# Patient Record
Sex: FEMALE | Race: WHITE | NOT HISPANIC OR LATINO | Employment: OTHER | URBAN - METROPOLITAN AREA
[De-identification: names, ages, dates, MRNs, and addresses within clinical notes are randomized per-mention and may not be internally consistent; named-entity substitution may affect disease eponyms.]

---

## 2017-01-12 ENCOUNTER — PATIENT MESSAGE (OUTPATIENT)
Dept: ADMINISTRATIVE | Facility: OTHER | Age: 62
End: 2017-01-12

## 2017-12-23 DIAGNOSIS — I10 ESSENTIAL HYPERTENSION: ICD-10-CM

## 2017-12-23 DIAGNOSIS — E03.9 ACQUIRED HYPOTHYROIDISM: ICD-10-CM

## 2017-12-27 RX ORDER — METOPROLOL SUCCINATE 25 MG/1
TABLET, EXTENDED RELEASE ORAL
Qty: 90 TABLET | Refills: 0 | Status: SHIPPED | OUTPATIENT
Start: 2017-12-27 | End: 2018-03-17 | Stop reason: SDUPTHER

## 2017-12-27 RX ORDER — LISINOPRIL AND HYDROCHLOROTHIAZIDE 12.5; 2 MG/1; MG/1
TABLET ORAL
Qty: 180 TABLET | Refills: 0 | Status: SHIPPED | OUTPATIENT
Start: 2017-12-27 | End: 2018-03-17 | Stop reason: SDUPTHER

## 2017-12-27 RX ORDER — LEVOTHYROXINE SODIUM 50 UG/1
TABLET ORAL
Qty: 90 TABLET | Refills: 0 | Status: SHIPPED | OUTPATIENT
Start: 2017-12-27 | End: 2018-03-17 | Stop reason: SDUPTHER

## 2017-12-27 NOTE — PROGRESS NOTES
Refill Authorization Note     is requesting a refill authorization.    Brief assessment and rationale for refill: APPROVE: pt needs labs and f/u  Amount/Quantity of medication ordered: 90d         Refills Authorized: Yes  If authorized number of refills: 0        Medication-related problems identified: Requires labs  Medication Therapy Plan: Will order CMP and TSH; NTBS and also f/u.  3 more mo  Name and strength of medication: levothyroxine / lisinopril-hctz / metoprolol succinate  How patient will take medication: t1t po daily / t2t po daily (lisino)  Medication reconciliation completed: No  Comments:   Lab Results   Component Value Date    CREATININE 0.8 11/22/2016    BUN 16 11/22/2016     11/22/2016    K 4.5 11/22/2016     11/22/2016    CO2 30 (H) 11/22/2016      BP Readings from Last 3 Encounters:   11/21/16 126/86   10/02/15 110/80   07/09/14 130/84

## 2017-12-30 ENCOUNTER — HOSPITAL ENCOUNTER (OUTPATIENT)
Dept: RADIOLOGY | Facility: HOSPITAL | Age: 62
Discharge: HOME | End: 2017-12-30
Attending: NURSE PRACTITIONER
Payer: COMMERCIAL

## 2017-12-30 VITALS — BODY MASS INDEX: 28.49 KG/M2 | WEIGHT: 166 LBS

## 2017-12-30 DIAGNOSIS — Z12.39 ENCOUNTER FOR SPECIAL SCREENING EXAMINATION FOR NEOPLASM OF BREAST: ICD-10-CM

## 2017-12-30 PROCEDURE — 77063 BREAST TOMOSYNTHESIS BI: CPT | Mod: 26,,, | Performed by: RADIOLOGY

## 2017-12-30 PROCEDURE — 77067 SCR MAMMO BI INCL CAD: CPT | Mod: 26,,, | Performed by: RADIOLOGY

## 2017-12-30 PROCEDURE — 77067 SCR MAMMO BI INCL CAD: CPT | Mod: TC,PO

## 2018-03-17 DIAGNOSIS — E03.9 ACQUIRED HYPOTHYROIDISM: ICD-10-CM

## 2018-03-17 DIAGNOSIS — I10 ESSENTIAL HYPERTENSION: ICD-10-CM

## 2018-03-20 RX ORDER — LEVOTHYROXINE SODIUM 50 UG/1
TABLET ORAL
Qty: 90 TABLET | Refills: 0 | Status: SHIPPED | OUTPATIENT
Start: 2018-03-20 | End: 2018-06-22 | Stop reason: SDUPTHER

## 2018-03-20 RX ORDER — METOPROLOL SUCCINATE 25 MG/1
TABLET, EXTENDED RELEASE ORAL
Qty: 90 TABLET | Refills: 0 | Status: SHIPPED | OUTPATIENT
Start: 2018-03-20 | End: 2018-06-22 | Stop reason: SDUPTHER

## 2018-03-20 RX ORDER — LISINOPRIL AND HYDROCHLOROTHIAZIDE 12.5; 2 MG/1; MG/1
TABLET ORAL
Qty: 180 TABLET | Refills: 0 | Status: SHIPPED | OUTPATIENT
Start: 2018-03-20 | End: 2018-06-22 | Stop reason: SDUPTHER

## 2018-03-20 NOTE — PROGRESS NOTES
Refill Authorization Note     is requesting a refill authorization.    Brief assessment and rationale for refill: APPROVE: needs annual  Amount/Quantity of medication ordered: 90d        Refills Authorized: Yes  If authorized number of refills: 0        Medication-related problems identified: Requires appointment  Medication Therapy Plan: Lab work WNL: BP controlled; pooja 3more toget pt to return for f/u  Name and strength of medication: LEVOTHYROXIN 50MCG TAB / metoprolol succ / lisinopril  How patient will take medication: UTD  Medication reconciliation completed: No  Comments:   BP Readings from Last 3 Encounters:   11/21/16 126/86   10/02/15 110/80   07/09/14 130/84      Lab Results   Component Value Date    TSH 3.006 12/30/2017      Lab Results   Component Value Date    CREATININE 0.8 12/30/2017    BUN 12 12/30/2017     12/30/2017    K 4.5 12/30/2017     12/30/2017    CO2 31 (H) 12/30/2017    .

## 2018-06-22 DIAGNOSIS — I10 ESSENTIAL HYPERTENSION: ICD-10-CM

## 2018-06-22 DIAGNOSIS — E03.9 ACQUIRED HYPOTHYROIDISM: ICD-10-CM

## 2018-06-22 RX ORDER — LEVOTHYROXINE SODIUM 50 UG/1
TABLET ORAL
Qty: 90 TABLET | Refills: 0 | Status: SHIPPED | OUTPATIENT
Start: 2018-06-22 | End: 2018-09-22 | Stop reason: SDUPTHER

## 2018-06-22 RX ORDER — LISINOPRIL AND HYDROCHLOROTHIAZIDE 12.5; 2 MG/1; MG/1
TABLET ORAL
Qty: 180 TABLET | Refills: 0 | Status: SHIPPED | OUTPATIENT
Start: 2018-06-22 | End: 2018-09-22 | Stop reason: SDUPTHER

## 2018-06-22 RX ORDER — METOPROLOL SUCCINATE 25 MG/1
TABLET, EXTENDED RELEASE ORAL
Qty: 90 TABLET | Refills: 0 | Status: SHIPPED | OUTPATIENT
Start: 2018-06-22 | End: 2018-09-22 | Stop reason: SDUPTHER

## 2018-09-22 DIAGNOSIS — I10 ESSENTIAL HYPERTENSION: ICD-10-CM

## 2018-09-22 DIAGNOSIS — E03.9 ACQUIRED HYPOTHYROIDISM: ICD-10-CM

## 2018-09-23 RX ORDER — LEVOTHYROXINE SODIUM 50 UG/1
TABLET ORAL
Qty: 90 TABLET | Refills: 0 | Status: SHIPPED | OUTPATIENT
Start: 2018-09-23 | End: 2018-12-27 | Stop reason: SDUPTHER

## 2018-09-23 RX ORDER — LISINOPRIL AND HYDROCHLOROTHIAZIDE 12.5; 2 MG/1; MG/1
TABLET ORAL
Qty: 180 TABLET | Refills: 0 | Status: SHIPPED | OUTPATIENT
Start: 2018-09-23 | End: 2018-12-27 | Stop reason: SDUPTHER

## 2018-09-23 RX ORDER — METOPROLOL SUCCINATE 25 MG/1
TABLET, EXTENDED RELEASE ORAL
Qty: 90 TABLET | Refills: 0 | Status: SHIPPED | OUTPATIENT
Start: 2018-09-23 | End: 2018-12-27 | Stop reason: SDUPTHER

## 2018-12-27 DIAGNOSIS — E03.9 ACQUIRED HYPOTHYROIDISM: ICD-10-CM

## 2018-12-27 DIAGNOSIS — I10 ESSENTIAL HYPERTENSION: ICD-10-CM

## 2018-12-28 RX ORDER — LISINOPRIL AND HYDROCHLOROTHIAZIDE 12.5; 2 MG/1; MG/1
TABLET ORAL
Qty: 180 TABLET | Refills: 0 | Status: SHIPPED | OUTPATIENT
Start: 2018-12-28 | End: 2019-01-03 | Stop reason: SDUPTHER

## 2018-12-28 RX ORDER — METOPROLOL SUCCINATE 25 MG/1
TABLET, EXTENDED RELEASE ORAL
Qty: 90 TABLET | Refills: 0 | Status: SHIPPED | OUTPATIENT
Start: 2018-12-28 | End: 2019-01-03 | Stop reason: SDUPTHER

## 2018-12-28 RX ORDER — LEVOTHYROXINE SODIUM 50 UG/1
TABLET ORAL
Qty: 90 TABLET | Refills: 0 | Status: SHIPPED | OUTPATIENT
Start: 2018-12-28 | End: 2019-01-03 | Stop reason: SDUPTHER

## 2018-12-28 NOTE — TELEPHONE ENCOUNTER
I have not seen this patient in 2 years. She needs to scheudle an appointment, then send me a refill request with enough medications to last until the appointment only. Schedule an Am appointment with me, extended, and ask her to come in fasting.

## 2018-12-28 NOTE — TELEPHONE ENCOUNTER
I spoke with pt she has appointment scheduled for 1:40pm to see you jan 3rd she is a  she will come in fasting or she will get labs scheduled for another time after your visit.

## 2018-12-31 ENCOUNTER — HOSPITAL ENCOUNTER (OUTPATIENT)
Dept: RADIOLOGY | Facility: HOSPITAL | Age: 63
Discharge: HOME | End: 2018-12-31
Attending: FAMILY MEDICINE
Payer: COMMERCIAL

## 2018-12-31 VITALS — WEIGHT: 166 LBS | BODY MASS INDEX: 28.34 KG/M2 | HEIGHT: 64 IN

## 2018-12-31 DIAGNOSIS — Z12.39 ENCOUNTER FOR SPECIAL SCREENING EXAMINATION FOR NEOPLASM OF BREAST: ICD-10-CM

## 2018-12-31 PROCEDURE — 77067 MAMMO DIGITAL SCREENING BILAT WITH TOMOSYNTHESIS_CAD: ICD-10-PCS | Mod: 26,,, | Performed by: RADIOLOGY

## 2018-12-31 PROCEDURE — 77067 SCR MAMMO BI INCL CAD: CPT | Mod: 26,,, | Performed by: RADIOLOGY

## 2018-12-31 PROCEDURE — 77063 MAMMO DIGITAL SCREENING BILAT WITH TOMOSYNTHESIS_CAD: ICD-10-PCS | Mod: 26,,, | Performed by: RADIOLOGY

## 2018-12-31 PROCEDURE — 77067 SCR MAMMO BI INCL CAD: CPT | Mod: TC,PO

## 2018-12-31 PROCEDURE — 77063 BREAST TOMOSYNTHESIS BI: CPT | Mod: 26,,, | Performed by: RADIOLOGY

## 2019-01-03 ENCOUNTER — OFFICE VISIT (OUTPATIENT)
Dept: FAMILY MEDICINE | Facility: CLINIC | Age: 64
End: 2019-01-03
Payer: COMMERCIAL

## 2019-01-03 ENCOUNTER — LAB VISIT (OUTPATIENT)
Dept: LAB | Facility: HOSPITAL | Age: 64
End: 2019-01-03
Attending: NURSE PRACTITIONER
Payer: COMMERCIAL

## 2019-01-03 VITALS
WEIGHT: 162.5 LBS | HEIGHT: 64 IN | DIASTOLIC BLOOD PRESSURE: 86 MMHG | OXYGEN SATURATION: 99 % | BODY MASS INDEX: 27.74 KG/M2 | SYSTOLIC BLOOD PRESSURE: 122 MMHG | HEART RATE: 60 BPM

## 2019-01-03 DIAGNOSIS — E03.9 ACQUIRED HYPOTHYROIDISM: ICD-10-CM

## 2019-01-03 DIAGNOSIS — Z12.11 COLON CANCER SCREENING: Primary | ICD-10-CM

## 2019-01-03 DIAGNOSIS — I10 ESSENTIAL HYPERTENSION: ICD-10-CM

## 2019-01-03 LAB
ALBUMIN SERPL BCP-MCNC: 3.6 G/DL
ALP SERPL-CCNC: 73 U/L
ALT SERPL W/O P-5'-P-CCNC: 16 U/L
ANION GAP SERPL CALC-SCNC: 9 MMOL/L
AST SERPL-CCNC: 25 U/L
BILIRUB SERPL-MCNC: 1.4 MG/DL
BUN SERPL-MCNC: 13 MG/DL
CALCIUM SERPL-MCNC: 9.9 MG/DL
CHLORIDE SERPL-SCNC: 102 MMOL/L
CHOLEST SERPL-MCNC: 252 MG/DL
CHOLEST/HDLC SERPL: 2.6 {RATIO}
CO2 SERPL-SCNC: 29 MMOL/L
CREAT SERPL-MCNC: 0.8 MG/DL
EST. GFR  (AFRICAN AMERICAN): >60 ML/MIN/1.73 M^2
EST. GFR  (NON AFRICAN AMERICAN): >60 ML/MIN/1.73 M^2
GLUCOSE SERPL-MCNC: 98 MG/DL
HDLC SERPL-MCNC: 96 MG/DL
HDLC SERPL: 38.1 %
LDLC SERPL CALC-MCNC: 137.6 MG/DL
NONHDLC SERPL-MCNC: 156 MG/DL
POTASSIUM SERPL-SCNC: 4.1 MMOL/L
PROT SERPL-MCNC: 6.7 G/DL
SODIUM SERPL-SCNC: 140 MMOL/L
TRIGL SERPL-MCNC: 92 MG/DL
TSH SERPL DL<=0.005 MIU/L-ACNC: 2.11 UIU/ML

## 2019-01-03 PROCEDURE — 3074F PR MOST RECENT SYSTOLIC BLOOD PRESSURE < 130 MM HG: ICD-10-PCS | Mod: CPTII,S$GLB,, | Performed by: NURSE PRACTITIONER

## 2019-01-03 PROCEDURE — 99999 PR PBB SHADOW E&M-EST. PATIENT-LVL III: ICD-10-PCS | Mod: PBBFAC,,, | Performed by: NURSE PRACTITIONER

## 2019-01-03 PROCEDURE — 3008F BODY MASS INDEX DOCD: CPT | Mod: CPTII,S$GLB,, | Performed by: NURSE PRACTITIONER

## 2019-01-03 PROCEDURE — 3008F PR BODY MASS INDEX (BMI) DOCUMENTED: ICD-10-PCS | Mod: CPTII,S$GLB,, | Performed by: NURSE PRACTITIONER

## 2019-01-03 PROCEDURE — 3079F DIAST BP 80-89 MM HG: CPT | Mod: CPTII,S$GLB,, | Performed by: NURSE PRACTITIONER

## 2019-01-03 PROCEDURE — 84443 ASSAY THYROID STIM HORMONE: CPT

## 2019-01-03 PROCEDURE — 80053 COMPREHEN METABOLIC PANEL: CPT

## 2019-01-03 PROCEDURE — 99214 PR OFFICE/OUTPT VISIT, EST, LEVL IV, 30-39 MIN: ICD-10-PCS | Mod: S$GLB,,, | Performed by: NURSE PRACTITIONER

## 2019-01-03 PROCEDURE — 36415 COLL VENOUS BLD VENIPUNCTURE: CPT | Mod: PO

## 2019-01-03 PROCEDURE — 99999 PR PBB SHADOW E&M-EST. PATIENT-LVL III: CPT | Mod: PBBFAC,,, | Performed by: NURSE PRACTITIONER

## 2019-01-03 PROCEDURE — 3074F SYST BP LT 130 MM HG: CPT | Mod: CPTII,S$GLB,, | Performed by: NURSE PRACTITIONER

## 2019-01-03 PROCEDURE — 3079F PR MOST RECENT DIASTOLIC BLOOD PRESSURE 80-89 MM HG: ICD-10-PCS | Mod: CPTII,S$GLB,, | Performed by: NURSE PRACTITIONER

## 2019-01-03 PROCEDURE — 80061 LIPID PANEL: CPT

## 2019-01-03 PROCEDURE — 99214 OFFICE O/P EST MOD 30 MIN: CPT | Mod: S$GLB,,, | Performed by: NURSE PRACTITIONER

## 2019-01-03 RX ORDER — LISINOPRIL AND HYDROCHLOROTHIAZIDE 12.5; 2 MG/1; MG/1
2 TABLET ORAL DAILY
Qty: 180 TABLET | Refills: 3 | Status: SHIPPED | OUTPATIENT
Start: 2019-01-03 | End: 2019-06-18 | Stop reason: SDUPTHER

## 2019-01-03 RX ORDER — LEVOTHYROXINE SODIUM 50 UG/1
50 TABLET ORAL DAILY
Qty: 90 TABLET | Refills: 3 | Status: SHIPPED | OUTPATIENT
Start: 2019-01-03 | End: 2019-06-18 | Stop reason: SDUPTHER

## 2019-01-03 RX ORDER — METOPROLOL SUCCINATE 25 MG/1
25 TABLET, EXTENDED RELEASE ORAL DAILY
Qty: 90 TABLET | Refills: 3 | Status: SHIPPED | OUTPATIENT
Start: 2019-01-03 | End: 2019-06-18 | Stop reason: SDUPTHER

## 2019-01-03 NOTE — PROGRESS NOTES
Subjective:       Patient ID: Bhavani Hill is a 63 y.o. female.    Chief Complaint: Annual Exam and Shoulder Pain    Patient is here for a check up. Compliant with medications for htn and hypothyroidism. Due for fasting labs. Due for colonoscopy and pap smear.   She does complain of left upper back/neck pain, constant pain, burning pain, worse in some positions. Worse when under stress/tension.     Colonoscopy due on 2004  Pap Smear with HPV Cotest due on 2018    Past Medical History:  Past Medical History:  No date: Essential (primary) hypertension      Comment:  since ~   No date: Hypothyroidism      Comment:  since   Past Surgical History:  No date:  SECTION      Comment:  x 2  No date: CHOLECYSTECTOMY      Comment:    Review of patient's allergies indicates:  No Known Allergies  Current Outpatient Medications on File Prior to Visit:  (DISCONTINUED) levothyroxine (SYNTHROID) 50 MCG tablet, TAKE 1 TABLET BY MOUTH ONCE DAILY, Disp: 90 tablet, Rfl: 0  (DISCONTINUED) lisinopril-hydrochlorothiazide (PRINZIDE,ZESTORETIC) 20-12.5 mg per tablet, TAKE 2 TABLETS BY MOUTH ONCE DAILY, Disp: 180 tablet, Rfl: 0  (DISCONTINUED) metoprolol succinate (TOPROL-XL) 25 MG 24 hr tablet, TAKE 1 TABLET BY MOUTH ONCE DAILY, Disp: 90 tablet, Rfl: 0  (DISCONTINUED) guaifenesin-codeine 100-10 mg/5 ml (TUSSI-ORGANIDIN NR)  mg/5 mL syrup, Take 5 mLs by mouth 3 (three) times daily as needed., Disp: 200 mL, Rfl: 0    No current facility-administered medications on file prior to visit.     Social History    Socioeconomic History      Marital status:       Spouse name: Not on file      Number of children: Not on file      Years of education: Not on file      Highest education level: Not on file    Social Needs      Financial resource strain: Not on file      Food insecurity - worry: Not on file      Food insecurity - inability: Not on file      Transportation needs - medical: Not on file       Transportation needs - non-medical: Not on file    Occupational History      Occupation: teacher    Tobacco Use      Smoking status: Never Smoker    Substance and Sexual Activity      Alcohol use: Yes        Comment: 1 martini daily      Drug use: Not on file      Sexual activity: Not on file    Other Topics      Concerns:        Not on file    Social History Narrative      Not on file    Review of patient's family history indicates:  Problem: Breast cancer      Relation: Mother          Age of Onset: 58  Problem: Breast cancer      Relation: Sister          Age of Onset: 35            Review of Systems   Constitutional: Negative for activity change and unexpected weight change.   HENT: Negative for hearing loss, rhinorrhea and trouble swallowing.    Eyes: Negative for discharge and visual disturbance.   Respiratory: Negative for chest tightness and wheezing.    Cardiovascular: Negative for chest pain and palpitations.   Gastrointestinal: Negative for blood in stool, constipation, diarrhea and vomiting.   Endocrine: Negative for polydipsia and polyuria.   Genitourinary: Negative for difficulty urinating, dysuria, hematuria and menstrual problem.   Musculoskeletal: Positive for back pain and neck pain. Negative for arthralgias and joint swelling.   Skin: Negative for rash.   Neurological: Negative for weakness and headaches.   Psychiatric/Behavioral: Negative for confusion and dysphoric mood. The patient is nervous/anxious.        Objective:      Physical Exam   Constitutional: She is oriented to person, place, and time. No distress.   HENT:   Head: Normocephalic and atraumatic.   Eyes: Pupils are equal, round, and reactive to light.   Neck: Normal range of motion.   Cardiovascular: Normal rate and regular rhythm. Exam reveals no friction rub.   No murmur heard.  Pulmonary/Chest: Effort normal and breath sounds normal. No stridor. No respiratory distress.   Abdominal: Soft. Bowel sounds are normal.   Musculoskeletal:  She exhibits no edema.        Arms:  Neurological: She is alert and oriented to person, place, and time.   Skin: She is not diaphoretic.   Psychiatric: She has a normal mood and affect. Her behavior is normal.   Vitals reviewed.      Assessment:       1. Colon cancer screening    2. Essential hypertension    3. Acquired hypothyroidism        Plan:     Scheduling Pap smear with Gyn, Dr Sanches this month.   She does not want to do PT or further evaluation of her back at this time do to school schedule. Will contact us if it gets worse.   1. Essential hypertension  Stable, refill current medications.   - Lipid panel; Future  - Comprehensive metabolic panel; Future  - metoprolol succinate (TOPROL-XL) 25 MG 24 hr tablet; Take 1 tablet (25 mg total) by mouth once daily.  Dispense: 90 tablet; Refill: 3  - lisinopril-hydrochlorothiazide (PRINZIDE,ZESTORETIC) 20-12.5 mg per tablet; Take 2 tablets by mouth once daily.  Dispense: 180 tablet; Refill: 3    2. Acquired hypothyroidism  TSH scheduled today, refill synthroid.  - TSH; Future  - Lipid panel; Future  - Comprehensive metabolic panel; Future  - levothyroxine (SYNTHROID) 50 MCG tablet; Take 1 tablet (50 mcg total) by mouth once daily.  Dispense: 90 tablet; Refill: 3    3. Colon cancer screening  Refuses colonoscopy currently, will do Fit test.  - Fecal Immunochemical Test (iFOBT); Future

## 2019-01-07 DIAGNOSIS — R17 SERUM TOTAL BILIRUBIN ELEVATED: Primary | ICD-10-CM

## 2019-01-10 ENCOUNTER — TELEPHONE (OUTPATIENT)
Dept: FAMILY MEDICINE | Facility: CLINIC | Age: 64
End: 2019-01-10

## 2019-01-10 NOTE — TELEPHONE ENCOUNTER
----- Message from Caridad Marie sent at 1/10/2019 10:31 AM CST -----  Type:  Patient Returning Call    Who Called: Patient  Who Left Message for Patient:  Natividad  Does the patient know what this is regarding?:  yes  Best Call Back Number:  [phone] (home)     Additional Information:  Na

## 2019-04-01 ENCOUNTER — LAB VISIT (OUTPATIENT)
Dept: LAB | Facility: HOSPITAL | Age: 64
End: 2019-04-01
Attending: NURSE PRACTITIONER
Payer: COMMERCIAL

## 2019-04-01 DIAGNOSIS — R17 SERUM TOTAL BILIRUBIN ELEVATED: ICD-10-CM

## 2019-04-01 LAB
ALBUMIN SERPL BCP-MCNC: 3.4 G/DL (ref 3.5–5.2)
ALP SERPL-CCNC: 69 U/L (ref 55–135)
ALT SERPL W/O P-5'-P-CCNC: 16 U/L (ref 10–44)
AST SERPL-CCNC: 25 U/L (ref 10–40)
BILIRUB DIRECT SERPL-MCNC: 0.4 MG/DL (ref 0.1–0.3)
BILIRUB SERPL-MCNC: 1.1 MG/DL (ref 0.1–1)
PROT SERPL-MCNC: 6.2 G/DL (ref 6–8.4)

## 2019-04-01 PROCEDURE — 36415 COLL VENOUS BLD VENIPUNCTURE: CPT | Mod: PO

## 2019-04-01 PROCEDURE — 80076 HEPATIC FUNCTION PANEL: CPT

## 2019-04-02 ENCOUNTER — TELEPHONE (OUTPATIENT)
Dept: FAMILY MEDICINE | Facility: CLINIC | Age: 64
End: 2019-04-02

## 2019-04-02 NOTE — TELEPHONE ENCOUNTER
----- Message from Dm Guzman sent at 4/2/2019  2:22 PM CDT -----  Contact: same  Type:  Test Results    Who Called:  patient  Name of Test (Lab/Mammo/Etc):  labs  Date of Test:  4/1/19  Ordering Provider:  Renu  Where the test was performed:  [address]  Best Call Back Number:  [phone]  Additional Information:  n/a

## 2019-05-18 ENCOUNTER — OFFICE VISIT (OUTPATIENT)
Dept: URGENT CARE | Facility: CLINIC | Age: 64
End: 2019-05-18
Payer: COMMERCIAL

## 2019-05-18 VITALS
DIASTOLIC BLOOD PRESSURE: 78 MMHG | HEART RATE: 68 BPM | OXYGEN SATURATION: 97 % | TEMPERATURE: 98 F | SYSTOLIC BLOOD PRESSURE: 126 MMHG | RESPIRATION RATE: 14 BRPM

## 2019-05-18 DIAGNOSIS — R09.82 PND (POST-NASAL DRIP): ICD-10-CM

## 2019-05-18 DIAGNOSIS — R09.81 SINUS CONGESTION: Primary | ICD-10-CM

## 2019-05-18 PROCEDURE — 3078F DIAST BP <80 MM HG: CPT | Mod: CPTII,S$GLB,, | Performed by: PHYSICIAN ASSISTANT

## 2019-05-18 PROCEDURE — 3074F SYST BP LT 130 MM HG: CPT | Mod: CPTII,S$GLB,, | Performed by: PHYSICIAN ASSISTANT

## 2019-05-18 PROCEDURE — 99214 PR OFFICE/OUTPT VISIT, EST, LEVL IV, 30-39 MIN: ICD-10-PCS | Mod: S$GLB,,, | Performed by: PHYSICIAN ASSISTANT

## 2019-05-18 PROCEDURE — 3078F PR MOST RECENT DIASTOLIC BLOOD PRESSURE < 80 MM HG: ICD-10-PCS | Mod: CPTII,S$GLB,, | Performed by: PHYSICIAN ASSISTANT

## 2019-05-18 PROCEDURE — 3074F PR MOST RECENT SYSTOLIC BLOOD PRESSURE < 130 MM HG: ICD-10-PCS | Mod: CPTII,S$GLB,, | Performed by: PHYSICIAN ASSISTANT

## 2019-05-18 PROCEDURE — 99214 OFFICE O/P EST MOD 30 MIN: CPT | Mod: S$GLB,,, | Performed by: PHYSICIAN ASSISTANT

## 2019-05-18 RX ORDER — BENZONATATE 200 MG/1
200 CAPSULE ORAL 3 TIMES DAILY PRN
Qty: 60 CAPSULE | Refills: 1 | Status: SHIPPED | OUTPATIENT
Start: 2019-05-18 | End: 2019-05-28

## 2019-05-18 RX ORDER — PREDNISONE 10 MG/1
TABLET ORAL
Qty: 18 TABLET | Refills: 0 | Status: SHIPPED | OUTPATIENT
Start: 2019-05-18 | End: 2019-07-05

## 2019-05-18 RX ORDER — FLUTICASONE PROPIONATE 50 MCG
1 SPRAY, SUSPENSION (ML) NASAL 2 TIMES DAILY PRN
Qty: 1 BOTTLE | Refills: 1 | Status: SHIPPED | OUTPATIENT
Start: 2019-05-18 | End: 2021-03-19

## 2019-05-18 NOTE — PROGRESS NOTES
Subjective:       Patient ID: Bhavani Hill is a 63 y.o. female.    Vitals:  oral temperature is 97.8 °F (36.6 °C). Her blood pressure is 126/78 and her pulse is 68. Her respiration is 14 and oxygen saturation is 97%.     Chief Complaint: Nasal Congestion    Pt c/o nasal congestion, 6 days, post nasal drip, sinus pressure, productive cough with yellow tinged sputum, sore throat, taking xyzal with no relief     URI    This is a new problem. The current episode started in the past 7 days. The problem has been unchanged. Associated symptoms include congestion, coughing, sinus pain and a sore throat. Pertinent negatives include no ear pain, nausea, rash, vomiting or wheezing. Treatments tried: xyzal  The treatment provided no relief.       Constitution: Negative for chills, sweating, fatigue and fever.   HENT: Positive for congestion, sinus pain, sinus pressure and sore throat. Negative for ear pain and voice change.    Neck: Negative for painful lymph nodes.   Eyes: Negative for eye redness.   Respiratory: Positive for cough and sputum production. Negative for chest tightness, bloody sputum, COPD, shortness of breath, stridor, wheezing and asthma.    Gastrointestinal: Negative for nausea and vomiting.   Musculoskeletal: Negative for muscle ache.   Skin: Negative for rash.   Allergic/Immunologic: Negative for seasonal allergies and asthma.   Hematologic/Lymphatic: Negative for swollen lymph nodes.       Objective:      Physical Exam   Constitutional: She is oriented to person, place, and time. She appears well-developed and well-nourished. She is cooperative.  Non-toxic appearance. She does not appear ill. No distress.   HENT:   Head: Normocephalic and atraumatic.   Right Ear: Hearing, external ear and ear canal normal. Tympanic membrane is bulging. Tympanic membrane is not injected and not erythematous.   Left Ear: Hearing, external ear and ear canal normal. Tympanic membrane is bulging. Tympanic membrane is not injected  and not erythematous.   Nose: Nose normal. No mucosal edema, rhinorrhea or nasal deformity. No epistaxis. Right sinus exhibits no maxillary sinus tenderness and no frontal sinus tenderness. Left sinus exhibits no maxillary sinus tenderness and no frontal sinus tenderness.   Mouth/Throat: Uvula is midline, oropharynx is clear and moist and mucous membranes are normal. No trismus in the jaw. Normal dentition. No uvula swelling. No oropharyngeal exudate, posterior oropharyngeal edema or posterior oropharyngeal erythema. No tonsillar exudate.   Eyes: Conjunctivae and lids are normal. No scleral icterus.   Sclera clear bilat   Neck: Trachea normal, full passive range of motion without pain and phonation normal. Neck supple.   Cardiovascular: Normal rate, regular rhythm, normal heart sounds, intact distal pulses and normal pulses.   Pulmonary/Chest: Effort normal and breath sounds normal. No respiratory distress. She has no decreased breath sounds. She has no wheezes.   Abdominal: Soft. Normal appearance and bowel sounds are normal. She exhibits no distension. There is no tenderness.   Musculoskeletal: Normal range of motion. She exhibits no edema or deformity.   Neurological: She is alert and oriented to person, place, and time. She exhibits normal muscle tone. Coordination normal.   Skin: Skin is warm, dry and intact. She is not diaphoretic. No pallor.   Psychiatric: She has a normal mood and affect. Her speech is normal and behavior is normal. Judgment and thought content normal. Cognition and memory are normal.   Nursing note and vitals reviewed.      Assessment:       1. Sinus congestion    2. PND (post-nasal drip)        Plan:         Sinus congestion  -     predniSONE (DELTASONE) 10 MG tablet; Take two pills po x 5 days, 1 pill po x 5 days, 1/2 pill po until empty. Start 24 hours after injection.  Dispense: 18 tablet; Refill: 0  -     benzonatate (TESSALON) 200 MG capsule; Take 1 capsule (200 mg total) by mouth 3  "(three) times daily as needed for Cough.  Dispense: 60 capsule; Refill: 1  -     fluticasone propionate (FLONASE) 50 mcg/actuation nasal spray; 1 spray (50 mcg total) by Each Nare route 2 (two) times daily as needed for Rhinitis or Allergies.  Dispense: 1 Bottle; Refill: 1  -     sodium chloride (OCEAN NASAL) 0.65 % nasal spray; 1 spray by Nasal route as needed for Congestion.  Dispense: 45 mL; Refill: 3    PND (post-nasal drip)  -     predniSONE (DELTASONE) 10 MG tablet; Take two pills po x 5 days, 1 pill po x 5 days, 1/2 pill po until empty. Start 24 hours after injection.  Dispense: 18 tablet; Refill: 0  -     benzonatate (TESSALON) 200 MG capsule; Take 1 capsule (200 mg total) by mouth 3 (three) times daily as needed for Cough.  Dispense: 60 capsule; Refill: 1  -     fluticasone propionate (FLONASE) 50 mcg/actuation nasal spray; 1 spray (50 mcg total) by Each Nare route 2 (two) times daily as needed for Rhinitis or Allergies.  Dispense: 1 Bottle; Refill: 1  -     sodium chloride (OCEAN NASAL) 0.65 % nasal spray; 1 spray by Nasal route as needed for Congestion.  Dispense: 45 mL; Refill: 3      Patient Instructions   NASAL ALLERGY    Nasal Allergy, also called "Allergic Rhinitis" occurs after exposure to pollen, molds, mildew, animal "dander" (scales from animal skin, hair and feathers), dust, smoke and fumes. (These are called "allergens"). When pollen causes a nasal allergy it is commonly called "Hay Fever".    When these particles contact the lining of the nose, eyes, eyelids, sinuses or throat, they cause the cells to release a chemical called "histamine". Histamine may cause a watery discharge from the eyes or nose. It may also cause violent sneezing, nasal congestion, itching of the eyes, nose, throat and mouth.    PREVENTION:    Nasal allergy cannot be cured but symptoms can be reduced. Avoid or reduce exposure to the allergen when possible.    HOME CARE:    1) DECONGESTANT pills and sprays (Sudafed, " NeoSynephrine), reduce tissue swelling and watery discharge. Overuse of nasal decongestant sprays may make symptoms worse, ESPECIALLY IF YOU HAVE HIGH BLOOD PRESSURE. Do not use these more often than recommended. Get an over the counter Nasal Saline spray to supplement Flonase    2) ANTIHISTAMINES block the release of histamine during the allergic response. Antihistamines are more effective when taken BEFORE symptoms develop. Unless a prescription antihistamine was prescribed, you may take CLARITIN (loratadine). (Claritin is an over-the-counter antihistamine that does not cause drowsiness.)    3) STEROID nasal sprays (Beconase, Vancenase, Nasalide, Nasocort, Flonase) or oral steroids (Prednisone) may also be prescribed for more severe symptoms. These help to reduce the local inflammation which adds to the allergic response.    4) If you have ASTHMA, pollen season may make your asthma symptoms worse. It is important that you use your asthma medicines as directed during this time to prevent or treat attacks. Some persons with asthma have a worsening of their asthma symptoms when taking antihistamines. If you notice this, stop the antihistamines and notify your doctor.    5) Consider a Chitto Rhino Nasal and Sinus Rinse 2-3 times/week if your symptoms are chronic. (https://chitorhino.com)       If not allergic,take tylenol (acetominophen) for fever control, chills, or body aches every 4 hours. Do not exceed 4000 mg/ day.If not allergic, take Motrin (Ibuprofen) every 4 hours for fever, chills, pain or inflammation. Do not exceed 2400 mg/day. You can alternate taking tylenol and motrin.  If you were prescribed a narcotic medication, do not drive or operate heavy equipment or machinery while taking these medications.  You must understand that you've received an Urgent Care treatment only and that you may be released before all your medical problems are known or treated. You, the patient, will arrange for follow up care as  instructed.  Follow up with your PCP or specialty clinic as directed in the next 1-2 weeks if not improved or as needed.  You can call [phone] to schedule an appointment with the appropriate provider.  If your condition worsens we recommend that you receive another evaluation at the emergency room immediately or contact your primary medical clinics after hours call service to discuss your concerns.  Please return here or go to the Emergency Department for any concerns or worsening of condition.

## 2019-05-18 NOTE — PATIENT INSTRUCTIONS
"NASAL ALLERGY    Nasal Allergy, also called "Allergic Rhinitis" occurs after exposure to pollen, molds, mildew, animal "dander" (scales from animal skin, hair and feathers), dust, smoke and fumes. (These are called "allergens"). When pollen causes a nasal allergy it is commonly called "Hay Fever".    When these particles contact the lining of the nose, eyes, eyelids, sinuses or throat, they cause the cells to release a chemical called "histamine". Histamine may cause a watery discharge from the eyes or nose. It may also cause violent sneezing, nasal congestion, itching of the eyes, nose, throat and mouth.    PREVENTION:    Nasal allergy cannot be cured but symptoms can be reduced. Avoid or reduce exposure to the allergen when possible.    HOME CARE:    1) DECONGESTANT pills and sprays (Sudafed, NeoSynephrine), reduce tissue swelling and watery discharge. Overuse of nasal decongestant sprays may make symptoms worse, ESPECIALLY IF YOU HAVE HIGH BLOOD PRESSURE. Do not use these more often than recommended. Get an over the counter Nasal Saline spray to supplement Flonase    2) ANTIHISTAMINES block the release of histamine during the allergic response. Antihistamines are more effective when taken BEFORE symptoms develop. Unless a prescription antihistamine was prescribed, you may take CLARITIN (loratadine). (Claritin is an over-the-counter antihistamine that does not cause drowsiness.)    3) STEROID nasal sprays (Beconase, Vancenase, Nasalide, Nasocort, Flonase) or oral steroids (Prednisone) may also be prescribed for more severe symptoms. These help to reduce the local inflammation which adds to the allergic response.    4) If you have ASTHMA, pollen season may make your asthma symptoms worse. It is important that you use your asthma medicines as directed during this time to prevent or treat attacks. Some persons with asthma have a worsening of their asthma symptoms when taking antihistamines. If you notice this, stop " the antihistamines and notify your doctor.    5) Consider a Ch[place] Rhino Nasal and Sinus Rinse 2-3 times/week if your symptoms are chronic. (https://chitorhino.[email or web address])       If not allergic,take tylenol (acetominophen) for fever control, chills, or body aches every 4 hours. Do not exceed 4000 mg/ day.If not allergic, take Motrin (Ibuprofen) every 4 hours for fever, chills, pain or inflammation. Do not exceed 2400 mg/day. You can alternate taking tylenol and motrin.  If you were prescribed a narcotic medication, do not drive or operate heavy equipment or machinery while taking these medications.  You must understand that you've received an Urgent Care treatment only and that you may be released before all your medical problems are known or treated. You, the patient, will arrange for follow up care as instructed.  Follow up with your PCP or specialty clinic as directed in the next 1-2 weeks if not improved or as needed.  You can call [phone] to schedule an appointment with the appropriate provider.  If your condition worsens we recommend that you receive another evaluation at the emergency room immediately or contact your primary medical clinics after hours call service to discuss your concerns.  Please return here or go to the Emergency Department for any concerns or worsening of condition.

## 2019-06-18 DIAGNOSIS — I10 ESSENTIAL HYPERTENSION: ICD-10-CM

## 2019-06-18 DIAGNOSIS — E03.9 ACQUIRED HYPOTHYROIDISM: ICD-10-CM

## 2019-06-18 RX ORDER — LEVOTHYROXINE SODIUM 50 UG/1
50 TABLET ORAL DAILY
Qty: 90 TABLET | Refills: 3 | Status: SHIPPED | OUTPATIENT
Start: 2019-06-18 | End: 2020-05-21

## 2019-06-18 RX ORDER — LISINOPRIL AND HYDROCHLOROTHIAZIDE 12.5; 2 MG/1; MG/1
2 TABLET ORAL DAILY
Qty: 180 TABLET | Refills: 3 | Status: SHIPPED | OUTPATIENT
Start: 2019-06-18 | End: 2020-05-21

## 2019-06-18 RX ORDER — METOPROLOL SUCCINATE 25 MG/1
25 TABLET, EXTENDED RELEASE ORAL DAILY
Qty: 90 TABLET | Refills: 3 | Status: SHIPPED | OUTPATIENT
Start: 2019-06-18 | End: 2020-05-21

## 2019-06-18 NOTE — TELEPHONE ENCOUNTER
----- Message from Kait Camacho sent at 6/18/2019 11:50 AM CDT -----  Contact: self   Patient need a refill on lisinopril-hydrochlorothiazide, levothyroxine, metoprolol succinate please send to [facility], any questions please call back at [phone] (home)       [facility] inside of [employer]  Phone: [phone] Fax: [phone]

## 2019-07-05 ENCOUNTER — OFFICE VISIT (OUTPATIENT)
Dept: URGENT CARE | Facility: CLINIC | Age: 64
End: 2019-07-05
Payer: COMMERCIAL

## 2019-07-05 VITALS
SYSTOLIC BLOOD PRESSURE: 134 MMHG | DIASTOLIC BLOOD PRESSURE: 84 MMHG | TEMPERATURE: 99 F | HEART RATE: 62 BPM | WEIGHT: 163 LBS | BODY MASS INDEX: 27.83 KG/M2 | HEIGHT: 64 IN | OXYGEN SATURATION: 100 %

## 2019-07-05 DIAGNOSIS — M62.838 CERVICAL PARASPINAL MUSCLE SPASM: ICD-10-CM

## 2019-07-05 DIAGNOSIS — R09.81 SINUS CONGESTION: Primary | ICD-10-CM

## 2019-07-05 PROCEDURE — 3008F PR BODY MASS INDEX (BMI) DOCUMENTED: ICD-10-PCS | Mod: CPTII,S$GLB,, | Performed by: PHYSICIAN ASSISTANT

## 2019-07-05 PROCEDURE — 3075F PR MOST RECENT SYSTOLIC BLOOD PRESS GE 130-139MM HG: ICD-10-PCS | Mod: CPTII,S$GLB,, | Performed by: PHYSICIAN ASSISTANT

## 2019-07-05 PROCEDURE — 1101F PT FALLS ASSESS-DOCD LE1/YR: CPT | Mod: CPTII,S$GLB,, | Performed by: PHYSICIAN ASSISTANT

## 2019-07-05 PROCEDURE — 99214 PR OFFICE/OUTPT VISIT, EST, LEVL IV, 30-39 MIN: ICD-10-PCS | Mod: 25,S$GLB,, | Performed by: PHYSICIAN ASSISTANT

## 2019-07-05 PROCEDURE — 3075F SYST BP GE 130 - 139MM HG: CPT | Mod: CPTII,S$GLB,, | Performed by: PHYSICIAN ASSISTANT

## 2019-07-05 PROCEDURE — 1101F PR PT FALLS ASSESS DOC 0-1 FALLS W/OUT INJ PAST YR: ICD-10-PCS | Mod: CPTII,S$GLB,, | Performed by: PHYSICIAN ASSISTANT

## 2019-07-05 PROCEDURE — 3079F PR MOST RECENT DIASTOLIC BLOOD PRESSURE 80-89 MM HG: ICD-10-PCS | Mod: CPTII,S$GLB,, | Performed by: PHYSICIAN ASSISTANT

## 2019-07-05 PROCEDURE — 3079F DIAST BP 80-89 MM HG: CPT | Mod: CPTII,S$GLB,, | Performed by: PHYSICIAN ASSISTANT

## 2019-07-05 PROCEDURE — 96372 PR INJECTION,THERAP/PROPH/DIAG2ST, IM OR SUBCUT: ICD-10-PCS | Mod: S$GLB,,, | Performed by: FAMILY MEDICINE

## 2019-07-05 PROCEDURE — 96372 THER/PROPH/DIAG INJ SC/IM: CPT | Mod: S$GLB,,, | Performed by: FAMILY MEDICINE

## 2019-07-05 PROCEDURE — 99214 OFFICE O/P EST MOD 30 MIN: CPT | Mod: 25,S$GLB,, | Performed by: PHYSICIAN ASSISTANT

## 2019-07-05 PROCEDURE — 3008F BODY MASS INDEX DOCD: CPT | Mod: CPTII,S$GLB,, | Performed by: PHYSICIAN ASSISTANT

## 2019-07-05 RX ORDER — CYCLOBENZAPRINE HCL 10 MG
10 TABLET ORAL 3 TIMES DAILY PRN
Qty: 30 TABLET | Refills: 0 | Status: SHIPPED | OUTPATIENT
Start: 2019-07-05 | End: 2019-07-15

## 2019-07-05 RX ORDER — BETAMETHASONE SODIUM PHOSPHATE AND BETAMETHASONE ACETATE 3; 3 MG/ML; MG/ML
6 INJECTION, SUSPENSION INTRA-ARTICULAR; INTRALESIONAL; INTRAMUSCULAR; SOFT TISSUE
Status: COMPLETED | OUTPATIENT
Start: 2019-07-05 | End: 2019-07-05

## 2019-07-05 RX ORDER — MELOXICAM 15 MG/1
15 TABLET ORAL DAILY
Qty: 30 TABLET | Refills: 0 | Status: SHIPPED | OUTPATIENT
Start: 2019-07-05 | End: 2021-03-19

## 2019-07-05 RX ADMIN — BETAMETHASONE SODIUM PHOSPHATE AND BETAMETHASONE ACETATE 6 MG: 3; 3 INJECTION, SUSPENSION INTRA-ARTICULAR; INTRALESIONAL; INTRAMUSCULAR; SOFT TISSUE at 03:07

## 2019-07-05 NOTE — PATIENT INSTRUCTIONS
General Neck and Back Pain    Both neck and back pain are usually caused by injury to the muscles or ligaments of the spine. Sometimes the disks that separate each bone of the spine may cause pain by pressing on a nearby nerve. Back and neck pain may appear after a sudden twisting or bending force (such as in a car accident), or sometimes after a simple awkward movement. In either case, muscle spasm is often present and adds to the pain.  Acute neck and back pain usually gets better in 1 to 2 weeks. Pain related to disk disease, arthritis in the spinal joints or spinal stenosis (narrowing of the spinal canal) can become chronic and last for months or years.  Back and neck pain are common problems. Most people feel better in 1 or 2 weeks, and most of the rest in 1 to 2 months. Most people can remain active.  People experience and describe pain differently.  · Pain can be sharp, stabbing, shooting, aching, cramping, or burning  · Movement, standing, bending, lifting, sitting, or walking may worsen the pain  · Pain can be localized to one spot or area, or it can be more generalized  · Pain can spread or radiate upwards, downwards, to the front, or go down your arms  · Muscle spasm may occur.  Most of the time mechanical problems with the muscles or spine cause the pain. it is usually caused by an injury, whether known or not, to the muscles or ligaments. While illnesses can cause back pain, it is usually not caused by a serious illness. Pain is usually related to physical activity, whether sports, exercise, work, or normal activity. Sometimes it can occur without an identifiable cause. This can happen simply by stretching or moving wrong, without noting pain at the time. Other causes include:  · Overexertion, lifting, pushing, pulling incorrectly or too aggressively.  · Sudden twisting, bending or stretching from an accident (car or fall), or accidental movement.  · Poor posture  · Poor conditioning, lack of regular  exercise  · Spinal disc disease or arthritis  · Stress  · Pregnancy, or illness like appendicitis, bladder or kidney infection, pelvic infections   Home care  · For neck pain: Use a comfortable pillow that supports the head and keeps the spine in a neutral position. The position of the head should not be tilted forward or backward.  · When in bed, try to find a position of comfort. A firm mattress is best. Try lying flat on your back with pillows under your knees. You can also try lying on your side with your knees bent up towards your chest and a pillow between your knees.  · At first, do not try to stretch out the sore spots. If there is a strain, it is not like the good soreness you get after exercising without an injury. In this case, stretching may make it worse.  · Avoid prolonged sitting, long car rides or travel. This puts more stress on the lower back than standing or walking.  · During the first 24 to 72 hours after an injury, apply an ice pack to the painful area for 20 minutes and then remove it for 20 minutes over a period of 60 to 90 minutes or several times a day.   · You can alternate ice and heat therapies. Talk with your healthcare provider about the best treatment for your back or neck pain. As a safety precaution, do not use a heating pad at bedtime. Sleeping with a heating pad can lead to skin burns or tissue damage.  · Therapeutic massage can help relax the back and neck muscles without stretching them.  · Be aware of safe lifting methods and do not lift anything over 15 pounds until all the pain is gone.  Medications  Talk to your healthcare provider before using medicine, especially if you have other medical problems or are taking other medicines.  · You may use over-the-counter medicine to control pain, unless another pain medicine was prescribed. If you have chronic conditions like diabetes, liver or kidney disease, stomach ulcers,  gastrointestinal bleeding, or are taking blood thinner  "medicines.  · Be careful if you are given pain medicines, narcotics, or medicine for muscle spasm. They can cause drowsiness, and can affect your coordination, reflexes, and judgment. Do not drive or operate heavy machinery.  Follow-up care  Follow up with your healthcare provider, or as advised. Physical therapy or further tests may be needed.  If X-rays were taken, you will be notified of any new findings that may affect your care.  Call 911  Seek emergency medical care if any of the following occur:  · Trouble breathing  · Confusion  · Very drowsy or trouble awakening  · Fainting or loss of consciousness  · Rapid or very slow heart rate  · Loss of bowel or bladder control  When to seek medical advice  Call your healthcare provider right away if any of these occur:  · Pain becomes worse or spreads into your arms or legs  · Weakness, numbness or pain in one or both arms or legs  · Numbness in the groin area  · Difficulty walking  · Fever of 100.4ºF (38ºC) or higher, or as directed by your healthcare provider  Date Last Reviewed: 7/1/2016 © 2000-2017 [employer]. [address] [place] [address]. All rights reserved. This information is not intended as a substitute for professional medical care. Always follow your healthcare professional's instructions.    NASAL ALLERGY    Nasal Allergy, also called "Allergic Rhinitis" occurs after exposure to pollen, molds, mildew, animal "dander" (scales from animal skin, hair and feathers), dust, smoke and fumes. (These are called "allergens"). When pollen causes a nasal allergy it is commonly called "Hay Fever".    When these particles contact the lining of the nose, eyes, eyelids, sinuses or throat, they cause the cells to release a chemical called "histamine". Histamine may cause a watery discharge from the eyes or nose. It may also cause violent sneezing, nasal congestion, itching of the eyes, nose, throat and mouth.    PREVENTION:    Nasal allergy " cannot be cured but symptoms can be reduced. Avoid or reduce exposure to the allergen when possible.    HOME CARE:    1) DECONGESTANT pills and sprays (Sudafed, NeoSynephrine), reduce tissue swelling and watery discharge. Overuse of nasal decongestant sprays may make symptoms worse, ESPECIALLY IF YOU HAVE HIGH BLOOD PRESSURE. Do not use these more often than recommended. Get an over the counter Nasal Saline spray to supplement Flonase    2) ANTIHISTAMINES block the release of histamine during the allergic response. Antihistamines are more effective when taken BEFORE symptoms develop. Unless a prescription antihistamine was prescribed, you may take CLARITIN (loratadine). (Claritin is an over-the-counter antihistamine that does not cause drowsiness.) You can also consider Xyzal, Allegra, Zyrtec.    3) STEROID nasal sprays (Beconase, Vancenase, Nasalide, Nasocort, Flonase) or oral steroids (Prednisone) may also be prescribed for more severe symptoms. These help to reduce the local inflammation which adds to the allergic response.    4) If you have ASTHMA, pollen season may make your asthma symptoms worse. It is important that you use your asthma medicines as directed during this time to prevent or treat attacks. Some persons with asthma have a worsening of their asthma symptoms when taking antihistamines. If you notice this, stop the antihistamines and notify your doctor.    5) Consider a Chitto Rhino Nasal and Sinus Rinse 2-3 times/week if your symptoms are chronic. (https://[employer])       If not allergic,take tylenol (acetominophen) for fever control, chills, or body aches every 4 hours. Do not exceed 4000 mg/ day.If not allergic, take Motrin (Ibuprofen) every 4 hours for fever, chills, pain or inflammation. Do not exceed 2400 mg/day. You can alternate taking tylenol and motrin.  If you were prescribed a narcotic medication, do not drive or operate heavy equipment or machinery while taking these  medications.  You must understand that you've received an Urgent Care treatment only and that you may be released before all your medical problems are known or treated. You, the patient, will arrange for follow up care as instructed.  Follow up with your PCP or specialty clinic as directed in the next 1-2 weeks if not improved or as needed.  You can call [phone] to schedule an appointment with the appropriate provider.  If your condition worsens we recommend that you receive another evaluation at the emergency room immediately or contact your primary medical clinics after hours call service to discuss your concerns.  Please return here or go to the Emergency Department for any concerns or worsening of condition.

## 2019-07-05 NOTE — PROGRESS NOTES
"Subjective:       Patient ID: Bhavani Hill is a 64 y.o. female.    Vitals:  height is 5' 4" (1.626 m) and weight is 73.9 kg (163 lb). Her oral temperature is 98.7 °F (37.1 °C). Her blood pressure is 134/84 and her pulse is 62. Her oxygen saturation is 100%.     Chief Complaint: Otalgia    Both ears, right is worse, + FB sensation.  More painful when laying down. Does not want prednisone, prefers celestone IM.     Otalgia    There is pain in both ears. This is a new problem. The current episode started in the past 7 days. The problem occurs every few minutes. The problem has been gradually worsening. The pain is moderate. Associated symptoms include hearing loss and a rash. Pertinent negatives include no coughing, ear discharge (feels fluid), sore throat or vomiting. She has tried NSAIDs for the symptoms. The treatment provided no relief.       Constitution: Negative for chills, sweating, fatigue and fever.   HENT: Positive for ear pain and hearing loss. Negative for ear discharge (feels fluid), dental problem (jaw pain), facial swelling, congestion, sinus pain, sinus pressure, sore throat and voice change.    Neck: Positive for painful lymph nodes.   Eyes: Negative for eye itching, eye redness and eyelid swelling.   Respiratory: Negative for chest tightness, cough, sputum production, bloody sputum, COPD, shortness of breath, stridor, wheezing and asthma.    Gastrointestinal: Negative for nausea and vomiting.   Musculoskeletal: Negative for joint pain, joint swelling and muscle ache.   Skin: Positive for rash. Negative for color change, pale, wound, abrasion, laceration, lesion, skin thickening/induration, puncture wound, erythema, bruising, abscess, avulsion and hives.   Allergic/Immunologic: Negative for environmental allergies, seasonal allergies, asthma, immunocompromised state, hives and sneezing.   Hematologic/Lymphatic: Positive for swollen lymph nodes.       Objective:      Physical Exam   Constitutional: She " is oriented to person, place, and time. She appears well-developed and well-nourished. She is cooperative.  Non-toxic appearance. She does not appear ill. No distress.   HENT:   Head: Normocephalic and atraumatic.   Right Ear: Hearing, external ear and ear canal normal. No foreign bodies. Tympanic membrane is bulging. Tympanic membrane is not injected and not erythematous. No middle ear effusion.   Left Ear: Hearing, external ear and ear canal normal. No foreign bodies. Tympanic membrane is bulging (clear congestion bilaterally). Tympanic membrane is not injected and not erythematous.  No middle ear effusion.   Nose: Nose normal. No mucosal edema, rhinorrhea or nasal deformity. No epistaxis. Right sinus exhibits no maxillary sinus tenderness and no frontal sinus tenderness. Left sinus exhibits no maxillary sinus tenderness and no frontal sinus tenderness.   Mouth/Throat: Uvula is midline, oropharynx is clear and moist and mucous membranes are normal. No trismus in the jaw. Normal dentition. No uvula swelling. No posterior oropharyngeal erythema.   Eyes: Conjunctivae and lids are normal. Right eye exhibits no discharge. Left eye exhibits no discharge. No scleral icterus.   Sclera clear bilat   Neck: Trachea normal, normal range of motion, full passive range of motion without pain and phonation normal. Neck supple.   Cardiovascular: Normal rate, regular rhythm, normal heart sounds, intact distal pulses and normal pulses.   Pulmonary/Chest: Effort normal and breath sounds normal. No respiratory distress.   Abdominal: Soft. Normal appearance and bowel sounds are normal. She exhibits no distension, no pulsatile midline mass and no mass. There is no tenderness.   Musculoskeletal: She exhibits no edema or deformity.        Cervical back: She exhibits decreased range of motion, tenderness, pain and spasm. She exhibits no bony tenderness, no swelling, no edema, no deformity and no laceration.        Back:    Neurological:  "She is alert and oriented to person, place, and time. She exhibits normal muscle tone. Coordination normal.   Skin: Skin is warm, dry and intact. She is not diaphoretic. No erythema. No pallor.   Psychiatric: She has a normal mood and affect. Her speech is normal and behavior is normal. Judgment and thought content normal. Cognition and memory are normal.   Nursing note and vitals reviewed.      Assessment:       1. Sinus congestion    2. Cervical paraspinal muscle spasm        Plan:         Sinus congestion  -     betamethasone acetate-betamethasone sodium phosphate injection 6 mg    Cervical paraspinal muscle spasm  -     meloxicam (MOBIC) 15 MG tablet; Take 1 tablet (15 mg total) by mouth once daily. Take with food  Dispense: 30 tablet; Refill: 0  -     cyclobenzaprine (FLEXERIL) 10 MG tablet; Take 1 tablet (10 mg total) by mouth 3 (three) times daily as needed for Muscle spasms.  Dispense: 30 tablet; Refill: 0    we discussed at length her treatment options and plan of care. I suggest she see a massage therapist or PT for dry needling; I consider her muscle aches to be releated to posture, stress and unrelated to the fluid in her ear. Will provide celestone IM today given historic treatment failure with PO steroids "I just feel better with the shot than pills".     Patient Instructions     General Neck and Back Pain    Both neck and back pain are usually caused by injury to the muscles or ligaments of the spine. Sometimes the disks that separate each bone of the spine may cause pain by pressing on a nearby nerve. Back and neck pain may appear after a sudden twisting or bending force (such as in a car accident), or sometimes after a simple awkward movement. In either case, muscle spasm is often present and adds to the pain.  Acute neck and back pain usually gets better in 1 to 2 weeks. Pain related to disk disease, arthritis in the spinal joints or spinal stenosis (narrowing of the spinal canal) can become " chronic and last for months or years.  Back and neck pain are common problems. Most people feel better in 1 or 2 weeks, and most of the rest in 1 to 2 months. Most people can remain active.  People experience and describe pain differently.  · Pain can be sharp, stabbing, shooting, aching, cramping, or burning  · Movement, standing, bending, lifting, sitting, or walking may worsen the pain  · Pain can be localized to one spot or area, or it can be more generalized  · Pain can spread or radiate upwards, downwards, to the front, or go down your arms  · Muscle spasm may occur.  Most of the time mechanical problems with the muscles or spine cause the pain. it is usually caused by an injury, whether known or not, to the muscles or ligaments. While illnesses can cause back pain, it is usually not caused by a serious illness. Pain is usually related to physical activity, whether sports, exercise, work, or normal activity. Sometimes it can occur without an identifiable cause. This can happen simply by stretching or moving wrong, without noting pain at the time. Other causes include:  · Overexertion, lifting, pushing, pulling incorrectly or too aggressively.  · Sudden twisting, bending or stretching from an accident (car or fall), or accidental movement.  · Poor posture  · Poor conditioning, lack of regular exercise  · Spinal disc disease or arthritis  · Stress  · Pregnancy, or illness like appendicitis, bladder or kidney infection, pelvic infections   Home care  · For neck pain: Use a comfortable pillow that supports the head and keeps the spine in a neutral position. The position of the head should not be tilted forward or backward.  · When in bed, try to find a position of comfort. A firm mattress is best. Try lying flat on your back with pillows under your knees. You can also try lying on your side with your knees bent up towards your chest and a pillow between your knees.  · At first, do not try to stretch out the sore  spots. If there is a strain, it is not like the good soreness you get after exercising without an injury. In this case, stretching may make it worse.  · Avoid prolonged sitting, long car rides or travel. This puts more stress on the lower back than standing or walking.  · During the first 24 to 72 hours after an injury, apply an ice pack to the painful area for 20 minutes and then remove it for 20 minutes over a period of 60 to 90 minutes or several times a day.   · You can alternate ice and heat therapies. Talk with your healthcare provider about the best treatment for your back or neck pain. As a safety precaution, do not use a heating pad at bedtime. Sleeping with a heating pad can lead to skin burns or tissue damage.  · Therapeutic massage can help relax the back and neck muscles without stretching them.  · Be aware of safe lifting methods and do not lift anything over 15 pounds until all the pain is gone.  Medications  Talk to your healthcare provider before using medicine, especially if you have other medical problems or are taking other medicines.  · You may use over-the-counter medicine to control pain, unless another pain medicine was prescribed. If you have chronic conditions like diabetes, liver or kidney disease, stomach ulcers,  gastrointestinal bleeding, or are taking blood thinner medicines.  · Be careful if you are given pain medicines, narcotics, or medicine for muscle spasm. They can cause drowsiness, and can affect your coordination, reflexes, and judgment. Do not drive or operate heavy machinery.  Follow-up care  Follow up with your healthcare provider, or as advised. Physical therapy or further tests may be needed.  If X-rays were taken, you will be notified of any new findings that may affect your care.  Call 911  Seek emergency medical care if any of the following occur:  · Trouble breathing  · Confusion  · Very drowsy or trouble awakening  · Fainting or loss of consciousness  · Rapid or very  "slow heart rate  · Loss of bowel or bladder control  When to seek medical advice  Call your healthcare provider right away if any of these occur:  · Pain becomes worse or spreads into your arms or legs  · Weakness, numbness or pain in one or both arms or legs  · Numbness in the groin area  · Difficulty walking  · Fever of 100.4ºF (38ºC) or higher, or as directed by your healthcare provider  Date Last Reviewed: 7/1/2016 © 2000-2017 [employer]. [address] [place] [address]. All rights reserved. This information is not intended as a substitute for professional medical care. Always follow your healthcare professional's instructions.    NASAL ALLERGY    Nasal Allergy, also called "Allergic Rhinitis" occurs after exposure to pollen, molds, mildew, animal "dander" (scales from animal skin, hair and feathers), dust, smoke and fumes. (These are called "allergens"). When pollen causes a nasal allergy it is commonly called "Hay Fever".    When these particles contact the lining of the nose, eyes, eyelids, sinuses or throat, they cause the cells to release a chemical called "histamine". Histamine may cause a watery discharge from the eyes or nose. It may also cause violent sneezing, nasal congestion, itching of the eyes, nose, throat and mouth.    PREVENTION:    Nasal allergy cannot be cured but symptoms can be reduced. Avoid or reduce exposure to the allergen when possible.    HOME CARE:    1) DECONGESTANT pills and sprays (Sudafed, NeoSynephrine), reduce tissue swelling and watery discharge. Overuse of nasal decongestant sprays may make symptoms worse, ESPECIALLY IF YOU HAVE HIGH BLOOD PRESSURE. Do not use these more often than recommended. Get an over the counter Nasal Saline spray to supplement Flonase    2) ANTIHISTAMINES block the release of histamine during the allergic response. Antihistamines are more effective when taken BEFORE symptoms develop. Unless a prescription antihistamine was " prescribed, you may take CLARITIN (loratadine). (Claritin is an over-the-counter antihistamine that does not cause drowsiness.) You can also consider Xyzal, Allegra, Zyrtec.    3) STEROID nasal sprays (Beconase, Vancenase, Nasalide, Nasocort, Flonase) or oral steroids (Prednisone) may also be prescribed for more severe symptoms. These help to reduce the local inflammation which adds to the allergic response.    4) If you have ASTHMA, pollen season may make your asthma symptoms worse. It is important that you use your asthma medicines as directed during this time to prevent or treat attacks. Some persons with asthma have a worsening of their asthma symptoms when taking antihistamines. If you notice this, stop the antihistamines and notify your doctor.    5) Consider a Chitto Rhino Nasal and Sinus Rinse 2-3 times/week if your symptoms are chronic. (https://chitorhino.com)       If not allergic,take tylenol (acetominophen) for fever control, chills, or body aches every 4 hours. Do not exceed 4000 mg/ day.If not allergic, take Motrin (Ibuprofen) every 4 hours for fever, chills, pain or inflammation. Do not exceed 2400 mg/day. You can alternate taking tylenol and motrin.  If you were prescribed a narcotic medication, do not drive or operate heavy equipment or machinery while taking these medications.  You must understand that you've received an Urgent Care treatment only and that you may be released before all your medical problems are known or treated. You, the patient, will arrange for follow up care as instructed.  Follow up with your PCP or specialty clinic as directed in the next 1-2 weeks if not improved or as needed.  You can call [phone] to schedule an appointment with the appropriate provider.  If your condition worsens we recommend that you receive another evaluation at the emergency room immediately or contact your primary medical clinics after hours call service to discuss your concerns.  Please return  here or go to the Emergency Department for any concerns or worsening of condition.

## 2019-07-11 ENCOUNTER — OFFICE VISIT (OUTPATIENT)
Dept: OBSTETRICS AND GYNECOLOGY | Facility: CLINIC | Age: 64
End: 2019-07-11
Payer: COMMERCIAL

## 2019-07-11 VITALS
WEIGHT: 171.06 LBS | DIASTOLIC BLOOD PRESSURE: 82 MMHG | SYSTOLIC BLOOD PRESSURE: 142 MMHG | HEIGHT: 64 IN | BODY MASS INDEX: 29.2 KG/M2

## 2019-07-11 DIAGNOSIS — Z11.51 SCREENING FOR HPV (HUMAN PAPILLOMAVIRUS): ICD-10-CM

## 2019-07-11 DIAGNOSIS — Z01.419 ENCOUNTER FOR GYNECOLOGICAL EXAMINATION: Primary | ICD-10-CM

## 2019-07-11 DIAGNOSIS — Z12.4 ENCOUNTER FOR PAPANICOLAOU SMEAR FOR CERVICAL CANCER SCREENING: ICD-10-CM

## 2019-07-11 DIAGNOSIS — Z91.89 INCREASED RISK OF BREAST CANCER: ICD-10-CM

## 2019-07-11 PROCEDURE — 3079F PR MOST RECENT DIASTOLIC BLOOD PRESSURE 80-89 MM HG: ICD-10-PCS | Mod: CPTII,S$GLB,, | Performed by: OBSTETRICS & GYNECOLOGY

## 2019-07-11 PROCEDURE — 87624 HPV HI-RISK TYP POOLED RSLT: CPT

## 2019-07-11 PROCEDURE — 3077F SYST BP >= 140 MM HG: CPT | Mod: CPTII,S$GLB,, | Performed by: OBSTETRICS & GYNECOLOGY

## 2019-07-11 PROCEDURE — 99999 PR PBB SHADOW E&M-EST. PATIENT-LVL III: CPT | Mod: PBBFAC,,, | Performed by: OBSTETRICS & GYNECOLOGY

## 2019-07-11 PROCEDURE — 99999 PR PBB SHADOW E&M-EST. PATIENT-LVL III: ICD-10-PCS | Mod: PBBFAC,,, | Performed by: OBSTETRICS & GYNECOLOGY

## 2019-07-11 PROCEDURE — 3077F PR MOST RECENT SYSTOLIC BLOOD PRESSURE >= 140 MM HG: ICD-10-PCS | Mod: CPTII,S$GLB,, | Performed by: OBSTETRICS & GYNECOLOGY

## 2019-07-11 PROCEDURE — 88175 CYTOPATH C/V AUTO FLUID REDO: CPT | Performed by: PATHOLOGY

## 2019-07-11 PROCEDURE — 88141 LIQUID-BASED PAP SMEAR, SCREENING: ICD-10-PCS | Mod: ,,, | Performed by: PATHOLOGY

## 2019-07-11 PROCEDURE — 3079F DIAST BP 80-89 MM HG: CPT | Mod: CPTII,S$GLB,, | Performed by: OBSTETRICS & GYNECOLOGY

## 2019-07-11 PROCEDURE — 99387 PR PREVENTIVE VISIT,NEW,65 & OVER: ICD-10-PCS | Mod: S$GLB,,, | Performed by: OBSTETRICS & GYNECOLOGY

## 2019-07-11 PROCEDURE — 99387 INIT PM E/M NEW PAT 65+ YRS: CPT | Mod: S$GLB,,, | Performed by: OBSTETRICS & GYNECOLOGY

## 2019-07-11 PROCEDURE — 88141 CYTOPATH C/V INTERPRET: CPT | Mod: ,,, | Performed by: PATHOLOGY

## 2019-07-11 NOTE — PROGRESS NOTES
Subjective:       Patient ID: Bhavani Hill is a 64 y.o. female.    Chief Complaint:  Well Woman (pap 2014 mammo 2018 )      History of Present Illness  - here for annual. No complaints.    Past Medical History:   Diagnosis Date    Essential (primary) hypertension     since ~ 2000    Family history of breast cancer     Mother at age 58, Sister age 35    H/O bone density study 2015    R hip: 0.20   //   L hip:  0.02   //  [place] 2[place]46    [place] [place]     History of cholelithiasis     Hypothyroidism     since     Menopause     Pap smear for cervical cancer screening 2015    [place] [place]        Past Surgical History:   Procedure Laterality Date     SECTION      x 2    CHOLECYSTECTOMY           Current Outpatient Medications:     levothyroxine (SYNTHROID) 50 MCG tablet, Take 1 tablet (50 mcg total) by mouth once daily., Disp: 90 tablet, Rfl: 3    lisinopril-hydrochlorothiazide (PRINZIDE,ZESTORETIC) 20-12.5 mg per tablet, Take 2 tablets by mouth once daily., Disp: 180 tablet, Rfl: 3    metoprolol succinate (TOPROL-XL) 25 MG 24 hr tablet, Take 1 tablet (25 mg total) by mouth once daily., Disp: 90 tablet, Rfl: 3    cyclobenzaprine (FLEXERIL) 10 MG tablet, Take 1 tablet (10 mg total) by mouth 3 (three) times daily as needed for Muscle spasms., Disp: 30 tablet, Rfl: 0    fluticasone propionate (FLONASE) 50 mcg/actuation nasal spray, 1 spray (50 mcg total) by Each Nare route 2 (two) times daily as needed for Rhinitis or Allergies., Disp: 1 Bottle, Rfl: 1    meloxicam (MOBIC) 15 MG tablet, Take 1 tablet (15 mg total) by mouth once daily. Take with food, Disp: 30 tablet, Rfl: 0    sodium chloride (OCEAN NASAL) 0.65 % nasal spray, 1 spray by Nasal route as needed for Congestion., Disp: 45 mL, Rfl: 3    Review of patient's allergies indicates:   Allergen Reactions    Guaifenesin        GYN & OB History  No LMP recorded. Patient is postmenopausal.   Date of Last Pap: No result  found    OB History    Para Term  AB Living   2 2 2     2   SAB TAB Ectopic Multiple Live Births           2      # Outcome Date GA Lbr Carloz/2nd Weight Sex Delivery Anes PTL Lv   2 Term 1989 40w0d  3.175 kg (7 lb) F CS-LTranv Spinal  JOCE   1 Term 1985 40w0d  3.175 kg (7 lb) F CS-LTranv Spinal  JOCE      Obstetric Comments   Menarche ~ 11       Social History     Socioeconomic History    Marital status:      Spouse name: Not on file    Number of children: Not on file    Years of education: Not on file    Highest education level: Not on file   Occupational History    Occupation: teacher   Social Needs    Financial resource strain: Not on file    Food insecurity:     Worry: Not on file     Inability: Not on file    Transportation needs:     Medical: Not on file     Non-medical: Not on file   Tobacco Use    Smoking status: Never Smoker    Smokeless tobacco: Never Used   Substance and Sexual Activity    Alcohol use: Yes     Comment: 1 martini daily    Drug use: Never    Sexual activity: Yes     Birth control/protection: Post-menopausal   Lifestyle    Physical activity:     Days per week: Not on file     Minutes per session: Not on file    Stress: Not on file   Relationships    Social connections:     Talks on phone: Not on file     Gets together: Not on file     Attends Jehovah's witness service: Not on file     Active member of club or organization: Not on file     Attends meetings of clubs or organizations: Not on file     Relationship status: Not on file   Other Topics Concern    Not on file   Social History Narrative    Not on file       Family History   Problem Relation Age of Onset    Breast cancer Mother 58    Breast cancer Sister 35    Colon cancer Neg Hx     Ovarian cancer Neg Hx        Review of Systems  Review of Systems   Respiratory: Negative for shortness of breath.    Cardiovascular: Negative for chest pain and palpitations.   Gastrointestinal: Negative for blood in  "stool, nausea and vomiting.   Genitourinary:        - see HPI   Skin: Negative for rash and wound.   Allergic/Immunologic: Negative for immunocompromised state.   Neurological: Negative for dizziness and syncope.   Hematological: Negative for adenopathy.   Psychiatric/Behavioral: Negative for behavioral problems.        Objective:     Vitals:    07/11/19 1401   BP: (!) 142/82   Weight: 77.6 kg (171 lb 1.2 oz)   Height: 5' 4" (1.626 m)       Physical Exam:   Constitutional: She is oriented to person, place, and time. She appears well-developed and well-nourished.        Pulmonary/Chest: Right breast exhibits no mass, no nipple discharge, no skin change, no tenderness and no swelling. Left breast exhibits no mass, no nipple discharge, no skin change, no tenderness and no swelling. Breasts are symmetrical.        Abdominal: Soft. She exhibits no distension. There is no tenderness.     Genitourinary: Vagina normal and uterus normal. There is no tenderness or lesion on the right labia. There is no tenderness or lesion on the left labia. Cervix is normal. Right adnexum displays no mass, no tenderness and no fullness. Left adnexum displays no mass, no tenderness and no fullness. No vaginal discharge found. Additional cervical findings: pap smear done  Genitourinary Comments: atrophic           Musculoskeletal: Moves all extremeties.       Neurological: She is alert and oriented to person, place, and time.     Psychiatric: She has a normal mood and affect.        Assessment/ Plan:     Orders Placed This Encounter    HPV High Risk Genotypes, PCR    Ambulatory Referral to Breast Surgery    Liquid-based pap smear, screening       Bhavani was seen today for well woman.    Diagnoses and all orders for this visit:    Encounter for gynecological examination    Encounter for Papanicolaou smear for cervical cancer screening  -     Liquid-based pap smear, screening    Screening for HPV (human papillomavirus)  -     HPV High Risk " Genotypes, PCR    Increased risk of breast cancer  -     Ambulatory Referral to Breast Surgery    - reviewed last mammogram. Lifetime breast cancer risk is greater than 20%. Offered referral to breast surgery clinic. Patient agrees with plan.    Follow up in about 1 year (around 7/11/2020) for annual exam.

## 2019-07-18 LAB
HPV HR 12 DNA CVX QL NAA+PROBE: NEGATIVE
HPV16 AG SPEC QL: NEGATIVE
HPV18 DNA SPEC QL NAA+PROBE: NEGATIVE

## 2020-05-21 DIAGNOSIS — I10 ESSENTIAL HYPERTENSION: ICD-10-CM

## 2020-05-21 DIAGNOSIS — E03.9 ACQUIRED HYPOTHYROIDISM: ICD-10-CM

## 2020-05-21 RX ORDER — LEVOTHYROXINE SODIUM 50 UG/1
TABLET ORAL
Qty: 90 TABLET | Refills: 0 | Status: SHIPPED | OUTPATIENT
Start: 2020-05-21 | End: 2020-09-10

## 2020-05-21 RX ORDER — LISINOPRIL AND HYDROCHLOROTHIAZIDE 12.5; 2 MG/1; MG/1
2 TABLET ORAL DAILY
Qty: 180 TABLET | Refills: 0 | Status: SHIPPED | OUTPATIENT
Start: 2020-05-21 | End: 2020-09-10

## 2020-05-21 RX ORDER — METOPROLOL SUCCINATE 25 MG/1
TABLET, EXTENDED RELEASE ORAL
Qty: 90 TABLET | Refills: 0 | Status: SHIPPED | OUTPATIENT
Start: 2020-05-21 | End: 2020-09-10

## 2020-06-13 ENCOUNTER — HOSPITAL ENCOUNTER (OUTPATIENT)
Dept: RADIOLOGY | Facility: HOSPITAL | Age: 65
Discharge: HOME | End: 2020-06-13
Attending: OBSTETRICS & GYNECOLOGY
Payer: MEDICARE

## 2020-06-13 DIAGNOSIS — Z12.31 ENCOUNTER FOR SCREENING MAMMOGRAM FOR MALIGNANT NEOPLASM OF BREAST: ICD-10-CM

## 2020-06-13 PROCEDURE — 77067 MAMMO DIGITAL SCREENING BILAT WITH TOMOSYNTHESIS_CAD: ICD-10-PCS | Mod: 26,,, | Performed by: RADIOLOGY

## 2020-06-13 PROCEDURE — 77063 BREAST TOMOSYNTHESIS BI: CPT | Mod: 26,,, | Performed by: RADIOLOGY

## 2020-06-13 PROCEDURE — 77067 SCR MAMMO BI INCL CAD: CPT | Mod: TC,PO

## 2020-06-13 PROCEDURE — 77063 MAMMO DIGITAL SCREENING BILAT WITH TOMOSYNTHESIS_CAD: ICD-10-PCS | Mod: 26,,, | Performed by: RADIOLOGY

## 2020-06-13 PROCEDURE — 77067 SCR MAMMO BI INCL CAD: CPT | Mod: 26,,, | Performed by: RADIOLOGY

## 2020-09-10 DIAGNOSIS — E03.9 ACQUIRED HYPOTHYROIDISM: ICD-10-CM

## 2020-09-10 DIAGNOSIS — I10 ESSENTIAL HYPERTENSION: ICD-10-CM

## 2020-09-10 RX ORDER — LISINOPRIL AND HYDROCHLOROTHIAZIDE 12.5; 2 MG/1; MG/1
2 TABLET ORAL DAILY
Qty: 180 TABLET | Refills: 0 | Status: SHIPPED | OUTPATIENT
Start: 2020-09-10 | End: 2020-12-04

## 2020-09-10 RX ORDER — METOPROLOL SUCCINATE 25 MG/1
TABLET, EXTENDED RELEASE ORAL
Qty: 90 TABLET | Refills: 0 | Status: SHIPPED | OUTPATIENT
Start: 2020-09-10 | End: 2020-12-04

## 2020-09-10 RX ORDER — LEVOTHYROXINE SODIUM 50 UG/1
TABLET ORAL
Qty: 90 TABLET | Refills: 0 | Status: SHIPPED | OUTPATIENT
Start: 2020-09-10 | End: 2020-12-04

## 2020-09-25 ENCOUNTER — TELEPHONE (OUTPATIENT)
Dept: OBSTETRICS AND GYNECOLOGY | Facility: CLINIC | Age: 65
End: 2020-09-25

## 2020-09-25 NOTE — TELEPHONE ENCOUNTER
For the past month she has a mild soreness on outside of right breast but not in a specific spot.  Feels like a bruise.  She can't feel or see anything.  had a normal mammo in June. She reports excessive caffeine consumption.  For the past 2-3 months she has doubled or tripled her consumption of caffeine.  Denies a change in weight but is wearing old bras. Recommended se invest in a new bra to make sure its fitting properly.  Verbalized understanding.

## 2020-12-03 DIAGNOSIS — I10 ESSENTIAL HYPERTENSION: ICD-10-CM

## 2020-12-03 DIAGNOSIS — E03.9 ACQUIRED HYPOTHYROIDISM: ICD-10-CM

## 2020-12-04 RX ORDER — LISINOPRIL AND HYDROCHLOROTHIAZIDE 12.5; 2 MG/1; MG/1
2 TABLET ORAL DAILY
Qty: 180 TABLET | Refills: 0 | Status: SHIPPED | OUTPATIENT
Start: 2020-12-04 | End: 2021-03-19 | Stop reason: SDUPTHER

## 2020-12-04 RX ORDER — METOPROLOL SUCCINATE 25 MG/1
TABLET, EXTENDED RELEASE ORAL
Qty: 90 TABLET | Refills: 0 | Status: SHIPPED | OUTPATIENT
Start: 2020-12-04 | End: 2021-03-19 | Stop reason: SDUPTHER

## 2020-12-04 RX ORDER — LEVOTHYROXINE SODIUM 50 UG/1
TABLET ORAL
Qty: 90 TABLET | Refills: 0 | Status: SHIPPED | OUTPATIENT
Start: 2020-12-04 | End: 2021-03-19 | Stop reason: SDUPTHER

## 2020-12-21 ENCOUNTER — OFFICE VISIT (OUTPATIENT)
Dept: URGENT CARE | Facility: CLINIC | Age: 65
End: 2020-12-21
Payer: MEDICARE

## 2020-12-21 VITALS
DIASTOLIC BLOOD PRESSURE: 86 MMHG | WEIGHT: 171 LBS | HEIGHT: 64 IN | RESPIRATION RATE: 16 BRPM | BODY MASS INDEX: 29.19 KG/M2 | SYSTOLIC BLOOD PRESSURE: 150 MMHG | HEART RATE: 78 BPM | OXYGEN SATURATION: 100 % | TEMPERATURE: 99 F

## 2020-12-21 DIAGNOSIS — J32.9 CLINICAL SINUSITIS: Primary | ICD-10-CM

## 2020-12-21 DIAGNOSIS — R51.9 ACUTE INTRACTABLE HEADACHE, UNSPECIFIED HEADACHE TYPE: ICD-10-CM

## 2020-12-21 LAB
CTP QC/QA: YES
SARS-COV-2 RDRP RESP QL NAA+PROBE: NEGATIVE

## 2020-12-21 PROCEDURE — 3008F BODY MASS INDEX DOCD: CPT | Mod: CPTII,S$GLB,, | Performed by: PHYSICIAN ASSISTANT

## 2020-12-21 PROCEDURE — 96372 PR INJECTION,THERAP/PROPH/DIAG2ST, IM OR SUBCUT: ICD-10-PCS | Mod: S$GLB,,, | Performed by: PHYSICIAN ASSISTANT

## 2020-12-21 PROCEDURE — 96372 THER/PROPH/DIAG INJ SC/IM: CPT | Mod: S$GLB,,, | Performed by: PHYSICIAN ASSISTANT

## 2020-12-21 PROCEDURE — U0002: ICD-10-PCS | Mod: QW,S$GLB,, | Performed by: PHYSICIAN ASSISTANT

## 2020-12-21 PROCEDURE — 99214 OFFICE O/P EST MOD 30 MIN: CPT | Mod: 25,S$GLB,, | Performed by: PHYSICIAN ASSISTANT

## 2020-12-21 PROCEDURE — U0002 COVID-19 LAB TEST NON-CDC: HCPCS | Mod: QW,S$GLB,, | Performed by: PHYSICIAN ASSISTANT

## 2020-12-21 PROCEDURE — 99214 PR OFFICE/OUTPT VISIT, EST, LEVL IV, 30-39 MIN: ICD-10-PCS | Mod: 25,S$GLB,, | Performed by: PHYSICIAN ASSISTANT

## 2020-12-21 PROCEDURE — 3008F PR BODY MASS INDEX (BMI) DOCUMENTED: ICD-10-PCS | Mod: CPTII,S$GLB,, | Performed by: PHYSICIAN ASSISTANT

## 2020-12-21 RX ORDER — BETAMETHASONE SODIUM PHOSPHATE AND BETAMETHASONE ACETATE 3; 3 MG/ML; MG/ML
9 INJECTION, SUSPENSION INTRA-ARTICULAR; INTRALESIONAL; INTRAMUSCULAR; SOFT TISSUE ONCE
Status: COMPLETED | OUTPATIENT
Start: 2020-12-21 | End: 2020-12-21

## 2020-12-21 RX ORDER — AZITHROMYCIN 250 MG/1
TABLET, FILM COATED ORAL
Qty: 6 TABLET | Refills: 0 | Status: SHIPPED | OUTPATIENT
Start: 2020-12-21 | End: 2021-03-19

## 2020-12-21 RX ORDER — BETAMETHASONE SODIUM PHOSPHATE AND BETAMETHASONE ACETATE 3; 3 MG/ML; MG/ML
9 INJECTION, SUSPENSION INTRA-ARTICULAR; INTRALESIONAL; INTRAMUSCULAR; SOFT TISSUE ONCE
Status: DISCONTINUED | OUTPATIENT
Start: 2020-12-21 | End: 2020-12-21

## 2020-12-21 RX ADMIN — BETAMETHASONE SODIUM PHOSPHATE AND BETAMETHASONE ACETATE 9 MG: 3; 3 INJECTION, SUSPENSION INTRA-ARTICULAR; INTRALESIONAL; INTRAMUSCULAR; SOFT TISSUE at 01:12

## 2020-12-21 NOTE — PROGRESS NOTES
"Subjective:       Patient ID: Bhavani Hill is a 65 y.o. female.    Vitals:  height is 5' 4" (1.626 m) and weight is 77.6 kg (171 lb). Her temporal temperature is 98.6 °F (37 °C). Her blood pressure is 150/86 (abnormal) and her pulse is 78. Her respiration is 16 and oxygen saturation is 100%.     Chief Complaint: Headache    Bilateral ear pain and headache for 1 week. Pain 2/10    Headache   This is a new problem. The current episode started in the past 7 days. The problem occurs constantly. The problem has been unchanged. The pain is located in the bilateral region. The pain does not radiate. The quality of the pain is described as aching. The pain is at a severity of 2/10. The pain is mild. Associated symptoms include ear pain. Pertinent negatives include no abdominal pain, abnormal behavior, anorexia, back pain, blurred vision, coughing, dizziness, drainage, fever, hearing loss, loss of balance, nausea, neck pain, numbness, phonophobia, photophobia, rhinorrhea, scalp tenderness, seizures, sore throat, swollen glands, tingling or tinnitus. Nothing aggravates the symptoms. She has tried nothing for the symptoms. The treatment provided no relief. There is no history of cancer, cluster headaches, hypertension, immunosuppression, migraine headaches, migraines in the family, obesity, pseudotumor cerebri, recent head traumas or TMJ.   Otalgia   There is pain in both ears. The current episode started in the past 7 days. The problem occurs constantly. The problem has been unchanged. There has been no fever. The pain is at a severity of 2/10. The pain is mild. Associated symptoms include headaches. Pertinent negatives include no abdominal pain, coughing, drainage, hearing loss, neck pain, rhinorrhea or sore throat. She has tried nothing for the symptoms. The treatment provided no relief. There is no history of a chronic ear infection or a tympanostomy tube.       Constitution: Negative for fever.   HENT: Positive for ear " pain. Negative for tinnitus, hearing loss and sore throat.    Neck: Negative for neck pain.   Eyes: Negative for photophobia and blurred vision.   Respiratory: Negative for cough.    Gastrointestinal: Negative for abdominal pain and nausea.   Musculoskeletal: Negative for back pain.   Neurological: Positive for headaches. Negative for dizziness, loss of balance, history of migraines, numbness and seizures.       Objective:      Physical Exam   Constitutional: She is oriented to person, place, and time. She appears well-developed.  Non-toxic appearance. She does not appear ill. No distress.   HENT:   Head: Normocephalic and atraumatic.   Ears:   Right Ear: Hearing and external ear normal. Tympanic membrane is not erythematous and not bulging. A middle ear effusion (serous) is present.   Left Ear: Hearing and external ear normal. Tympanic membrane is not erythematous and not bulging. A middle ear effusion (serous) is present.   Nose: Right sinus exhibits maxillary sinus tenderness (mild). Right sinus exhibits no frontal sinus tenderness. Left sinus exhibits maxillary sinus tenderness (mild). Left sinus exhibits no frontal sinus tenderness.   Mouth/Throat: Uvula is midline, oropharynx is clear and moist and mucous membranes are normal.   Eyes: Pupils are equal, round, and reactive to light. Conjunctivae and EOM are normal.   Neck: Normal range of motion. Neck supple. No neck rigidity.   Cardiovascular: Normal rate and regular rhythm.   Pulmonary/Chest: Effort normal and breath sounds normal. No respiratory distress.   Musculoskeletal: Normal range of motion.   Neurological: She is alert and oriented to person, place, and time.   Skin: Skin is warm, dry and not diaphoretic. Psychiatric: Her speech is normal and behavior is normal. Mood normal.   Nursing note and vitals reviewed.    Office Visit on 12/21/2020   Component Date Value Ref Range Status    POC Rapid COVID 12/21/2020 Negative  Negative Final    Quality  Control Acceptable 12/21/2020 Yes   Final           Assessment:       1. Clinical sinusitis    2. Acute intractable headache, unspecified headache type        Plan:       All hx was provided by the pt or available as part of established EMR. The pt past medical hx, family hx, social hx, and current medications were reviewed. Interpretation of diagnostics performed today were discussed. Tx discussed. Quarantine recommendations based on CDC guidelines discussed. Pt to follow up with [facility] or PCP if no improvement or for any concern. Seek treatment in an ER for worsening. Pt voiced understanding of all discussed, and agreed.    Clinical sinusitis  -     Discontinue: betamethasone acetate-betamethasone sodium phosphate injection 9 mg  -     betamethasone acetate-betamethasone sodium phosphate injection 9 mg  -     azithromycin (Z-WESTLEY) 250 MG tablet; Take 2 tablets by mouth on day 1; Take 1 tablet by mouth on days 2-5  Dispense: 6 tablet; Refill: 0    Acute intractable headache, unspecified headache type  -     POCT COVID-19 Rapid Screening      Patient Instructions   · Follow up with your primary care if symptoms do not improve, or you may return here at any time.  · If you were referred to a specialist, please follow up with that specialty.  · If you were prescribed antibiotics, please take them to completion.  · If you were prescribed a narcotic or any medication with sedative effects, do not drive or operate heavy equipment or machinery while taking these medications.  · You must understand that you have received treatment at an Urgent Care facility only, and that you may be released before all of your medical problems are known or treated. Urgent Care facilities are not equipped to handle life threatening emergencies. It is recommended that you seek care at an Emergency Department for further evaluation of worsening or concerning symptoms, or possibly life threatening conditions as discussed.                                         If you  smoke, please stop smoking               PLEASE PRACTICE SOCIAL DISTANCING      COVID19 testing is NEGATIVE and NO known high risk exposure to covid-19 virus, can exclude from work/school until:  o MINIMUM OF 24-48 hours fever-free without the use of fever-reducing medications AND  o Improvement in symptoms (e.g. cough, shortness of breath, fatigue, GI symptoms, etc)      You should follow CDC guidelines as well as your employer/school protocols for safely returning to work/school. Please be aware that there are False Negative possibilities with testing and you should return to work based upon CDC guidelines, not simply a negative result, unless your employer/school has a different RTW protocol/guidance for you. If you are able to return to work, you should still quarantine outside of work based on CDC guidance as we discussed.       Over the counter and home treatment of symptoms:  Alternate tylenol and motrin every 3 hours  Salt water gargles  Cold-eeze helps to reduce the duration of sore throat symptoms  Cepachol helps to numb the discomfort  Chloroseptic spray  Nasal saline spray reduces inflammation and dryness  Warm face compresses as often as you can  Vicks vapor rub at night  Flonase OTC or Nasacort OTC  Simple foods like chicken noodle soup help  Pedialyte helps with dehydration if lacking appetite  Rest as much as you can      Wait and See Antibiotic:  You have been given a prescription for antibiotics. Your symptoms will likely resolve without antibiotics. It is recommended that you monitor yourself closely for 3-5 days and fill this prescription and start the antibiotic only if signs of infection, as discussed at your visit, are present. It is important to follow these instructions due to the problem of increasing antibiotic resistance.

## 2020-12-21 NOTE — PATIENT INSTRUCTIONS
· Follow up with your primary care if symptoms do not improve, or you may return here at any time.  · If you were referred to a specialist, please follow up with that specialty.  · If you were prescribed antibiotics, please take them to completion.  · If you were prescribed a narcotic or any medication with sedative effects, do not drive or operate heavy equipment or machinery while taking these medications.  · You must understand that you have received treatment at an Urgent Care facility only, and that you may be released before all of your medical problems are known or treated. Urgent Care facilities are not equipped to handle life threatening emergencies. It is recommended that you seek care at an Emergency Department for further evaluation of worsening or concerning symptoms, or possibly life threatening conditions as discussed.                                        If you  smoke, please stop smoking               PLEASE PRACTICE SOCIAL DISTANCING      COVID19 testing is NEGATIVE and NO known high risk exposure to covid-19 virus, can exclude from work/school until:  o MINIMUM OF 24-48 hours fever-free without the use of fever-reducing medications AND  o Improvement in symptoms (e.g. cough, shortness of breath, fatigue, GI symptoms, etc)      You should follow CDC guidelines as well as your employer/school protocols for safely returning to work/school. Please be aware that there are False Negative possibilities with testing and you should return to work based upon CDC guidelines, not simply a negative result, unless your employer/school has a different RTW protocol/guidance for you. If you are able to return to work, you should still quarantine outside of work based on CDC guidance as we discussed.       Over the counter and home treatment of symptoms:  Alternate tylenol and motrin every 3 hours  Salt water gargles  Cold-eeze helps to reduce the duration of sore throat symptoms  Cepachol helps to numb the  discomfort  Chloroseptic spray  Nasal saline spray reduces inflammation and dryness  Warm face compresses as often as you can  Vicks vapor rub at night  Flonase OTC or Nasacort OTC  Simple foods like chicken noodle soup help  Pedialyte helps with dehydration if lacking appetite  Rest as much as you can      Wait and See Antibiotic:  You have been given a prescription for antibiotics. Your symptoms will likely resolve without antibiotics. It is recommended that you monitor yourself closely for 3-5 days and fill this prescription and start the antibiotic only if signs of infection, as discussed at your visit, are present. It is important to follow these instructions due to the problem of increasing antibiotic resistance.

## 2021-03-19 ENCOUNTER — OFFICE VISIT (OUTPATIENT)
Dept: FAMILY MEDICINE | Facility: CLINIC | Age: 66
End: 2021-03-19
Payer: MEDICARE

## 2021-03-19 ENCOUNTER — LAB VISIT (OUTPATIENT)
Dept: LAB | Facility: HOSPITAL | Age: 66
End: 2021-03-19
Attending: FAMILY MEDICINE
Payer: MEDICARE

## 2021-03-19 VITALS
HEIGHT: 64 IN | HEART RATE: 72 BPM | SYSTOLIC BLOOD PRESSURE: 130 MMHG | BODY MASS INDEX: 29.43 KG/M2 | DIASTOLIC BLOOD PRESSURE: 84 MMHG | WEIGHT: 172.38 LBS | TEMPERATURE: 98 F | OXYGEN SATURATION: 98 %

## 2021-03-19 DIAGNOSIS — Z12.11 COLON CANCER SCREENING: ICD-10-CM

## 2021-03-19 DIAGNOSIS — I10 ESSENTIAL HYPERTENSION: ICD-10-CM

## 2021-03-19 DIAGNOSIS — Z23 IMMUNIZATION DUE: ICD-10-CM

## 2021-03-19 DIAGNOSIS — Z78.0 POSTMENOPAUSAL: Primary | ICD-10-CM

## 2021-03-19 DIAGNOSIS — Z79.899 DRUG THERAPY: ICD-10-CM

## 2021-03-19 DIAGNOSIS — E03.9 ACQUIRED HYPOTHYROIDISM: ICD-10-CM

## 2021-03-19 LAB
ALBUMIN SERPL BCP-MCNC: 3.3 G/DL (ref 3.5–5.2)
ALP SERPL-CCNC: 71 U/L (ref 55–135)
ALT SERPL W/O P-5'-P-CCNC: 17 U/L (ref 10–44)
ANION GAP SERPL CALC-SCNC: 7 MMOL/L (ref 8–16)
AST SERPL-CCNC: 26 U/L (ref 10–40)
BILIRUB SERPL-MCNC: 0.9 MG/DL (ref 0.1–1)
BUN SERPL-MCNC: 12 MG/DL (ref 8–23)
CALCIUM SERPL-MCNC: 8.8 MG/DL (ref 8.7–10.5)
CHLORIDE SERPL-SCNC: 104 MMOL/L (ref 95–110)
CHOLEST SERPL-MCNC: 213 MG/DL (ref 120–199)
CHOLEST/HDLC SERPL: 2.8 {RATIO} (ref 2–5)
CO2 SERPL-SCNC: 28 MMOL/L (ref 23–29)
CREAT SERPL-MCNC: 0.7 MG/DL (ref 0.5–1.4)
EST. GFR  (AFRICAN AMERICAN): >60 ML/MIN/1.73 M^2
EST. GFR  (NON AFRICAN AMERICAN): >60 ML/MIN/1.73 M^2
GLUCOSE SERPL-MCNC: 97 MG/DL (ref 70–110)
HDLC SERPL-MCNC: 76 MG/DL (ref 40–75)
HDLC SERPL: 35.7 % (ref 20–50)
LDLC SERPL CALC-MCNC: 123.8 MG/DL (ref 63–159)
NONHDLC SERPL-MCNC: 137 MG/DL
POTASSIUM SERPL-SCNC: 3.8 MMOL/L (ref 3.5–5.1)
PROT SERPL-MCNC: 5.9 G/DL (ref 6–8.4)
SODIUM SERPL-SCNC: 139 MMOL/L (ref 136–145)
TRIGL SERPL-MCNC: 66 MG/DL (ref 30–150)

## 2021-03-19 PROCEDURE — 80053 COMPREHEN METABOLIC PANEL: CPT | Performed by: FAMILY MEDICINE

## 2021-03-19 PROCEDURE — 1101F PR PT FALLS ASSESS DOC 0-1 FALLS W/OUT INJ PAST YR: ICD-10-PCS | Mod: CPTII,S$GLB,, | Performed by: FAMILY MEDICINE

## 2021-03-19 PROCEDURE — 3288F FALL RISK ASSESSMENT DOCD: CPT | Mod: CPTII,S$GLB,, | Performed by: FAMILY MEDICINE

## 2021-03-19 PROCEDURE — 99499 RISK ADDL DX/OHS AUDIT: ICD-10-PCS | Mod: S$GLB,,, | Performed by: FAMILY MEDICINE

## 2021-03-19 PROCEDURE — 36415 COLL VENOUS BLD VENIPUNCTURE: CPT | Mod: PN | Performed by: FAMILY MEDICINE

## 2021-03-19 PROCEDURE — 1159F PR MEDICATION LIST DOCUMENTED IN MEDICAL RECORD: ICD-10-PCS | Mod: S$GLB,,, | Performed by: FAMILY MEDICINE

## 2021-03-19 PROCEDURE — 80061 LIPID PANEL: CPT | Performed by: FAMILY MEDICINE

## 2021-03-19 PROCEDURE — 84443 ASSAY THYROID STIM HORMONE: CPT | Performed by: FAMILY MEDICINE

## 2021-03-19 PROCEDURE — 1101F PT FALLS ASSESS-DOCD LE1/YR: CPT | Mod: CPTII,S$GLB,, | Performed by: FAMILY MEDICINE

## 2021-03-19 PROCEDURE — 3079F DIAST BP 80-89 MM HG: CPT | Mod: CPTII,S$GLB,, | Performed by: FAMILY MEDICINE

## 2021-03-19 PROCEDURE — 3079F PR MOST RECENT DIASTOLIC BLOOD PRESSURE 80-89 MM HG: ICD-10-PCS | Mod: CPTII,S$GLB,, | Performed by: FAMILY MEDICINE

## 2021-03-19 PROCEDURE — 3075F PR MOST RECENT SYSTOLIC BLOOD PRESS GE 130-139MM HG: ICD-10-PCS | Mod: CPTII,S$GLB,, | Performed by: FAMILY MEDICINE

## 2021-03-19 PROCEDURE — 84439 ASSAY OF FREE THYROXINE: CPT | Performed by: FAMILY MEDICINE

## 2021-03-19 PROCEDURE — 1159F MED LIST DOCD IN RCRD: CPT | Mod: S$GLB,,, | Performed by: FAMILY MEDICINE

## 2021-03-19 PROCEDURE — 99214 PR OFFICE/OUTPT VISIT, EST, LEVL IV, 30-39 MIN: ICD-10-PCS | Mod: S$GLB,,, | Performed by: FAMILY MEDICINE

## 2021-03-19 PROCEDURE — 99214 OFFICE O/P EST MOD 30 MIN: CPT | Mod: S$GLB,,, | Performed by: FAMILY MEDICINE

## 2021-03-19 PROCEDURE — 83036 HEMOGLOBIN GLYCOSYLATED A1C: CPT | Performed by: FAMILY MEDICINE

## 2021-03-19 PROCEDURE — 3008F BODY MASS INDEX DOCD: CPT | Mod: CPTII,S$GLB,, | Performed by: FAMILY MEDICINE

## 2021-03-19 PROCEDURE — 99499 UNLISTED E&M SERVICE: CPT | Mod: S$GLB,,, | Performed by: FAMILY MEDICINE

## 2021-03-19 PROCEDURE — 99999 PR PBB SHADOW E&M-EST. PATIENT-LVL IV: CPT | Mod: PBBFAC,,, | Performed by: FAMILY MEDICINE

## 2021-03-19 PROCEDURE — 3075F SYST BP GE 130 - 139MM HG: CPT | Mod: CPTII,S$GLB,, | Performed by: FAMILY MEDICINE

## 2021-03-19 PROCEDURE — 84481 FREE ASSAY (FT-3): CPT | Performed by: FAMILY MEDICINE

## 2021-03-19 PROCEDURE — 3288F PR FALLS RISK ASSESSMENT DOCUMENTED: ICD-10-PCS | Mod: CPTII,S$GLB,, | Performed by: FAMILY MEDICINE

## 2021-03-19 PROCEDURE — 3008F PR BODY MASS INDEX (BMI) DOCUMENTED: ICD-10-PCS | Mod: CPTII,S$GLB,, | Performed by: FAMILY MEDICINE

## 2021-03-19 PROCEDURE — 99999 PR PBB SHADOW E&M-EST. PATIENT-LVL IV: ICD-10-PCS | Mod: PBBFAC,,, | Performed by: FAMILY MEDICINE

## 2021-03-19 RX ORDER — LEVOTHYROXINE SODIUM 50 UG/1
50 TABLET ORAL DAILY
Qty: 90 TABLET | Refills: 3 | Status: SHIPPED | OUTPATIENT
Start: 2021-03-19 | End: 2022-03-11

## 2021-03-19 RX ORDER — METOPROLOL SUCCINATE 25 MG/1
25 TABLET, EXTENDED RELEASE ORAL DAILY
Qty: 90 TABLET | Refills: 3 | Status: SHIPPED | OUTPATIENT
Start: 2021-03-19 | End: 2022-03-11

## 2021-03-19 RX ORDER — LISINOPRIL AND HYDROCHLOROTHIAZIDE 12.5; 2 MG/1; MG/1
2 TABLET ORAL DAILY
Qty: 180 TABLET | Refills: 3 | Status: SHIPPED | OUTPATIENT
Start: 2021-03-19 | End: 2022-03-11

## 2021-03-20 LAB
ESTIMATED AVG GLUCOSE: 100 MG/DL (ref 68–131)
HBA1C MFR BLD: 5.1 % (ref 4–5.6)
T3FREE SERPL-MCNC: 2.6 PG/ML (ref 2.3–4.2)
T4 FREE SERPL-MCNC: 1.03 NG/DL (ref 0.71–1.51)
TSH SERPL DL<=0.005 MIU/L-ACNC: 2.5 UIU/ML (ref 0.4–4)

## 2021-03-30 DIAGNOSIS — Z12.11 COLON CANCER SCREENING: ICD-10-CM

## 2021-04-06 ENCOUNTER — PATIENT MESSAGE (OUTPATIENT)
Dept: ADMINISTRATIVE | Facility: HOSPITAL | Age: 66
End: 2021-04-06

## 2021-06-12 ENCOUNTER — PATIENT OUTREACH (OUTPATIENT)
Dept: ADMINISTRATIVE | Facility: HOSPITAL | Age: 66
End: 2021-06-12

## 2021-07-07 ENCOUNTER — PATIENT MESSAGE (OUTPATIENT)
Dept: ADMINISTRATIVE | Facility: HOSPITAL | Age: 66
End: 2021-07-07

## 2021-10-06 ENCOUNTER — OFFICE VISIT (OUTPATIENT)
Dept: URGENT CARE | Facility: CLINIC | Age: 66
End: 2021-10-06
Payer: MEDICARE

## 2021-10-06 VITALS
OXYGEN SATURATION: 99 % | SYSTOLIC BLOOD PRESSURE: 152 MMHG | BODY MASS INDEX: 29.02 KG/M2 | HEIGHT: 64 IN | TEMPERATURE: 98 F | HEART RATE: 62 BPM | WEIGHT: 170 LBS | DIASTOLIC BLOOD PRESSURE: 95 MMHG | RESPIRATION RATE: 19 BRPM

## 2021-10-06 DIAGNOSIS — T28.0XXA BURN OF MOUTH, INITIAL ENCOUNTER: ICD-10-CM

## 2021-10-06 DIAGNOSIS — J32.9 SINUSITIS, UNSPECIFIED CHRONICITY, UNSPECIFIED LOCATION: ICD-10-CM

## 2021-10-06 PROCEDURE — 3077F PR MOST RECENT SYSTOLIC BLOOD PRESSURE >= 140 MM HG: ICD-10-PCS | Mod: CPTII,S$GLB,, | Performed by: INTERNAL MEDICINE

## 2021-10-06 PROCEDURE — 1159F MED LIST DOCD IN RCRD: CPT | Mod: CPTII,S$GLB,, | Performed by: INTERNAL MEDICINE

## 2021-10-06 PROCEDURE — 4010F ACE/ARB THERAPY RXD/TAKEN: CPT | Mod: CPTII,S$GLB,, | Performed by: INTERNAL MEDICINE

## 2021-10-06 PROCEDURE — 3077F SYST BP >= 140 MM HG: CPT | Mod: CPTII,S$GLB,, | Performed by: INTERNAL MEDICINE

## 2021-10-06 PROCEDURE — 3080F DIAST BP >= 90 MM HG: CPT | Mod: CPTII,S$GLB,, | Performed by: INTERNAL MEDICINE

## 2021-10-06 PROCEDURE — 99214 PR OFFICE/OUTPT VISIT, EST, LEVL IV, 30-39 MIN: ICD-10-PCS | Mod: S$GLB,,, | Performed by: INTERNAL MEDICINE

## 2021-10-06 PROCEDURE — 1160F PR REVIEW ALL MEDS BY PRESCRIBER/CLIN PHARMACIST DOCUMENTED: ICD-10-PCS | Mod: CPTII,S$GLB,, | Performed by: INTERNAL MEDICINE

## 2021-10-06 PROCEDURE — 3044F PR MOST RECENT HEMOGLOBIN A1C LEVEL <7.0%: ICD-10-PCS | Mod: CPTII,S$GLB,, | Performed by: INTERNAL MEDICINE

## 2021-10-06 PROCEDURE — 1159F PR MEDICATION LIST DOCUMENTED IN MEDICAL RECORD: ICD-10-PCS | Mod: CPTII,S$GLB,, | Performed by: INTERNAL MEDICINE

## 2021-10-06 PROCEDURE — 3044F HG A1C LEVEL LT 7.0%: CPT | Mod: CPTII,S$GLB,, | Performed by: INTERNAL MEDICINE

## 2021-10-06 PROCEDURE — 4010F PR ACE/ARB THEARPY RXD/TAKEN: ICD-10-PCS | Mod: CPTII,S$GLB,, | Performed by: INTERNAL MEDICINE

## 2021-10-06 PROCEDURE — 3008F BODY MASS INDEX DOCD: CPT | Mod: CPTII,S$GLB,, | Performed by: INTERNAL MEDICINE

## 2021-10-06 PROCEDURE — 3080F PR MOST RECENT DIASTOLIC BLOOD PRESSURE >= 90 MM HG: ICD-10-PCS | Mod: CPTII,S$GLB,, | Performed by: INTERNAL MEDICINE

## 2021-10-06 PROCEDURE — 3008F PR BODY MASS INDEX (BMI) DOCUMENTED: ICD-10-PCS | Mod: CPTII,S$GLB,, | Performed by: INTERNAL MEDICINE

## 2021-10-06 PROCEDURE — 1160F RVW MEDS BY RX/DR IN RCRD: CPT | Mod: CPTII,S$GLB,, | Performed by: INTERNAL MEDICINE

## 2021-10-06 PROCEDURE — 99214 OFFICE O/P EST MOD 30 MIN: CPT | Mod: S$GLB,,, | Performed by: INTERNAL MEDICINE

## 2021-10-06 RX ORDER — PNEUMOCOCCAL VACCINE POLYVALENT 25; 25; 25; 25; 25; 25; 25; 25; 25; 25; 25; 25; 25; 25; 25; 25; 25; 25; 25; 25; 25; 25; 25 UG/.5ML; UG/.5ML; UG/.5ML; UG/.5ML; UG/.5ML; UG/.5ML; UG/.5ML; UG/.5ML; UG/.5ML; UG/.5ML; UG/.5ML; UG/.5ML; UG/.5ML; UG/.5ML; UG/.5ML; UG/.5ML; UG/.5ML; UG/.5ML; UG/.5ML; UG/.5ML; UG/.5ML; UG/.5ML; UG/.5ML
INJECTION, SOLUTION INTRAMUSCULAR; SUBCUTANEOUS
COMMUNITY
Start: 2021-05-03 | End: 2022-05-19

## 2021-10-06 RX ORDER — AZITHROMYCIN 250 MG/1
TABLET, FILM COATED ORAL
Qty: 6 TABLET | Refills: 0 | Status: SHIPPED | OUTPATIENT
Start: 2021-10-06 | End: 2021-12-28

## 2021-11-05 ENCOUNTER — PES CALL (OUTPATIENT)
Dept: ADMINISTRATIVE | Facility: CLINIC | Age: 66
End: 2021-11-05
Payer: MEDICARE

## 2021-12-10 ENCOUNTER — TELEPHONE (OUTPATIENT)
Dept: FAMILY MEDICINE | Facility: CLINIC | Age: 66
End: 2021-12-10
Payer: MEDICARE

## 2021-12-10 ENCOUNTER — PATIENT MESSAGE (OUTPATIENT)
Dept: FAMILY MEDICINE | Facility: CLINIC | Age: 66
End: 2021-12-10
Payer: MEDICARE

## 2021-12-10 DIAGNOSIS — Z78.0 POSTMENOPAUSAL: Primary | ICD-10-CM

## 2021-12-11 ENCOUNTER — OFFICE VISIT (OUTPATIENT)
Dept: URGENT CARE | Facility: CLINIC | Age: 66
End: 2021-12-11
Payer: MEDICARE

## 2021-12-11 VITALS
TEMPERATURE: 101 F | HEIGHT: 64 IN | RESPIRATION RATE: 16 BRPM | BODY MASS INDEX: 29.02 KG/M2 | DIASTOLIC BLOOD PRESSURE: 88 MMHG | OXYGEN SATURATION: 97 % | HEART RATE: 91 BPM | WEIGHT: 170 LBS | SYSTOLIC BLOOD PRESSURE: 161 MMHG

## 2021-12-11 DIAGNOSIS — U07.1 COVID-19 VIRUS INFECTION: Primary | ICD-10-CM

## 2021-12-11 DIAGNOSIS — U07.1 COVID-19 VIRUS DETECTED: ICD-10-CM

## 2021-12-11 DIAGNOSIS — R05.9 COUGH: ICD-10-CM

## 2021-12-11 DIAGNOSIS — R50.9 FEVER, UNSPECIFIED FEVER CAUSE: ICD-10-CM

## 2021-12-11 DIAGNOSIS — R06.2 WHEEZING: ICD-10-CM

## 2021-12-11 LAB
CTP QC/QA: YES
CTP QC/QA: YES
POC MOLECULAR INFLUENZA A AGN: NEGATIVE
POC MOLECULAR INFLUENZA B AGN: NEGATIVE
SARS-COV-2 RDRP RESP QL NAA+PROBE: POSITIVE

## 2021-12-11 PROCEDURE — U0002: ICD-10-PCS | Mod: QW,S$GLB,, | Performed by: PHYSICIAN ASSISTANT

## 2021-12-11 PROCEDURE — 99213 PR OFFICE/OUTPT VISIT, EST, LEVL III, 20-29 MIN: ICD-10-PCS | Mod: S$GLB,,, | Performed by: PHYSICIAN ASSISTANT

## 2021-12-11 PROCEDURE — 4010F PR ACE/ARB THEARPY RXD/TAKEN: ICD-10-PCS | Mod: CPTII,S$GLB,, | Performed by: PHYSICIAN ASSISTANT

## 2021-12-11 PROCEDURE — U0002 COVID-19 LAB TEST NON-CDC: HCPCS | Mod: QW,S$GLB,, | Performed by: PHYSICIAN ASSISTANT

## 2021-12-11 PROCEDURE — 87502 INFLUENZA DNA AMP PROBE: CPT | Mod: QW,S$GLB,, | Performed by: PHYSICIAN ASSISTANT

## 2021-12-11 PROCEDURE — 87502 POCT INFLUENZA A/B MOLECULAR: ICD-10-PCS | Mod: QW,S$GLB,, | Performed by: PHYSICIAN ASSISTANT

## 2021-12-11 PROCEDURE — 99213 OFFICE O/P EST LOW 20 MIN: CPT | Mod: S$GLB,,, | Performed by: PHYSICIAN ASSISTANT

## 2021-12-11 PROCEDURE — 4010F ACE/ARB THERAPY RXD/TAKEN: CPT | Mod: CPTII,S$GLB,, | Performed by: PHYSICIAN ASSISTANT

## 2021-12-11 RX ORDER — ALBUTEROL SULFATE 90 UG/1
2 AEROSOL, METERED RESPIRATORY (INHALATION) EVERY 6 HOURS PRN
Qty: 18 G | Refills: 3 | Status: SHIPPED | OUTPATIENT
Start: 2021-12-11 | End: 2022-05-19

## 2021-12-11 RX ORDER — ACETAMINOPHEN 500 MG
1000 TABLET ORAL
Status: COMPLETED | OUTPATIENT
Start: 2021-12-11 | End: 2021-12-11

## 2021-12-11 RX ADMIN — Medication 1000 MG: at 12:12

## 2021-12-17 ENCOUNTER — HOSPITAL ENCOUNTER (OUTPATIENT)
Dept: RADIOLOGY | Facility: HOSPITAL | Age: 66
Discharge: HOME | End: 2021-12-17
Attending: FAMILY MEDICINE
Payer: MEDICARE

## 2021-12-17 ENCOUNTER — HOSPITAL ENCOUNTER (OUTPATIENT)
Dept: RADIOLOGY | Facility: HOSPITAL | Age: 66
Discharge: HOME | End: 2021-12-17
Attending: OBSTETRICS & GYNECOLOGY
Payer: MEDICARE

## 2021-12-17 DIAGNOSIS — Z12.31 ENCOUNTER FOR SCREENING MAMMOGRAM FOR MALIGNANT NEOPLASM OF BREAST: ICD-10-CM

## 2021-12-17 DIAGNOSIS — Z78.0 POSTMENOPAUSAL: ICD-10-CM

## 2021-12-17 PROCEDURE — 77067 SCR MAMMO BI INCL CAD: CPT | Mod: TC,PO

## 2021-12-17 PROCEDURE — 77080 DEXA BONE DENSITY SPINE HIP: ICD-10-PCS | Mod: 26,,, | Performed by: RADIOLOGY

## 2021-12-17 PROCEDURE — 77063 MAMMO DIGITAL SCREENING BILAT WITH TOMO: ICD-10-PCS | Mod: 26,,, | Performed by: RADIOLOGY

## 2021-12-17 PROCEDURE — 77080 DXA BONE DENSITY AXIAL: CPT | Mod: 26,,, | Performed by: RADIOLOGY

## 2021-12-17 PROCEDURE — 77067 MAMMO DIGITAL SCREENING BILAT WITH TOMO: ICD-10-PCS | Mod: 26,,, | Performed by: RADIOLOGY

## 2021-12-17 PROCEDURE — 77080 DXA BONE DENSITY AXIAL: CPT | Mod: TC,PO

## 2021-12-17 PROCEDURE — 77063 BREAST TOMOSYNTHESIS BI: CPT | Mod: 26,,, | Performed by: RADIOLOGY

## 2021-12-17 PROCEDURE — 77067 SCR MAMMO BI INCL CAD: CPT | Mod: 26,,, | Performed by: RADIOLOGY

## 2022-02-11 ENCOUNTER — PES CALL (OUTPATIENT)
Dept: ADMINISTRATIVE | Facility: CLINIC | Age: 67
End: 2022-02-11
Payer: MEDICARE

## 2022-03-07 DIAGNOSIS — E03.9 ACQUIRED HYPOTHYROIDISM: ICD-10-CM

## 2022-03-07 DIAGNOSIS — I10 ESSENTIAL HYPERTENSION: ICD-10-CM

## 2022-03-07 NOTE — TELEPHONE ENCOUNTER
Care Due:                  Date            Visit Type   Department     Provider  --------------------------------------------------------------------------------                                NP -                              PRIMARY      [facility]  Last Visit: 03-      CARE ([facility])   MEDICINE       Deovn Marley  Next Visit: None Scheduled  None         None Found                                                            Last  Test          Frequency    Reason                     Performed    Due Date  --------------------------------------------------------------------------------    Office Visit  12 months..  levothyroxine,             03- 03-                             lisinopriL-hydrochlorothi                             azide, metoprolol........    CMP.........  12 months..  lisinopriL-hydrochlorothi  03-   03-                             azide....................    TSH.........  12 months..  levothyroxine............  03-   03-    Powered by [employer]. Reference number: [id number].   3/07/2022 5:31:59 AM CST

## 2022-03-11 RX ORDER — METOPROLOL SUCCINATE 25 MG/1
TABLET, EXTENDED RELEASE ORAL
Qty: 90 TABLET | Refills: 0 | Status: SHIPPED | OUTPATIENT
Start: 2022-03-11 | End: 2022-05-19 | Stop reason: SDUPTHER

## 2022-03-11 RX ORDER — LISINOPRIL AND HYDROCHLOROTHIAZIDE 12.5; 2 MG/1; MG/1
2 TABLET ORAL DAILY
Qty: 180 TABLET | Refills: 0 | Status: SHIPPED | OUTPATIENT
Start: 2022-03-11 | End: 2022-05-19 | Stop reason: SDUPTHER

## 2022-03-11 RX ORDER — LEVOTHYROXINE SODIUM 50 UG/1
TABLET ORAL
Qty: 90 TABLET | Refills: 0 | Status: SHIPPED | OUTPATIENT
Start: 2022-03-11 | End: 2022-05-19 | Stop reason: SDUPTHER

## 2022-03-11 NOTE — TELEPHONE ENCOUNTER
Provider Staff:     Action is required for this patient.   Please see care gap opportunities below in Care Due Message.     Thanks!  [facility]     Appointments      Date Provider   Last Visit   3/19/2021 Devon Marley MD   Next Visit   Visit date not found Devon Marley MD     Note composed:5:11 PM 03/11/2022

## 2022-03-11 NOTE — TELEPHONE ENCOUNTER
Refill Authorization Note   Bhavani Hill  is requesting a refill authorization.  Brief Assessment and Rationale for Refill:  Approve    -Medication-Related Problems Identified:   Requires labs  Requires appointment  Medication Therapy Plan:  Appt. (annual). Labs (cmp, tsh)    Medication Reconciliation Completed: No   Comments:   --->Care Gap information included below if applicable.   Orders Placed This Encounter    lisinopriL-hydrochlorothiazide (PRINZIDE,ZESTORETIC) 20-12.5 mg per tablet    metoprolol succinate (TOPROL-XL) 25 MG 24 hr tablet    levothyroxine (SYNTHROID) 50 MCG tablet      Requested Prescriptions   Signed Prescriptions Disp Refills    lisinopriL-hydrochlorothiazide (PRINZIDE,ZESTORETIC) 20-12.5 mg per tablet 180 tablet 0     Sig: Take 2 tablets by mouth once daily       Cardiovascular:  ACEI + Diuretic Combos Passed - 3/7/2022  5:31 AM        Passed - Patient is at least 18 years old        Passed - Last BP in normal range within 360 days     BP Readings from Last 1 Encounters:   12/28/21 120/62               Passed - Valid encounter within last 15 months     Recent Visits  Date Type Provider Dept   03/19/21 Office Visit Devon Marley MD [facility]   Showing recent visits within past 720 days and meeting all other requirements  Future Appointments  No visits were found meeting these conditions.  Showing future appointments within next 150 days and meeting all other requirements                Passed - Na is between 130 and 148 and within 360 days     Sodium   Date Value Ref Range Status   03/19/2021 139 136 - 145 mmol/L Final   01/03/2019 140 136 - 145 mmol/L Final   12/30/2017 140 136 - 145 mmol/L Final              Passed - K in normal range and within 360 days     Potassium   Date Value Ref Range Status   03/19/2021 3.8 3.5 - 5.1 mmol/L Final   01/03/2019 4.1 3.5 - 5.1 mmol/L Final   12/30/2017 4.5 3.5 - 5.1 mmol/L Final              Passed - Cr is 1.39 or below and within 360 days      Lab Results   Component Value Date    CREATININE 0.7 03/19/2021    CREATININE 0.8 01/03/2019    CREATININE 0.8 12/30/2017              Passed - eGFR within 360 days     Lab Results   Component Value Date    EGFRNONAA >60.0 03/19/2021    EGFRNONAA >60.0 01/03/2019    EGFRNONAA >60.0 12/30/2017                  metoprolol succinate (TOPROL-XL) 25 MG 24 hr tablet 90 tablet 0     Sig: Take 1 tablet by mouth once daily       Cardiovascular:  Beta Blockers Passed - 3/7/2022  5:31 AM        Passed - Patient is at least 18 years old        Passed - Last BP in normal range within 360 days     BP Readings from Last 1 Encounters:   12/28/21 120/62               Passed - Last Heart Rate in normal range within 360 days     Pulse Readings from Last 1 Encounters:   12/28/21 77              Passed - Valid encounter within last 15 months     Recent Visits  Date Type Provider Dept   03/19/21 Office Visit Devon Marley MD [facility]   Showing recent visits within past 720 days and meeting all other requirements  Future Appointments  No visits were found meeting these conditions.  Showing future appointments within next 150 days and meeting all other requirements                  levothyroxine (SYNTHROID) 50 MCG tablet 90 tablet 0     Sig: Take 1 tablet by mouth once daily       Endocrinology:  Hypothyroid Agents Passed - 3/7/2022  5:31 AM        Passed - Patient is at least 18 years old        Passed - Valid encounter within last 15 months     Recent Visits  Date Type Provider Dept   03/19/21 Office Visit Devon Marley MD [facility]   Showing recent visits within past 720 days and meeting all other requirements  Future Appointments  No visits were found meeting these conditions.  Showing future appointments within next 150 days and meeting all other requirements                Passed - Manual Review: FT4 is not required if last TSH is WNL.        Passed - TSH in normal range and within 360 days     TSH    Date Value Ref Range Status   03/19/2021 2.503 0.400 - 4.000 uIU/mL Final   01/03/2019 2.114 0.400 - 4.000 uIU/mL Final   12/30/2017 3.006 0.400 - 4.000 uIU/mL Final              Passed - T4 free within 1080 days     Free T4   Date Value Ref Range Status   03/19/2021 1.03 0.71 - 1.51 ng/dL Final                  Appointments  past 12m or future 3m with PCP    Date Provider   Last Visit   3/19/2021 Devon Marley MD   Next Visit   Visit date not found Devon Marley MD   ED visits in past 90 days: 0     Note composed:5:11 PM 03/11/2022

## 2022-05-03 ENCOUNTER — PES CALL (OUTPATIENT)
Dept: ADMINISTRATIVE | Facility: CLINIC | Age: 67
End: 2022-05-03
Payer: MEDICARE

## 2022-05-05 ENCOUNTER — PATIENT MESSAGE (OUTPATIENT)
Dept: ADMINISTRATIVE | Facility: HOSPITAL | Age: 67
End: 2022-05-05
Payer: MEDICARE

## 2022-05-05 ENCOUNTER — PATIENT OUTREACH (OUTPATIENT)
Dept: ADMINISTRATIVE | Facility: HOSPITAL | Age: 67
End: 2022-05-05
Payer: MEDICARE

## 2022-05-05 NOTE — PROGRESS NOTES
2022 Care Everywhere updates requested and reviewed.  Immunizations reconciled. Media reports reviewed.  Duplicate HM overrides and  orders removed.  Overdue HM topic chart audit and/or requested.  Overdue lab testing linked to upcoming lab appointments if applies.    Health Maintenance Due   Topic Date Due    Colorectal Cancer Screening  2016    COVID-19 Vaccine (3 - Booster for Moderna series) 2021

## 2022-05-19 ENCOUNTER — OFFICE VISIT (OUTPATIENT)
Dept: FAMILY MEDICINE | Facility: CLINIC | Age: 67
End: 2022-05-19
Payer: MEDICARE

## 2022-05-19 ENCOUNTER — LAB VISIT (OUTPATIENT)
Dept: LAB | Facility: HOSPITAL | Age: 67
End: 2022-05-19
Attending: FAMILY MEDICINE
Payer: MEDICARE

## 2022-05-19 VITALS
BODY MASS INDEX: 28.46 KG/M2 | HEART RATE: 66 BPM | DIASTOLIC BLOOD PRESSURE: 70 MMHG | WEIGHT: 166.69 LBS | SYSTOLIC BLOOD PRESSURE: 124 MMHG | HEIGHT: 64 IN | RESPIRATION RATE: 17 BRPM

## 2022-05-19 DIAGNOSIS — F40.248 OTHER SITUATIONAL TYPE PHOBIA: ICD-10-CM

## 2022-05-19 DIAGNOSIS — E03.9 HYPOTHYROIDISM, UNSPECIFIED TYPE: ICD-10-CM

## 2022-05-19 DIAGNOSIS — Z79.899 DRUG THERAPY: ICD-10-CM

## 2022-05-19 DIAGNOSIS — Z12.11 COLON CANCER SCREENING: ICD-10-CM

## 2022-05-19 DIAGNOSIS — F51.01 PRIMARY INSOMNIA: ICD-10-CM

## 2022-05-19 DIAGNOSIS — I10 ESSENTIAL HYPERTENSION: Primary | ICD-10-CM

## 2022-05-19 DIAGNOSIS — Z23 IMMUNIZATION DUE: ICD-10-CM

## 2022-05-19 DIAGNOSIS — E03.9 ACQUIRED HYPOTHYROIDISM: ICD-10-CM

## 2022-05-19 LAB
ALBUMIN SERPL BCP-MCNC: 3.4 G/DL (ref 3.5–5.2)
ALP SERPL-CCNC: 69 U/L (ref 55–135)
ALT SERPL W/O P-5'-P-CCNC: 20 U/L (ref 10–44)
ANION GAP SERPL CALC-SCNC: 11 MMOL/L (ref 8–16)
AST SERPL-CCNC: 28 U/L (ref 10–40)
BASOPHILS # BLD AUTO: 0.04 K/UL (ref 0–0.2)
BASOPHILS NFR BLD: 0.9 % (ref 0–1.9)
BILIRUB SERPL-MCNC: 0.9 MG/DL (ref 0.1–1)
BUN SERPL-MCNC: 11 MG/DL (ref 8–23)
CALCIUM SERPL-MCNC: 9.9 MG/DL (ref 8.7–10.5)
CHLORIDE SERPL-SCNC: 102 MMOL/L (ref 95–110)
CHOLEST SERPL-MCNC: 210 MG/DL (ref 120–199)
CHOLEST/HDLC SERPL: 2.6 {RATIO} (ref 2–5)
CO2 SERPL-SCNC: 25 MMOL/L (ref 23–29)
CREAT SERPL-MCNC: 0.8 MG/DL (ref 0.5–1.4)
DIFFERENTIAL METHOD: ABNORMAL
EOSINOPHIL # BLD AUTO: 0.1 K/UL (ref 0–0.5)
EOSINOPHIL NFR BLD: 1.3 % (ref 0–8)
ERYTHROCYTE [DISTWIDTH] IN BLOOD BY AUTOMATED COUNT: 13.5 % (ref 11.5–14.5)
EST. GFR  (AFRICAN AMERICAN): >60 ML/MIN/1.73 M^2
EST. GFR  (NON AFRICAN AMERICAN): >60 ML/MIN/1.73 M^2
ESTIMATED AVG GLUCOSE: 100 MG/DL (ref 68–131)
GLUCOSE SERPL-MCNC: 90 MG/DL (ref 70–110)
HBA1C MFR BLD: 5.1 % (ref 4–5.6)
HCT VFR BLD AUTO: 39.9 % (ref 37–48.5)
HDLC SERPL-MCNC: 80 MG/DL (ref 40–75)
HDLC SERPL: 38.1 % (ref 20–50)
HGB BLD-MCNC: 12.7 G/DL (ref 12–16)
IMM GRANULOCYTES # BLD AUTO: 0 K/UL (ref 0–0.04)
IMM GRANULOCYTES NFR BLD AUTO: 0 % (ref 0–0.5)
LDLC SERPL CALC-MCNC: 99 MG/DL (ref 63–159)
LYMPHOCYTES # BLD AUTO: 1.5 K/UL (ref 1–4.8)
LYMPHOCYTES NFR BLD: 33.8 % (ref 18–48)
MCH RBC QN AUTO: 27 PG (ref 27–31)
MCHC RBC AUTO-ENTMCNC: 31.8 G/DL (ref 32–36)
MCV RBC AUTO: 85 FL (ref 82–98)
MONOCYTES # BLD AUTO: 0.4 K/UL (ref 0.3–1)
MONOCYTES NFR BLD: 9.7 % (ref 4–15)
NEUTROPHILS # BLD AUTO: 2.5 K/UL (ref 1.8–7.7)
NEUTROPHILS NFR BLD: 54.3 % (ref 38–73)
NONHDLC SERPL-MCNC: 130 MG/DL
NRBC BLD-RTO: 0 /100 WBC
PLATELET # BLD AUTO: 254 K/UL (ref 150–450)
PMV BLD AUTO: 11.4 FL (ref 9.2–12.9)
POTASSIUM SERPL-SCNC: 4.2 MMOL/L (ref 3.5–5.1)
PROT SERPL-MCNC: 6.2 G/DL (ref 6–8.4)
RBC # BLD AUTO: 4.7 M/UL (ref 4–5.4)
SODIUM SERPL-SCNC: 138 MMOL/L (ref 136–145)
T4 FREE SERPL-MCNC: 0.96 NG/DL (ref 0.71–1.51)
TRIGL SERPL-MCNC: 155 MG/DL (ref 30–150)
TSH SERPL DL<=0.005 MIU/L-ACNC: 2.1 UIU/ML (ref 0.4–4)
WBC # BLD AUTO: 4.52 K/UL (ref 3.9–12.7)

## 2022-05-19 PROCEDURE — 99999 PR PBB SHADOW E&M-EST. PATIENT-LVL III: CPT | Mod: PBBFAC,,, | Performed by: FAMILY MEDICINE

## 2022-05-19 PROCEDURE — 36415 COLL VENOUS BLD VENIPUNCTURE: CPT | Mod: PN | Performed by: FAMILY MEDICINE

## 2022-05-19 PROCEDURE — 99214 PR OFFICE/OUTPT VISIT, EST, LEVL IV, 30-39 MIN: ICD-10-PCS | Mod: S$GLB,,, | Performed by: FAMILY MEDICINE

## 2022-05-19 PROCEDURE — 84443 ASSAY THYROID STIM HORMONE: CPT | Performed by: FAMILY MEDICINE

## 2022-05-19 PROCEDURE — 83036 HEMOGLOBIN GLYCOSYLATED A1C: CPT | Performed by: FAMILY MEDICINE

## 2022-05-19 PROCEDURE — 80053 COMPREHEN METABOLIC PANEL: CPT | Performed by: FAMILY MEDICINE

## 2022-05-19 PROCEDURE — 99214 OFFICE O/P EST MOD 30 MIN: CPT | Mod: S$GLB,,, | Performed by: FAMILY MEDICINE

## 2022-05-19 PROCEDURE — 85025 COMPLETE CBC W/AUTO DIFF WBC: CPT | Performed by: FAMILY MEDICINE

## 2022-05-19 PROCEDURE — 84439 ASSAY OF FREE THYROXINE: CPT | Performed by: FAMILY MEDICINE

## 2022-05-19 PROCEDURE — 99999 PR PBB SHADOW E&M-EST. PATIENT-LVL III: ICD-10-PCS | Mod: PBBFAC,,, | Performed by: FAMILY MEDICINE

## 2022-05-19 PROCEDURE — 80061 LIPID PANEL: CPT | Performed by: FAMILY MEDICINE

## 2022-05-19 RX ORDER — LISINOPRIL AND HYDROCHLOROTHIAZIDE 12.5; 2 MG/1; MG/1
2 TABLET ORAL DAILY
Qty: 180 TABLET | Refills: 3 | Status: SHIPPED | OUTPATIENT
Start: 2022-05-19 | End: 2023-03-09 | Stop reason: SDUPTHER

## 2022-05-19 RX ORDER — LEVOTHYROXINE SODIUM 50 UG/1
50 TABLET ORAL DAILY
Qty: 90 TABLET | Refills: 3 | Status: SHIPPED | OUTPATIENT
Start: 2022-05-19 | End: 2023-03-09 | Stop reason: SDUPTHER

## 2022-05-19 RX ORDER — TRAZODONE HYDROCHLORIDE 50 MG/1
50 TABLET ORAL NIGHTLY
Qty: 90 TABLET | Refills: 3 | Status: SHIPPED | OUTPATIENT
Start: 2022-05-19 | End: 2023-03-09

## 2022-05-19 RX ORDER — METOPROLOL SUCCINATE 25 MG/1
25 TABLET, EXTENDED RELEASE ORAL DAILY
Qty: 90 TABLET | Refills: 3 | Status: SHIPPED | OUTPATIENT
Start: 2022-05-19 | End: 2023-03-09 | Stop reason: SDUPTHER

## 2022-05-19 NOTE — PROGRESS NOTES
[employer] was given to patient on 5/19/2022 12:59 PM           Answers for HPI/ROS submitted by the patient on 5/16/2022  Chronicity: recurrent  Onset: more than 1 year ago  Progression since onset: unchanged  Condition status: controlled  anxiety: Yes  CAD risks: stress

## 2022-05-19 NOTE — PROGRESS NOTES
THIS DOCUMENT WAS MADE IN PART WITH VOICE RECOGNITION SOFTWARE.  OCCASIONALLY THIS SOFTWARE WILL MISINTERPRET WORDS OR PHRASES.    Assessment and Plan:    1. Essential hypertension     2. Hypothyroidism, unspecified type     3. Colon cancer screening  Fecal Immunochemical Test (iFOBT)   4. Immunization due     5. Drug therapy  CBC Auto Differential    Comprehensive Metabolic Panel    Lipid Panel    Hemoglobin A1C    TSH    T4, Free    Urinalysis, Reflex to Urine Culture Urine, Clean Catch     Fit kit for colon cancer screening    Wellness labs ordered    Chronic conditions appear stable otherwise    Gave Xanax for use only in certain situations, specifically when riding in a car which causes her great anxiety.  Prescription should last approximately 4 months before refill request    New medication trazodone for insomnia  Follow-up in 4 weeks for re-evaluation      ______________________________________________________________________  Subjective:    Chief Complaint:  Chief Complaint   Patient presents with    Medication Refill     BP and thyroid meds        HPI:  Bhavani is a 66 y.o. year old     Essential hypertension  Rx-losartan-hydrochlorothiazide, metoprolol  Home : 130/80's   Denies exertional chest symptoms  Med compliant      Hypothyroidism  Rx - levothyroxine  Dose stable    Insomnia  duration : 5 months  Trouble with sleep onset and maintenance  Relates symptoms to stress    Requesting p.r.n. medication for stressful situations, specifically riding in car with her .  Reports long trips to other cities can cause great anxiety which causes her to avoid going to those places such as [place] and [facility] scope     Due for Covid Booster      Answers for HPI/ROS submitted by the patient on 5/16/2022  Chronicity: recurrent  Onset: more than 1 year ago  Progression since onset: unchanged  Condition status: controlled  anxiety: Yes  CAD risks: stress        Past Medical History:  Past  Medical History:   Diagnosis Date    Essential (primary) hypertension     since ~     Family history of breast cancer     Mother at age 58, Sister age 35    H/O bone density study 2015    R hip: 0.20   //   L hip:  0.02   //  spine 2.46    ([facility]     History of cholelithiasis     Hypothyroidism     since 2008    Menopause     Pap smear for cervical cancer screening 2015    Pap/Hpv negative  ([facility])        Past Surgical History:  Past Surgical History:   Procedure Laterality Date     SECTION      x 2    CHOLECYSTECTOMY         Family History:  Family History   Problem Relation Age of Onset    Breast cancer Mother 58    Cancer Mother         Breast    Breast cancer Sister 35    Cancer Sister         Breast    Colon cancer Neg Hx     Ovarian cancer Neg Hx        Social History:  Social History     Socioeconomic History    Marital status:    Occupational History    Occupation: teacher   Tobacco Use    Smoking status: Never Smoker    Smokeless tobacco: Never Used   Substance and Sexual Activity    Alcohol use: Yes     Alcohol/week: 0.0 standard drinks     Comment: 1 martini daily    Drug use: Never    Sexual activity: Yes     Birth control/protection: Post-menopausal       Medications:  Current Outpatient Medications on File Prior to Visit   Medication Sig Dispense Refill    levothyroxine (SYNTHROID) 50 MCG tablet Take 1 tablet by mouth once daily 90 tablet 0    lisinopriL-hydrochlorothiazide (PRINZIDE,ZESTORETIC) 20-12.5 mg per tablet Take 2 tablets by mouth once daily 180 tablet 0    metoprolol succinate (TOPROL-XL) 25 MG 24 hr tablet Take 1 tablet by mouth once daily 90 tablet 0    [DISCONTINUED] albuterol (PROVENTIL/VENTOLIN HFA) 90 mcg/actuation inhaler Inhale 2 puffs into the lungs every 6 (six) hours as needed for Wheezing or Shortness of Breath. Rescue 18 g 3    [DISCONTINUED] PNEUMOVAX-23 25 mcg/0.5 mL vaccine        No current facility-administered  "medications on file prior to visit.       Allergies:  Guaifenesin    Immunizations:  Immunization History   Administered Date(s) Administered    COVID-19, MRNA, LN-S, PF (MODERNA FULL 0.5 ML DOSE) 03/04/2021, 04/01/2021    Influenza 11/06/2015    Influenza (FLUAD) - Quadrivalent - Adjuvanted - PF *Preferred* (65+) 09/20/2021    Influenza - High Dose - PF (65 years and older) 09/30/2019    Influenza - Quadrivalent - MDCK - PF 09/14/2018    Influenza - Quadrivalent - PF *Preferred* (6 months and older) 02/02/2011, 11/06/2015, 09/06/2016, 11/05/2020    Influenza - Trivalent (ADULT) 02/02/2011    Influenza - Trivalent - PF (ADULT) 11/06/2015, 09/06/2016    Pneumococcal Conjugate - 13 Valent 03/05/2020    Pneumococcal Polysaccharide - 23 Valent 05/03/2021    Tdap 11/21/2016    Zoster Recombinant 08/03/2018, 10/05/2018       Review of Systems:  Review of Systems   All other systems reviewed and are negative.      Objective:    Vitals:  Vitals:    05/19/22 1156   BP: 124/70   Pulse: 66   Resp: 17   Weight: 75.6 kg (166 lb 10.7 oz)   Height: 5' 4" (1.626 m)   PainSc: 0-No pain       Physical Exam  Vitals reviewed.   Constitutional:       General: She is not in acute distress.  HENT:      Head: Normocephalic and atraumatic.   Eyes:      Pupils: Pupils are equal, round, and reactive to light.   Cardiovascular:      Rate and Rhythm: Normal rate and regular rhythm.      Heart sounds: No murmur heard.    No friction rub.   Pulmonary:      Effort: Pulmonary effort is normal.      Breath sounds: Normal breath sounds.   Abdominal:      General: Bowel sounds are normal. There is no distension.      Palpations: Abdomen is soft.      Tenderness: There is no abdominal tenderness.   Musculoskeletal:      Cervical back: Neck supple.   Skin:     General: Skin is warm and dry.      Findings: No rash.   Psychiatric:         Behavior: Behavior normal.             Devon Marley MD  Family Medicine      "

## 2022-05-20 RX ORDER — ALPRAZOLAM 0.5 MG/1
0.5 TABLET ORAL DAILY PRN
Qty: 15 TABLET | Refills: 1 | Status: SHIPPED | OUTPATIENT
Start: 2022-05-20 | End: 2023-07-28 | Stop reason: SDUPTHER

## 2022-05-31 ENCOUNTER — PATIENT MESSAGE (OUTPATIENT)
Dept: ADMINISTRATIVE | Facility: HOSPITAL | Age: 67
End: 2022-05-31
Payer: MEDICARE

## 2022-06-20 ENCOUNTER — PATIENT OUTREACH (OUTPATIENT)
Dept: ADMINISTRATIVE | Facility: HOSPITAL | Age: 67
End: 2022-06-20
Payer: MEDICARE

## 2022-06-21 ENCOUNTER — TELEPHONE (OUTPATIENT)
Dept: FAMILY MEDICINE | Facility: CLINIC | Age: 67
End: 2022-06-21
Payer: MEDICARE

## 2022-06-21 ENCOUNTER — LAB VISIT (OUTPATIENT)
Dept: LAB | Facility: HOSPITAL | Age: 67
End: 2022-06-21
Attending: FAMILY MEDICINE
Payer: MEDICARE

## 2022-06-21 DIAGNOSIS — R30.0 DYSURIA: ICD-10-CM

## 2022-06-21 DIAGNOSIS — R30.0 DYSURIA: Primary | ICD-10-CM

## 2022-06-21 LAB
BACTERIA #/AREA URNS HPF: ABNORMAL /HPF
BILIRUB UR QL STRIP: NEGATIVE
CLARITY UR: ABNORMAL
COLOR UR: YELLOW
GLUCOSE UR QL STRIP: NEGATIVE
HGB UR QL STRIP: ABNORMAL
KETONES UR QL STRIP: NEGATIVE
LEUKOCYTE ESTERASE UR QL STRIP: ABNORMAL
MICROSCOPIC COMMENT: ABNORMAL
NITRITE UR QL STRIP: NEGATIVE
PH UR STRIP: 7 [PH] (ref 5–8)
PROT UR QL STRIP: NEGATIVE
RBC #/AREA URNS HPF: 2 /HPF (ref 0–4)
SP GR UR STRIP: 1.01 (ref 1–1.03)
SQUAMOUS #/AREA URNS HPF: 1 /HPF
URN SPEC COLLECT METH UR: ABNORMAL
WBC #/AREA URNS HPF: 11 /HPF (ref 0–5)

## 2022-06-21 PROCEDURE — 87186 SC STD MICRODIL/AGAR DIL: CPT | Performed by: FAMILY MEDICINE

## 2022-06-21 PROCEDURE — 87086 URINE CULTURE/COLONY COUNT: CPT | Performed by: FAMILY MEDICINE

## 2022-06-21 PROCEDURE — 87077 CULTURE AEROBIC IDENTIFY: CPT | Performed by: FAMILY MEDICINE

## 2022-06-21 PROCEDURE — 81000 URINALYSIS NONAUTO W/SCOPE: CPT | Mod: PO | Performed by: FAMILY MEDICINE

## 2022-06-21 PROCEDURE — 87088 URINE BACTERIA CULTURE: CPT | Performed by: FAMILY MEDICINE

## 2022-06-21 NOTE — TELEPHONE ENCOUNTER
Spoke with pt.   States that she is experiencing burning, urgency and abdominal discomfort. Nocturia and frequency.

## 2022-06-22 ENCOUNTER — PATIENT MESSAGE (OUTPATIENT)
Dept: FAMILY MEDICINE | Facility: CLINIC | Age: 67
End: 2022-06-22
Payer: MEDICARE

## 2022-06-22 ENCOUNTER — TELEPHONE (OUTPATIENT)
Dept: FAMILY MEDICINE | Facility: CLINIC | Age: 67
End: 2022-06-22
Payer: MEDICARE

## 2022-06-22 DIAGNOSIS — N30.00 ACUTE CYSTITIS WITHOUT HEMATURIA: Primary | ICD-10-CM

## 2022-06-22 NOTE — TELEPHONE ENCOUNTER
----- Message from Carlos Weldon sent at 6/22/2022  4:01 PM CDT -----  Contact: [phone]/patient  Patient calling to speak with you regarding test results   Please advise

## 2022-06-23 ENCOUNTER — PATIENT MESSAGE (OUTPATIENT)
Dept: FAMILY MEDICINE | Facility: CLINIC | Age: 67
End: 2022-06-23
Payer: MEDICARE

## 2022-06-23 RX ORDER — CEPHALEXIN 500 MG/1
500 CAPSULE ORAL EVERY 12 HOURS
Qty: 10 CAPSULE | Refills: 0 | Status: SHIPPED | OUTPATIENT
Start: 2022-06-23 | End: 2023-03-09

## 2022-06-26 LAB — BACTERIA UR CULT: ABNORMAL

## 2022-07-14 ENCOUNTER — TELEPHONE (OUTPATIENT)
Dept: FAMILY MEDICINE | Facility: CLINIC | Age: 67
End: 2022-07-14
Payer: MEDICARE

## 2022-07-14 ENCOUNTER — PATIENT MESSAGE (OUTPATIENT)
Dept: FAMILY MEDICINE | Facility: CLINIC | Age: 67
End: 2022-07-14
Payer: MEDICARE

## 2022-07-14 ENCOUNTER — LAB VISIT (OUTPATIENT)
Dept: LAB | Facility: HOSPITAL | Age: 67
End: 2022-07-14
Attending: FAMILY MEDICINE
Payer: MEDICARE

## 2022-07-14 DIAGNOSIS — R30.0 DYSURIA: ICD-10-CM

## 2022-07-14 DIAGNOSIS — R30.0 DYSURIA: Primary | ICD-10-CM

## 2022-07-14 DIAGNOSIS — R35.0 FREQUENCY OF URINATION: ICD-10-CM

## 2022-07-14 LAB
BILIRUB UR QL STRIP: NEGATIVE
CLARITY UR REFRACT.AUTO: CLEAR
COLOR UR AUTO: NORMAL
GLUCOSE UR QL STRIP: NEGATIVE
HGB UR QL STRIP: NEGATIVE
KETONES UR QL STRIP: NEGATIVE
LEUKOCYTE ESTERASE UR QL STRIP: NEGATIVE
NITRITE UR QL STRIP: NEGATIVE
PH UR STRIP: 7 [PH] (ref 5–8)
PROT UR QL STRIP: NEGATIVE
SP GR UR STRIP: 1 (ref 1–1.03)
URN SPEC COLLECT METH UR: NORMAL

## 2022-07-14 PROCEDURE — 81003 URINALYSIS AUTO W/O SCOPE: CPT | Performed by: FAMILY MEDICINE

## 2022-08-24 ENCOUNTER — PES CALL (OUTPATIENT)
Dept: ADMINISTRATIVE | Facility: CLINIC | Age: 67
End: 2022-08-24
Payer: MEDICARE

## 2022-09-01 ENCOUNTER — PES CALL (OUTPATIENT)
Dept: ADMINISTRATIVE | Facility: CLINIC | Age: 67
End: 2022-09-01
Payer: MEDICARE

## 2022-09-19 ENCOUNTER — PES CALL (OUTPATIENT)
Dept: ADMINISTRATIVE | Facility: CLINIC | Age: 67
End: 2022-09-19
Payer: MEDICARE

## 2022-10-20 ENCOUNTER — PES CALL (OUTPATIENT)
Dept: ADMINISTRATIVE | Facility: CLINIC | Age: 67
End: 2022-10-20
Payer: MEDICARE

## 2022-11-21 ENCOUNTER — PATIENT MESSAGE (OUTPATIENT)
Dept: ADMINISTRATIVE | Facility: HOSPITAL | Age: 67
End: 2022-11-21
Payer: MEDICARE

## 2023-02-01 ENCOUNTER — OFFICE VISIT (OUTPATIENT)
Dept: URGENT CARE | Facility: CLINIC | Age: 68
End: 2023-02-01
Payer: MEDICARE

## 2023-02-01 VITALS
BODY MASS INDEX: 28.34 KG/M2 | DIASTOLIC BLOOD PRESSURE: 87 MMHG | WEIGHT: 166 LBS | HEART RATE: 62 BPM | HEIGHT: 64 IN | OXYGEN SATURATION: 99 % | SYSTOLIC BLOOD PRESSURE: 138 MMHG | TEMPERATURE: 98 F

## 2023-02-01 DIAGNOSIS — J02.9 SORE THROAT: ICD-10-CM

## 2023-02-01 DIAGNOSIS — J06.9 VIRAL URI: Primary | ICD-10-CM

## 2023-02-01 LAB
CTP QC/QA: YES
MOLECULAR STREP A: NEGATIVE
POC MOLECULAR INFLUENZA A AGN: NEGATIVE
POC MOLECULAR INFLUENZA B AGN: NEGATIVE
SARS-COV-2 AG RESP QL IA.RAPID: NEGATIVE

## 2023-02-01 PROCEDURE — 1125F PR PAIN SEVERITY QUANTIFIED, PAIN PRESENT: ICD-10-PCS | Mod: CPTII,S$GLB,, | Performed by: NURSE PRACTITIONER

## 2023-02-01 PROCEDURE — 3075F PR MOST RECENT SYSTOLIC BLOOD PRESS GE 130-139MM HG: ICD-10-PCS | Mod: CPTII,S$GLB,, | Performed by: NURSE PRACTITIONER

## 2023-02-01 PROCEDURE — 99213 PR OFFICE/OUTPT VISIT, EST, LEVL III, 20-29 MIN: ICD-10-PCS | Mod: S$GLB,,, | Performed by: NURSE PRACTITIONER

## 2023-02-01 PROCEDURE — 3075F SYST BP GE 130 - 139MM HG: CPT | Mod: CPTII,S$GLB,, | Performed by: NURSE PRACTITIONER

## 2023-02-01 PROCEDURE — 87811 SARS-COV-2 COVID19 W/OPTIC: CPT | Mod: QW,S$GLB,, | Performed by: NURSE PRACTITIONER

## 2023-02-01 PROCEDURE — 87502 INFLUENZA DNA AMP PROBE: CPT | Mod: QW,S$GLB,, | Performed by: NURSE PRACTITIONER

## 2023-02-01 PROCEDURE — 3008F BODY MASS INDEX DOCD: CPT | Mod: CPTII,S$GLB,, | Performed by: NURSE PRACTITIONER

## 2023-02-01 PROCEDURE — 87502 POCT INFLUENZA A/B MOLECULAR: ICD-10-PCS | Mod: QW,S$GLB,, | Performed by: NURSE PRACTITIONER

## 2023-02-01 PROCEDURE — 1160F RVW MEDS BY RX/DR IN RCRD: CPT | Mod: CPTII,S$GLB,, | Performed by: NURSE PRACTITIONER

## 2023-02-01 PROCEDURE — 1159F PR MEDICATION LIST DOCUMENTED IN MEDICAL RECORD: ICD-10-PCS | Mod: CPTII,S$GLB,, | Performed by: NURSE PRACTITIONER

## 2023-02-01 PROCEDURE — 3079F DIAST BP 80-89 MM HG: CPT | Mod: CPTII,S$GLB,, | Performed by: NURSE PRACTITIONER

## 2023-02-01 PROCEDURE — 87651 POCT STREP A MOLECULAR: ICD-10-PCS | Mod: QW,S$GLB,, | Performed by: NURSE PRACTITIONER

## 2023-02-01 PROCEDURE — 1160F PR REVIEW ALL MEDS BY PRESCRIBER/CLIN PHARMACIST DOCUMENTED: ICD-10-PCS | Mod: CPTII,S$GLB,, | Performed by: NURSE PRACTITIONER

## 2023-02-01 PROCEDURE — 3079F PR MOST RECENT DIASTOLIC BLOOD PRESSURE 80-89 MM HG: ICD-10-PCS | Mod: CPTII,S$GLB,, | Performed by: NURSE PRACTITIONER

## 2023-02-01 PROCEDURE — 87651 STREP A DNA AMP PROBE: CPT | Mod: QW,S$GLB,, | Performed by: NURSE PRACTITIONER

## 2023-02-01 PROCEDURE — 1125F AMNT PAIN NOTED PAIN PRSNT: CPT | Mod: CPTII,S$GLB,, | Performed by: NURSE PRACTITIONER

## 2023-02-01 PROCEDURE — 1159F MED LIST DOCD IN RCRD: CPT | Mod: CPTII,S$GLB,, | Performed by: NURSE PRACTITIONER

## 2023-02-01 PROCEDURE — 3008F PR BODY MASS INDEX (BMI) DOCUMENTED: ICD-10-PCS | Mod: CPTII,S$GLB,, | Performed by: NURSE PRACTITIONER

## 2023-02-01 PROCEDURE — 99213 OFFICE O/P EST LOW 20 MIN: CPT | Mod: S$GLB,,, | Performed by: NURSE PRACTITIONER

## 2023-02-01 PROCEDURE — 87811 SARS CORONAVIRUS 2 ANTIGEN POCT, MANUAL READ: ICD-10-PCS | Mod: QW,S$GLB,, | Performed by: NURSE PRACTITIONER

## 2023-02-01 RX ORDER — PREDNISONE 20 MG/1
20 TABLET ORAL DAILY
Qty: 3 TABLET | Refills: 0 | Status: SHIPPED | OUTPATIENT
Start: 2023-02-01 | End: 2023-02-04

## 2023-02-01 RX ORDER — FLUTICASONE PROPIONATE 50 MCG
1 SPRAY, SUSPENSION (ML) NASAL DAILY PRN
Qty: 16 G | Refills: 0 | Status: SHIPPED | OUTPATIENT
Start: 2023-02-01 | End: 2023-03-09

## 2023-02-01 RX ORDER — AZELASTINE 1 MG/ML
1 SPRAY, METERED NASAL 2 TIMES DAILY PRN
Qty: 30 ML | Refills: 0 | Status: SHIPPED | OUTPATIENT
Start: 2023-02-01 | End: 2023-03-09

## 2023-02-01 NOTE — PATIENT INSTRUCTIONS
INSTRUCTIONS:  - Rest.  - Drink plenty of fluids.  - Take Tylenol and/or Ibuprofen as directed as needed for fever/pain.  Do not take more than the recommended dose.  - follow up with your PCP within the next 1-2 weeks as needed.  - You must understand that you have received an Urgent Care treatment only and that you may be released before all of your medical problems are known or treated.   - You, the patient, will arrange for follow up care as instructed.   - If your condition worsens or fails to improve we recommend that you receive another evaluation at the ER immediately or contact your PCP to discuss your concerns.   - You can call [phone] or [phone] to help schedule an appointment with the appropriate provider.     -If you smoke cigarettes, it would be beneficial for you to stop.    OVER THE COUNTER RECOMMENDATIONS/SUGGESTIONS.     Make sure to stay well hydrated.     Use Nasal Saline to mechanically move any post nasal drip from your eustachian tube or from the back of your throat.     Use warm salt water gargles to ease your throat pain. Warm salt water gargles as needed for sore throat-  1/2 tsp salt to 1 cup warm water, gargle as desired.     Use an antihistamine such as Claritin, Zyrtec or Allegra to dry you out.      Use pseudoephedrine (behind the counter) to decongest. Pseudoephedrine  30 mg up to 240 mg /day. It can raise your blood pressure and give you palpitations.     Use mucinex (guaifenisin) to break up mucous up to 2400mg/day to loosen any mucous.   The mucinex DM pill has a cough suppressant that can be sedating. It can be used at night to stop the tickle at the back of your throat.  You can use Mucinex D (it has guaifenesin and a high dose of pseudoephedrine) in the mornings to help decongest.        Use Afrin in each nare for no longer than 3 days, as it is addictive. It can also dry out your mucous membranes and cause elevated blood pressure. This is especially useful if you  are flying.     Use Flonase 1-2 sprays/nostril per day. It is a local acting steroid nasal spray, if you develop a bloody nose, stop using the medication immediately.     Sometimes Nyquil at night is beneficial to help you get some rest, however it is sedating and it does have an antihistamine, and tylenol.     Honey is a natural cough suppressant that can be used.     Tylenol up to 4,000 mg a day is safe for short periods and can be used for body aches, pain, and fever. However in high doses and prolonged use it can cause liver irritation.     Ibuprofen is a non-steroidal anti-inflammatory that can be used for body aches, pain, and fever.However it can also cause stomach irritation if over used.

## 2023-02-01 NOTE — PROGRESS NOTES
"Subjective:       Patient ID: Bhavani Hill is a 67 y.o. female.    Vitals:  height is 5' 4" (1.626 m) and weight is 75.3 kg (166 lb). Her temperature is 98.4 °F (36.9 °C). Her blood pressure is 138/87 and her pulse is 62. Her oxygen saturation is 99%.     Chief Complaint: Sore Throat    Patient states she has sinusitis  Headaches, ear congestion, sore throat with white spots that started approximately 5-6 days ago   Patient has been using salt water gargles with no relief   Patient states she had fever yesterday, 99.1    Provider note begins below:  Patient denies fever, chills, cp, sob, N/V or abdominal pain. Patient reports that her sinus sx worsened after working in the yard. Denies sick contacts. Awake and alert. Afebrile.    Other  This is a new problem. The current episode started in the past 7 days. The problem occurs constantly. The problem has been gradually worsening. Associated symptoms include congestion, headaches and a sore throat (burning sensation.). Pertinent negatives include no abdominal pain, anorexia, arthralgias, change in bowel habit, chest pain, chills, coughing, diaphoresis, fatigue, fever, joint swelling, myalgias, nausea, neck pain, numbness, rash, swollen glands, urinary symptoms, vertigo, visual change, vomiting or weakness. Treatments tried: salt water gargle. The treatment provided no relief.     Constitution: Negative. Negative for chills, sweating, fatigue and fever.   HENT:  Positive for congestion, sinus pain, sinus pressure and sore throat (burning sensation.). Negative for ear pain and facial swelling.    Neck: Negative for neck pain and painful lymph nodes.   Cardiovascular: Negative.  Negative for chest trauma, chest pain and sob on exertion.   Eyes: Negative.  Negative for eye itching and eye pain.   Respiratory: Negative.  Negative for chest tightness, cough and asthma.    Gastrointestinal: Negative.  Negative for abdominal pain, nausea, vomiting and diarrhea.   Endocrine: " negative. cold intolerance and excessive thirst.   Genitourinary: Negative.  Negative for dysuria, frequency, urgency and hematuria.   Musculoskeletal:  Negative for pain, trauma, joint pain, joint swelling and muscle ache.   Skin: Negative.  Negative for rash, wound and hives.   Allergic/Immunologic: Negative.  Negative for eczema, asthma, hives and itching.   Neurological:  Positive for headaches. Negative for history of vertigo, disorientation, altered mental status and numbness.   Hematologic/Lymphatic: Negative.  Negative for swollen lymph nodes.   Psychiatric/Behavioral: Negative.  Negative for altered mental status, disorientation and confusion.      Objective:      Physical Exam   Constitutional: She is oriented to person, place, and time. She appears well-developed. She is cooperative.  Non-toxic appearance. She does not appear ill. No distress.   HENT:   Head: Normocephalic and atraumatic.   Ears:   Right Ear: Hearing, tympanic membrane, external ear and ear canal normal. impacted cerumen  Left Ear: Hearing, tympanic membrane, external ear and ear canal normal. impacted cerumen  Nose: Mucosal edema, rhinorrhea and congestion present. No nasal deformity. No epistaxis. Right sinus exhibits no maxillary sinus tenderness and no frontal sinus tenderness. Left sinus exhibits no maxillary sinus tenderness and no frontal sinus tenderness.   Mouth/Throat: Uvula is midline and mucous membranes are normal. No trismus in the jaw. Normal dentition. No uvula swelling. Posterior oropharyngeal erythema present. No oropharyngeal exudate, posterior oropharyngeal edema or tonsillar abscesses. Tonsils are 0 on the right. Tonsils are 0 on the left.   Eyes: Conjunctivae and lids are normal. No scleral icterus.   Neck: Trachea normal and phonation normal. Neck supple. No edema present. No erythema present. No neck rigidity present.   Cardiovascular: Normal rate, regular rhythm, normal heart sounds and normal pulses.    Pulmonary/Chest: Effort normal and breath sounds normal. No stridor. No respiratory distress. She has no decreased breath sounds. She has no wheezes. She has no rhonchi. She has no rales. She exhibits no tenderness.   Abdominal: Normal appearance. She exhibits no distension. Soft. flat abdomen There is no abdominal tenderness. There is no guarding, no left CVA tenderness and no right CVA tenderness.   Musculoskeletal: Normal range of motion.         General: No deformity. Normal range of motion.   Lymphadenopathy:     She has no cervical adenopathy.   Neurological: no focal deficit. She is alert and oriented to person, place, and time. She exhibits normal muscle tone. Coordination normal.   Skin: Skin is warm, dry, intact, not diaphoretic and not pale.   Psychiatric: Her speech is normal and behavior is normal. Mood, judgment and thought content normal.   Nursing note and vitals reviewed.  The following results have been reviewed with the patient:  LABS-  Results for orders placed or performed in visit on 02/01/23   SARS Coronavirus 2 Antigen, POCT Manual Read   Result Value Ref Range    SARS Coronavirus 2 Antigen Negative Negative     Acceptable Yes    POCT Influenza A/B MOLECULAR   Result Value Ref Range    POC Molecular Influenza A Ag Negative Negative, Not Reported    POC Molecular Influenza B Ag Negative Negative, Not Reported     Acceptable Yes    POCT Strep A, Molecular   Result Value Ref Range    Molecular Strep A, POC Negative Negative     Acceptable Yes         IMAGING-  No results found.      Assessment:       1. Viral URI    2. Sore throat          Plan:       FOLLOWUP  Follow up if symptoms worsen or fail to improve, for PLEASE CONTACT PCP OR CONTACT THE EMERGENCY ROOM..     PATIENT INSTRUCTIONS  Patient Instructions   INSTRUCTIONS:  - Rest.  - Drink plenty of fluids.  - Take Tylenol and/or Ibuprofen as directed as needed for fever/pain.  Do not take more  than the recommended dose.  - follow up with your PCP within the next 1-2 weeks as needed.  - You must understand that you have received an Urgent Care treatment only and that you may be released before all of your medical problems are known or treated.   - You, the patient, will arrange for follow up care as instructed.   - If your condition worsens or fails to improve we recommend that you receive another evaluation at the ER immediately or contact your PCP to discuss your concerns.   - You can call [phone] or [phone] to help schedule an appointment with the appropriate provider.     -If you smoke cigarettes, it would be beneficial for you to stop.    OVER THE COUNTER RECOMMENDATIONS/SUGGESTIONS.     Make sure to stay well hydrated.     Use Nasal Saline to mechanically move any post nasal drip from your eustachian tube or from the back of your throat.     Use warm salt water gargles to ease your throat pain. Warm salt water gargles as needed for sore throat-  1/2 tsp salt to 1 cup warm water, gargle as desired.     Use an antihistamine such as Claritin, Zyrtec or Allegra to dry you out.      Use pseudoephedrine (behind the counter) to decongest. Pseudoephedrine  30 mg up to 240 mg /day. It can raise your blood pressure and give you palpitations.     Use mucinex (guaifenisin) to break up mucous up to 2400mg/day to loosen any mucous.   The mucinex DM pill has a cough suppressant that can be sedating. It can be used at night to stop the tickle at the back of your throat.  You can use Mucinex D (it has guaifenesin and a high dose of pseudoephedrine) in the mornings to help decongest.        Use Afrin in each nare for no longer than 3 days, as it is addictive. It can also dry out your mucous membranes and cause elevated blood pressure. This is especially useful if you are flying.     Use Flonase 1-2 sprays/nostril per day. It is a local acting steroid nasal spray, if you develop a bloody nose, stop using  the medication immediately.     Sometimes Nyquil at night is beneficial to help you get some rest, however it is sedating and it does have an antihistamine, and tylenol.     Honey is a natural cough suppressant that can be used.     Tylenol up to 4,000 mg a day is safe for short periods and can be used for body aches, pain, and fever. However in high doses and prolonged use it can cause liver irritation.     Ibuprofen is a non-steroidal anti-inflammatory that can be used for body aches, pain, and fever.However it can also cause stomach irritation if over used.         THANK YOU FOR ALLOWING ME TO PARTICIPATE IN YOUR HEALTHCARE,     Fitz An, NP   Viral URI  -     fluticasone propionate (FLONASE) 50 mcg/actuation nasal spray; 1 spray (50 mcg total) by Each Nostril route daily as needed for Rhinitis.  Dispense: 16 g; Refill: 0  -     azelastine (ASTELIN) 137 mcg (0.1 %) nasal spray; 1 spray (137 mcg total) by Nasal route 2 (two) times daily as needed for Rhinitis.  Dispense: 30 mL; Refill: 0    Sore throat  -     SARS Coronavirus 2 Antigen, POCT Manual Read  -     POCT Influenza A/B MOLECULAR  -     POCT Strep A, Molecular  -     predniSONE (DELTASONE) 20 MG tablet; Take 1 tablet (20 mg total) by mouth once daily. for 3 days  Dispense: 3 tablet; Refill: 0  -     (Magic mouthwash) 1:1:1 diphenhydrAMINE(Benadryl) 12.5mg/5ml liq, aluminum & magnesium hydroxide-simethicone (Maalox), LIDOcaine viscous 2%; Swish and spit 5 mLs every 4 (four) hours as needed (sore throat). for mouth sores  Dispense: 90 mL; Refill: 0

## 2023-02-23 ENCOUNTER — PES CALL (OUTPATIENT)
Dept: ADMINISTRATIVE | Facility: CLINIC | Age: 68
End: 2023-02-23
Payer: MEDICARE

## 2023-02-24 ENCOUNTER — HOSPITAL ENCOUNTER (OUTPATIENT)
Dept: RADIOLOGY | Facility: HOSPITAL | Age: 68
Discharge: HOME | End: 2023-02-24
Attending: FAMILY MEDICINE
Payer: MEDICARE

## 2023-02-24 DIAGNOSIS — Z12.31 ENCOUNTER FOR SCREENING MAMMOGRAM FOR MALIGNANT NEOPLASM OF BREAST: ICD-10-CM

## 2023-02-24 PROCEDURE — 77067 MAMMO DIGITAL SCREENING BILAT WITH TOMO: ICD-10-PCS | Mod: 26,,, | Performed by: RADIOLOGY

## 2023-02-24 PROCEDURE — 77067 SCR MAMMO BI INCL CAD: CPT | Mod: TC,PO

## 2023-02-24 PROCEDURE — 77067 SCR MAMMO BI INCL CAD: CPT | Mod: 26,,, | Performed by: RADIOLOGY

## 2023-02-24 PROCEDURE — 77063 BREAST TOMOSYNTHESIS BI: CPT | Mod: 26,,, | Performed by: RADIOLOGY

## 2023-02-24 PROCEDURE — 77063 MAMMO DIGITAL SCREENING BILAT WITH TOMO: ICD-10-PCS | Mod: 26,,, | Performed by: RADIOLOGY

## 2023-03-09 ENCOUNTER — OFFICE VISIT (OUTPATIENT)
Dept: PRIMARY CARE CLINIC | Facility: CLINIC | Age: 68
End: 2023-03-09
Payer: MEDICARE

## 2023-03-09 VITALS
WEIGHT: 164 LBS | HEIGHT: 64 IN | SYSTOLIC BLOOD PRESSURE: 118 MMHG | RESPIRATION RATE: 18 BRPM | BODY MASS INDEX: 28 KG/M2 | HEART RATE: 70 BPM | DIASTOLIC BLOOD PRESSURE: 84 MMHG | OXYGEN SATURATION: 96 %

## 2023-03-09 DIAGNOSIS — I10 ESSENTIAL HYPERTENSION: Primary | ICD-10-CM

## 2023-03-09 DIAGNOSIS — Z12.11 COLON CANCER SCREENING: ICD-10-CM

## 2023-03-09 DIAGNOSIS — G47.00 INSOMNIA, UNSPECIFIED TYPE: ICD-10-CM

## 2023-03-09 DIAGNOSIS — E03.9 ACQUIRED HYPOTHYROIDISM: ICD-10-CM

## 2023-03-09 DIAGNOSIS — F41.9 ANXIETY: ICD-10-CM

## 2023-03-09 PROCEDURE — 3074F SYST BP LT 130 MM HG: CPT | Mod: CPTII,S$GLB,, | Performed by: FAMILY MEDICINE

## 2023-03-09 PROCEDURE — 99215 OFFICE O/P EST HI 40 MIN: CPT | Mod: S$GLB,,, | Performed by: FAMILY MEDICINE

## 2023-03-09 PROCEDURE — 1160F PR REVIEW ALL MEDS BY PRESCRIBER/CLIN PHARMACIST DOCUMENTED: ICD-10-PCS | Mod: CPTII,S$GLB,, | Performed by: FAMILY MEDICINE

## 2023-03-09 PROCEDURE — 4010F ACE/ARB THERAPY RXD/TAKEN: CPT | Mod: CPTII,S$GLB,, | Performed by: FAMILY MEDICINE

## 2023-03-09 PROCEDURE — 1159F PR MEDICATION LIST DOCUMENTED IN MEDICAL RECORD: ICD-10-PCS | Mod: CPTII,S$GLB,, | Performed by: FAMILY MEDICINE

## 2023-03-09 PROCEDURE — 3079F PR MOST RECENT DIASTOLIC BLOOD PRESSURE 80-89 MM HG: ICD-10-PCS | Mod: CPTII,S$GLB,, | Performed by: FAMILY MEDICINE

## 2023-03-09 PROCEDURE — 4010F PR ACE/ARB THEARPY RXD/TAKEN: ICD-10-PCS | Mod: CPTII,S$GLB,, | Performed by: FAMILY MEDICINE

## 2023-03-09 PROCEDURE — 99999 PR PBB SHADOW E&M-EST. PATIENT-LVL IV: CPT | Mod: PBBFAC,,, | Performed by: FAMILY MEDICINE

## 2023-03-09 PROCEDURE — 3288F PR FALLS RISK ASSESSMENT DOCUMENTED: ICD-10-PCS | Mod: CPTII,S$GLB,, | Performed by: FAMILY MEDICINE

## 2023-03-09 PROCEDURE — 3288F FALL RISK ASSESSMENT DOCD: CPT | Mod: CPTII,S$GLB,, | Performed by: FAMILY MEDICINE

## 2023-03-09 PROCEDURE — 3079F DIAST BP 80-89 MM HG: CPT | Mod: CPTII,S$GLB,, | Performed by: FAMILY MEDICINE

## 2023-03-09 PROCEDURE — 1126F PR PAIN SEVERITY QUANTIFIED, NO PAIN PRESENT: ICD-10-PCS | Mod: CPTII,S$GLB,, | Performed by: FAMILY MEDICINE

## 2023-03-09 PROCEDURE — 1101F PR PT FALLS ASSESS DOC 0-1 FALLS W/OUT INJ PAST YR: ICD-10-PCS | Mod: CPTII,S$GLB,, | Performed by: FAMILY MEDICINE

## 2023-03-09 PROCEDURE — 1159F MED LIST DOCD IN RCRD: CPT | Mod: CPTII,S$GLB,, | Performed by: FAMILY MEDICINE

## 2023-03-09 PROCEDURE — 3074F PR MOST RECENT SYSTOLIC BLOOD PRESSURE < 130 MM HG: ICD-10-PCS | Mod: CPTII,S$GLB,, | Performed by: FAMILY MEDICINE

## 2023-03-09 PROCEDURE — 99215 PR OFFICE/OUTPT VISIT, EST, LEVL V, 40-54 MIN: ICD-10-PCS | Mod: S$GLB,,, | Performed by: FAMILY MEDICINE

## 2023-03-09 PROCEDURE — 1126F AMNT PAIN NOTED NONE PRSNT: CPT | Mod: CPTII,S$GLB,, | Performed by: FAMILY MEDICINE

## 2023-03-09 PROCEDURE — 1101F PT FALLS ASSESS-DOCD LE1/YR: CPT | Mod: CPTII,S$GLB,, | Performed by: FAMILY MEDICINE

## 2023-03-09 PROCEDURE — 3008F PR BODY MASS INDEX (BMI) DOCUMENTED: ICD-10-PCS | Mod: CPTII,S$GLB,, | Performed by: FAMILY MEDICINE

## 2023-03-09 PROCEDURE — 99999 PR PBB SHADOW E&M-EST. PATIENT-LVL IV: ICD-10-PCS | Mod: PBBFAC,,, | Performed by: FAMILY MEDICINE

## 2023-03-09 PROCEDURE — 1160F RVW MEDS BY RX/DR IN RCRD: CPT | Mod: CPTII,S$GLB,, | Performed by: FAMILY MEDICINE

## 2023-03-09 PROCEDURE — 3008F BODY MASS INDEX DOCD: CPT | Mod: CPTII,S$GLB,, | Performed by: FAMILY MEDICINE

## 2023-03-09 RX ORDER — LEVOTHYROXINE SODIUM 50 UG/1
50 TABLET ORAL DAILY
Qty: 90 TABLET | Refills: 3 | Status: SHIPPED | OUTPATIENT
Start: 2023-03-09

## 2023-03-09 RX ORDER — LISINOPRIL AND HYDROCHLOROTHIAZIDE 12.5; 2 MG/1; MG/1
2 TABLET ORAL DAILY
Qty: 180 TABLET | Refills: 3 | Status: SHIPPED | OUTPATIENT
Start: 2023-03-09

## 2023-03-09 RX ORDER — METOPROLOL SUCCINATE 25 MG/1
25 TABLET, EXTENDED RELEASE ORAL DAILY
Qty: 90 TABLET | Refills: 3 | Status: SHIPPED | OUTPATIENT
Start: 2023-03-09

## 2023-03-09 NOTE — ASSESSMENT & PLAN NOTE
Intermittent insomnia.  She did not do well with trazodone.  Inquired about Ambien.  I would recommend avoiding this unless absolutely necessary.  Discussed limiting caffeine after the morning time, regular exercise, good sleep hygiene.  Consider trying melatonin again she only tried this once or twice.  Also could consider cognitive behavioral therapy if not improving.  But it does not sound like this is a nightly problem just intermittent

## 2023-03-09 NOTE — PROGRESS NOTES
THIS DOCUMENT WAS MADE IN PART WITH VOICE RECOGNITION SOFTWARE.  OCCASIONALLY THIS SOFTWARE WILL MISINTERPRET WORDS OR PHRASES.      Primary Care Provider Appointment   [facility] Morris Vazquez       Patient ID: Bhavani Hill is a 67 y.o. female.    ASSESSMENT/PLAN by Problem List:  Problem List Items Addressed This Visit       Essential hypertension - Primary (Chronic)     Blood pressure appears stable and satisfactory.  Continue current medications         Relevant Medications    lisinopriL-hydrochlorothiazide (PRINZIDE,ZESTORETIC) 20-12.5 mg per tablet    metoprolol succinate (TOPROL-XL) 25 MG 24 hr tablet    Other Relevant Orders    Comprehensive Metabolic Panel    CBC Auto Differential    Lipid Panel    Hypothyroidism (Chronic)     Patient apparently has been stable on 50 mcg levothyroxine for many years.  Continue and check labs.         Relevant Medications    levothyroxine (SYNTHROID) 50 MCG tablet    Other Relevant Orders    TSH    Insomnia (Chronic)     Intermittent insomnia.  She did not do well with trazodone.  Inquired about Ambien.  I would recommend avoiding this unless absolutely necessary.  Discussed limiting caffeine after the morning time, regular exercise, good sleep hygiene.  Consider trying melatonin again she only tried this once or twice.  Also could consider cognitive behavioral therapy if not improving.  But it does not sound like this is a nightly problem just intermittent         Anxiety     Longstanding.  Does have severe anxiety when a passenger in a car.  Does use alprazalam for this.         Colon cancer screening    Relevant Orders    Case Request Endoscopy: COLONOSCOPY (Completed)       Follow Up:  Six months  Total time 50 minutes  Subjective:     Chief Complaint   Patient presents with    Establish Care     I have reviewed the information entered by the ancillary staff regarding the chief complaint as well as the related history.    HPI    Patient is a/an 67  y.o.  female   Establish care.  We met before when she was in with her .  Hypertension appears stable, hypothyroidism also appears stable.  She does have some questions about anxiety and insomnia.  Insomnia, intermittent, bad response to trazodone.  Discussed ambien, hold off as this is generally not recommended as first-line treatment      For complete problem list, past medical history, surgical history, social history, etc., see appropriate section in the electronic medical record    Review of Systems   Constitutional:  Negative for activity change and unexpected weight change.   HENT:  Negative for hearing loss, rhinorrhea and trouble swallowing.    Eyes:  Negative for discharge and visual disturbance.   Respiratory:  Negative for chest tightness and wheezing.    Cardiovascular:  Negative for chest pain and palpitations.   Gastrointestinal:  Negative for blood in stool, constipation, diarrhea and vomiting.   Endocrine: Negative for polydipsia and polyuria.   Genitourinary:  Negative for difficulty urinating, dysuria, hematuria and menstrual problem.   Musculoskeletal:  Negative for arthralgias, joint swelling and neck pain.   Neurological:  Negative for weakness and headaches.   Psychiatric/Behavioral:  Negative for confusion and dysphoric mood.      Objective     Physical Exam  Vitals reviewed.   Constitutional:       General: She is not in acute distress.     Appearance: She is well-developed. She is not toxic-appearing.   HENT:      Head: Normocephalic and atraumatic.      Right Ear: Tympanic membrane, ear canal and external ear normal. There is no impacted cerumen.      Left Ear: Tympanic membrane, ear canal and external ear normal. There is no impacted cerumen.      Nose: Nose normal.      Mouth/Throat:      Pharynx: Oropharynx is clear. No oropharyngeal exudate.   Eyes:      General: No scleral icterus.     Conjunctiva/sclera: Conjunctivae normal.      Pupils: Pupils are equal, round, and reactive  "to light.   Neck:      Thyroid: No thyromegaly.      Vascular: No JVD.      Trachea: No tracheal deviation.   Cardiovascular:      Rate and Rhythm: Normal rate and regular rhythm.      Chest Wall: PMI is not displaced.      Heart sounds: Normal heart sounds. No murmur heard.  Pulmonary:      Effort: No respiratory distress.      Breath sounds: Normal breath sounds. No wheezing or rales.   Abdominal:      Palpations: Abdomen is soft.   Musculoskeletal:      Cervical back: Normal range of motion and neck supple.   Lymphadenopathy:      Cervical: No cervical adenopathy.   Skin:     Coloration: Skin is not jaundiced or pale.   Neurological:      Mental Status: She is alert and oriented to person, place, and time.      Cranial Nerves: No cranial nerve deficit.      Motor: No abnormal muscle tone.      Deep Tendon Reflexes: Reflexes normal.   Psychiatric:         Behavior: Behavior normal.     Vitals:    03/09/23 1246   BP: 118/84   BP Location: Left arm   Patient Position: Sitting   BP Method: Medium (Manual)   Pulse: 70   Resp: 18   SpO2: 96%   Weight: 74.4 kg (164 lb 0.4 oz)   Height: 5' 4" (1.626 m)       RECENT LABS:    Lab Results   Component Value Date    WBC 4.52 05/19/2022    HGB 12.7 05/19/2022    HCT 39.9 05/19/2022     05/19/2022    CHOL 210 (H) 05/19/2022    TRIG 155 (H) 05/19/2022    HDL 80 (H) 05/19/2022    ALT 20 05/19/2022    AST 28 05/19/2022     05/19/2022    K 4.2 05/19/2022     05/19/2022    CREATININE 0.8 05/19/2022    BUN 11 05/19/2022    CO2 25 05/19/2022    TSH 2.100 05/19/2022    GLUF 91 12/03/2016    HGBA1C 5.1 05/19/2022       Results for orders placed or performed in visit on 02/01/23   SARS Coronavirus 2 Antigen, POCT Manual Read   Result Value Ref Range    SARS Coronavirus 2 Antigen Negative Negative     Acceptable Yes    POCT Influenza A/B MOLECULAR   Result Value Ref Range    POC Molecular Influenza A Ag Negative Negative, Not Reported    POC Molecular " Influenza B Ag Negative Negative, Not Reported     Acceptable Yes    POCT Strep A, Molecular   Result Value Ref Range    Molecular Strep A, POC Negative Negative     Acceptable Yes

## 2023-03-09 NOTE — ASSESSMENT & PLAN NOTE
Patient apparently has been stable on 50 mcg levothyroxine for many years.  Continue and check labs.

## 2023-03-14 ENCOUNTER — LAB VISIT (OUTPATIENT)
Dept: LAB | Facility: HOSPITAL | Age: 68
End: 2023-03-14
Attending: FAMILY MEDICINE
Payer: MEDICARE

## 2023-03-14 DIAGNOSIS — E03.9 ACQUIRED HYPOTHYROIDISM: ICD-10-CM

## 2023-03-14 DIAGNOSIS — I10 ESSENTIAL HYPERTENSION: ICD-10-CM

## 2023-03-14 LAB
BASOPHILS # BLD AUTO: 0.05 K/UL (ref 0–0.2)
BASOPHILS NFR BLD: 0.8 % (ref 0–1.9)
DIFFERENTIAL METHOD: ABNORMAL
EOSINOPHIL # BLD AUTO: 0.1 K/UL (ref 0–0.5)
EOSINOPHIL NFR BLD: 2.2 % (ref 0–8)
ERYTHROCYTE [DISTWIDTH] IN BLOOD BY AUTOMATED COUNT: 14.9 % (ref 11.5–14.5)
HCT VFR BLD AUTO: 36.4 % (ref 37–48.5)
HGB BLD-MCNC: 11.4 G/DL (ref 12–16)
IMM GRANULOCYTES # BLD AUTO: 0.01 K/UL (ref 0–0.04)
IMM GRANULOCYTES NFR BLD AUTO: 0.2 % (ref 0–0.5)
LYMPHOCYTES # BLD AUTO: 2 K/UL (ref 1–4.8)
LYMPHOCYTES NFR BLD: 33.7 % (ref 18–48)
MCH RBC QN AUTO: 26.5 PG (ref 27–31)
MCHC RBC AUTO-ENTMCNC: 31.3 G/DL (ref 32–36)
MCV RBC AUTO: 85 FL (ref 82–98)
MONOCYTES # BLD AUTO: 0.7 K/UL (ref 0.3–1)
MONOCYTES NFR BLD: 10.8 % (ref 4–15)
NEUTROPHILS # BLD AUTO: 3.1 K/UL (ref 1.8–7.7)
NEUTROPHILS NFR BLD: 52.3 % (ref 38–73)
NRBC BLD-RTO: 0 /100 WBC
PLATELET # BLD AUTO: 234 K/UL (ref 150–450)
PMV BLD AUTO: 12.4 FL (ref 9.2–12.9)
RBC # BLD AUTO: 4.3 M/UL (ref 4–5.4)
WBC # BLD AUTO: 6 K/UL (ref 3.9–12.7)

## 2023-03-14 PROCEDURE — 84443 ASSAY THYROID STIM HORMONE: CPT | Performed by: FAMILY MEDICINE

## 2023-03-14 PROCEDURE — 85025 COMPLETE CBC W/AUTO DIFF WBC: CPT | Performed by: FAMILY MEDICINE

## 2023-03-14 PROCEDURE — 36415 COLL VENOUS BLD VENIPUNCTURE: CPT | Mod: PO | Performed by: FAMILY MEDICINE

## 2023-03-14 PROCEDURE — 80061 LIPID PANEL: CPT | Performed by: FAMILY MEDICINE

## 2023-03-14 PROCEDURE — 84439 ASSAY OF FREE THYROXINE: CPT | Performed by: FAMILY MEDICINE

## 2023-03-14 PROCEDURE — 80053 COMPREHEN METABOLIC PANEL: CPT | Performed by: FAMILY MEDICINE

## 2023-03-15 ENCOUNTER — TELEPHONE (OUTPATIENT)
Dept: PRIMARY CARE CLINIC | Facility: CLINIC | Age: 68
End: 2023-03-15
Payer: MEDICARE

## 2023-03-15 DIAGNOSIS — D64.9 ANEMIA, UNSPECIFIED TYPE: ICD-10-CM

## 2023-03-15 DIAGNOSIS — E03.9 ACQUIRED HYPOTHYROIDISM: Primary | ICD-10-CM

## 2023-03-15 LAB
ALBUMIN SERPL BCP-MCNC: 3.3 G/DL (ref 3.5–5.2)
ALP SERPL-CCNC: 65 U/L (ref 55–135)
ALT SERPL W/O P-5'-P-CCNC: 17 U/L (ref 10–44)
ANION GAP SERPL CALC-SCNC: 4 MMOL/L (ref 8–16)
AST SERPL-CCNC: 25 U/L (ref 10–40)
BILIRUB SERPL-MCNC: 0.5 MG/DL (ref 0.1–1)
BUN SERPL-MCNC: 14 MG/DL (ref 8–23)
CALCIUM SERPL-MCNC: 9.1 MG/DL (ref 8.7–10.5)
CHLORIDE SERPL-SCNC: 106 MMOL/L (ref 95–110)
CHOLEST SERPL-MCNC: 187 MG/DL (ref 120–199)
CHOLEST/HDLC SERPL: 2.8 {RATIO} (ref 2–5)
CO2 SERPL-SCNC: 28 MMOL/L (ref 23–29)
CREAT SERPL-MCNC: 0.8 MG/DL (ref 0.5–1.4)
EST. GFR  (NO RACE VARIABLE): >60 ML/MIN/1.73 M^2
GLUCOSE SERPL-MCNC: 90 MG/DL (ref 70–110)
HDLC SERPL-MCNC: 68 MG/DL (ref 40–75)
HDLC SERPL: 36.4 % (ref 20–50)
LDLC SERPL CALC-MCNC: 103 MG/DL (ref 63–159)
NONHDLC SERPL-MCNC: 119 MG/DL
POTASSIUM SERPL-SCNC: 5.1 MMOL/L (ref 3.5–5.1)
PROT SERPL-MCNC: 6 G/DL (ref 6–8.4)
SODIUM SERPL-SCNC: 138 MMOL/L (ref 136–145)
T4 FREE SERPL-MCNC: 0.91 NG/DL (ref 0.71–1.51)
TRIGL SERPL-MCNC: 80 MG/DL (ref 30–150)
TSH SERPL DL<=0.005 MIU/L-ACNC: 4.02 UIU/ML (ref 0.4–4)

## 2023-03-15 NOTE — TELEPHONE ENCOUNTER
Spoke with pt, discussed your message, and got pt scheduled for 2 separate lab encounters. I haphazardly asked pt if she had recently started a new iron supplement in conversation since she was to be scheduled 3 wks out and now she wants to make sure that you didn't want her to start one.     Please advise.

## 2023-03-15 NOTE — TELEPHONE ENCOUNTER
No I would not recommend starting iron supplements.  We do not know if that is the cause of her anemia at this time.  Findings are mild.  Further recommendations will follow once her labs are checked

## 2023-03-15 NOTE — TELEPHONE ENCOUNTER
Please call regarding lab results.  I sent a detailed explanation through results review that you can review with the patient if she has not already looked at that.    Please schedule repeat labs.  I would like the anemia evaluation which include CBC, B12, iron, ferritin, and TIBC within a few weeks.  The TSH can wait a few months, and can even wait until right before she follows with me in about 6 months if desired

## 2023-03-19 ENCOUNTER — OFFICE VISIT (OUTPATIENT)
Dept: URGENT CARE | Facility: CLINIC | Age: 68
End: 2023-03-19
Payer: MEDICARE

## 2023-03-19 VITALS
DIASTOLIC BLOOD PRESSURE: 83 MMHG | TEMPERATURE: 99 F | HEART RATE: 73 BPM | BODY MASS INDEX: 28 KG/M2 | HEIGHT: 64 IN | OXYGEN SATURATION: 96 % | RESPIRATION RATE: 16 BRPM | SYSTOLIC BLOOD PRESSURE: 131 MMHG | WEIGHT: 164 LBS

## 2023-03-19 DIAGNOSIS — N39.0 URINARY TRACT INFECTION WITH HEMATURIA, SITE UNSPECIFIED: ICD-10-CM

## 2023-03-19 DIAGNOSIS — R30.0 DYSURIA: Primary | ICD-10-CM

## 2023-03-19 DIAGNOSIS — R31.9 URINARY TRACT INFECTION WITH HEMATURIA, SITE UNSPECIFIED: ICD-10-CM

## 2023-03-19 DIAGNOSIS — R19.7 DIARRHEA, UNSPECIFIED TYPE: ICD-10-CM

## 2023-03-19 DIAGNOSIS — R31.9 HEMATURIA, UNSPECIFIED TYPE: ICD-10-CM

## 2023-03-19 LAB
BILIRUB UR QL STRIP: POSITIVE
GLUCOSE UR QL STRIP: NEGATIVE
KETONES UR QL STRIP: POSITIVE
LEUKOCYTE ESTERASE UR QL STRIP: POSITIVE
PH, POC UA: 5.5 (ref 5–8)
POC BLOOD, URINE: POSITIVE
POC NITRATES, URINE: NEGATIVE
PROT UR QL STRIP: POSITIVE
SP GR UR STRIP: 1.02 (ref 1–1.03)
UROBILINOGEN UR STRIP-ACNC: ABNORMAL (ref 0.1–1.1)

## 2023-03-19 PROCEDURE — 87186 SC STD MICRODIL/AGAR DIL: CPT | Performed by: EMERGENCY MEDICINE

## 2023-03-19 PROCEDURE — 87086 URINE CULTURE/COLONY COUNT: CPT | Performed by: EMERGENCY MEDICINE

## 2023-03-19 PROCEDURE — 81003 POCT URINALYSIS, DIPSTICK, AUTOMATED, W/O SCOPE: ICD-10-PCS | Mod: QW,S$GLB,, | Performed by: EMERGENCY MEDICINE

## 2023-03-19 PROCEDURE — 87077 CULTURE AEROBIC IDENTIFY: CPT | Performed by: EMERGENCY MEDICINE

## 2023-03-19 PROCEDURE — 99213 OFFICE O/P EST LOW 20 MIN: CPT | Mod: S$GLB,,, | Performed by: EMERGENCY MEDICINE

## 2023-03-19 PROCEDURE — 81003 URINALYSIS AUTO W/O SCOPE: CPT | Mod: QW,S$GLB,, | Performed by: EMERGENCY MEDICINE

## 2023-03-19 PROCEDURE — 87088 URINE BACTERIA CULTURE: CPT | Performed by: EMERGENCY MEDICINE

## 2023-03-19 PROCEDURE — 99213 PR OFFICE/OUTPT VISIT, EST, LEVL III, 20-29 MIN: ICD-10-PCS | Mod: S$GLB,,, | Performed by: EMERGENCY MEDICINE

## 2023-03-19 RX ORDER — CEPHALEXIN 500 MG/1
500 CAPSULE ORAL EVERY 8 HOURS
Qty: 15 CAPSULE | Refills: 0 | Status: SHIPPED | OUTPATIENT
Start: 2023-03-19 | End: 2023-03-24

## 2023-03-19 NOTE — LETTER
March 19, 2023      [facility] [place] [address], [address]  [facility] [address]  Phone: [phone]  Fax: [phone]       Patient: Bhavani Hill   YOB: 1955  Date of Visit: 03/19/2023    To Whom It May Concern:    Emmanuel Hill  was at [facility] on 03/19/2023. The patient may return to work/school/ other activities on 3/22/2023 with no restrictions. If you have any questions or concerns, or if I can be of further assistance, please do not hesitate to contact me.    Sincerely,    Francisco Murdock MD

## 2023-03-19 NOTE — PATIENT INSTRUCTIONS
Make sure to follow-up with your doctor in a few weeks for a repeat urine test since you had blood in your urine.

## 2023-03-19 NOTE — PROGRESS NOTES
"Subjective:       Patient ID: Bhavani Hill is a 67 y.o. female.    Vitals:  height is 5' 4" (1.626 m) and weight is 74.4 kg (164 lb). Her oral temperature is 98.5 °F (36.9 °C). Her blood pressure is 131/83 and her pulse is 73. Her respiration is 16 and oxygen saturation is 96%.     Chief Complaint: Urinary Tract Infection    Patient presents today with possible UTI that started about 3 days ago.  Patient is experiencing frequency and burning w/ urination.   Patient states urine is slightly blood tinged.  Patient also complaining of abdominal cramps and watery diarrhea that began this morning.  Patient taking Imodium with some success.    Urinary Tract Infection   This is a new problem. The current episode started in the past 7 days. The quality of the pain is described as burning. Associated symptoms include frequency, hematuria and urgency. Associated symptoms comments: Diarrhea, lower abdomen discomfort. Her past medical history is significant for recurrent UTIs.     Genitourinary:  Positive for frequency, urgency and hematuria.     Objective:      Physical Exam   Constitutional: She is oriented to person, place, and time. She appears well-developed.   HENT:   Head: Normocephalic and atraumatic.   Ears:   Right Ear: External ear normal.   Left Ear: External ear normal.   Nose: Nose normal. No nasal deformity. No epistaxis.   Mouth/Throat: Oropharynx is clear and moist and mucous membranes are normal.   Eyes: Lids are normal.   Neck: Trachea normal and phonation normal. Neck supple.   Cardiovascular: Normal pulses.   Pulmonary/Chest: Effort normal.   Abdominal: Normal appearance and bowel sounds are normal. She exhibits no distension and no mass. Soft. There is no abdominal tenderness. There is no rebound, no guarding, no left CVA tenderness and no right CVA tenderness. No hernia.   Neurological: She is alert and oriented to person, place, and time.   Skin: Skin is warm, dry and intact.   Psychiatric: Her speech is " normal and behavior is normal.   Nursing note and vitals reviewed.      67-year-old female presents complaining of diarrhea that began this morning without blood or mucus.  She also has dysuria.  Urine test is consistent with UTI.  Will treat with cephalexin and have patient continue Imodium for diarrhea.  Since patient has blood in urine should follow-up with primary doctor for recheck of urine in a couple of weeks  Results for orders placed or performed in visit on 03/19/23   POCT Urinalysis, Dipstick, Automated, W/O Scope   Result Value Ref Range    POC Blood, Urine Positive (A) Negative    POC Bilirubin, Urine Positive (A) Negative    POC Urobilinogen, Urine pos 0.1 - 1.1    POC Ketones, Urine Positive (A) Negative    POC Protein, Urine Positive (A) Negative    POC Nitrates, Urine Negative Negative    POC Glucose, Urine Negative Negative    pH, UA 5.5 5 - 8    POC Specific Gravity, Urine 1.020 1.003 - 1.029    POC Leukocytes, Urine Positive (A) Negative         Assessment:       1. Dysuria    2. Diarrhea, unspecified type    3. Urinary tract infection with hematuria, site unspecified    4. Hematuria, unspecified type          Plan:         Dysuria  -     POCT Urinalysis, Dipstick, Automated, W/O Scope    Diarrhea, unspecified type    Urinary tract infection with hematuria, site unspecified  -     CULTURE, URINE  -     cephALEXin (KEFLEX) 500 MG capsule; Take 1 capsule (500 mg total) by mouth every 8 (eight) hours. for 5 days  Dispense: 15 capsule; Refill: 0    Hematuria, unspecified type

## 2023-03-22 LAB — BACTERIA UR CULT: ABNORMAL

## 2023-03-25 ENCOUNTER — TELEPHONE (OUTPATIENT)
Dept: URGENT CARE | Facility: CLINIC | Age: 68
End: 2023-03-25
Payer: MEDICARE

## 2023-03-25 NOTE — TELEPHONE ENCOUNTER
Pt viewed Mychart       ----- Message from Joaquin Holcomb MD sent at 3/22/2023  8:06 AM CDT -----  Please contact the patient and let them know that their results were likley sensitive to abx given. See how feeling and let provider know if pt has any questions

## 2023-03-27 ENCOUNTER — OFFICE VISIT (OUTPATIENT)
Dept: OBSTETRICS AND GYNECOLOGY | Facility: CLINIC | Age: 68
End: 2023-03-27
Attending: OBSTETRICS & GYNECOLOGY
Payer: MEDICARE

## 2023-03-27 VITALS
WEIGHT: 163.13 LBS | BODY MASS INDEX: 27.85 KG/M2 | HEIGHT: 64 IN | SYSTOLIC BLOOD PRESSURE: 140 MMHG | DIASTOLIC BLOOD PRESSURE: 82 MMHG

## 2023-03-27 DIAGNOSIS — Z01.419 ENCOUNTER FOR GYNECOLOGICAL EXAMINATION: Primary | ICD-10-CM

## 2023-03-27 PROCEDURE — 1126F PR PAIN SEVERITY QUANTIFIED, NO PAIN PRESENT: ICD-10-PCS | Mod: CPTII,S$GLB,, | Performed by: OBSTETRICS & GYNECOLOGY

## 2023-03-27 PROCEDURE — 3288F FALL RISK ASSESSMENT DOCD: CPT | Mod: CPTII,S$GLB,, | Performed by: OBSTETRICS & GYNECOLOGY

## 2023-03-27 PROCEDURE — G0101 PR CA SCREEN;PELVIC/BREAST EXAM: ICD-10-PCS | Mod: S$GLB,,, | Performed by: OBSTETRICS & GYNECOLOGY

## 2023-03-27 PROCEDURE — G0101 CA SCREEN;PELVIC/BREAST EXAM: HCPCS | Mod: S$GLB,,, | Performed by: OBSTETRICS & GYNECOLOGY

## 2023-03-27 PROCEDURE — 3288F PR FALLS RISK ASSESSMENT DOCUMENTED: ICD-10-PCS | Mod: CPTII,S$GLB,, | Performed by: OBSTETRICS & GYNECOLOGY

## 2023-03-27 PROCEDURE — 1101F PR PT FALLS ASSESS DOC 0-1 FALLS W/OUT INJ PAST YR: ICD-10-PCS | Mod: CPTII,S$GLB,, | Performed by: OBSTETRICS & GYNECOLOGY

## 2023-03-27 PROCEDURE — 3008F BODY MASS INDEX DOCD: CPT | Mod: CPTII,S$GLB,, | Performed by: OBSTETRICS & GYNECOLOGY

## 2023-03-27 PROCEDURE — 1159F PR MEDICATION LIST DOCUMENTED IN MEDICAL RECORD: ICD-10-PCS | Mod: CPTII,S$GLB,, | Performed by: OBSTETRICS & GYNECOLOGY

## 2023-03-27 PROCEDURE — 3079F DIAST BP 80-89 MM HG: CPT | Mod: CPTII,S$GLB,, | Performed by: OBSTETRICS & GYNECOLOGY

## 2023-03-27 PROCEDURE — 4010F PR ACE/ARB THEARPY RXD/TAKEN: ICD-10-PCS | Mod: CPTII,S$GLB,, | Performed by: OBSTETRICS & GYNECOLOGY

## 2023-03-27 PROCEDURE — 3077F SYST BP >= 140 MM HG: CPT | Mod: CPTII,S$GLB,, | Performed by: OBSTETRICS & GYNECOLOGY

## 2023-03-27 PROCEDURE — 99999 PR PBB SHADOW E&M-EST. PATIENT-LVL III: ICD-10-PCS | Mod: PBBFAC,,, | Performed by: OBSTETRICS & GYNECOLOGY

## 2023-03-27 PROCEDURE — 1101F PT FALLS ASSESS-DOCD LE1/YR: CPT | Mod: CPTII,S$GLB,, | Performed by: OBSTETRICS & GYNECOLOGY

## 2023-03-27 PROCEDURE — 4010F ACE/ARB THERAPY RXD/TAKEN: CPT | Mod: CPTII,S$GLB,, | Performed by: OBSTETRICS & GYNECOLOGY

## 2023-03-27 PROCEDURE — 3008F PR BODY MASS INDEX (BMI) DOCUMENTED: ICD-10-PCS | Mod: CPTII,S$GLB,, | Performed by: OBSTETRICS & GYNECOLOGY

## 2023-03-27 PROCEDURE — 1126F AMNT PAIN NOTED NONE PRSNT: CPT | Mod: CPTII,S$GLB,, | Performed by: OBSTETRICS & GYNECOLOGY

## 2023-03-27 PROCEDURE — 3077F PR MOST RECENT SYSTOLIC BLOOD PRESSURE >= 140 MM HG: ICD-10-PCS | Mod: CPTII,S$GLB,, | Performed by: OBSTETRICS & GYNECOLOGY

## 2023-03-27 PROCEDURE — 99999 PR PBB SHADOW E&M-EST. PATIENT-LVL III: CPT | Mod: PBBFAC,,, | Performed by: OBSTETRICS & GYNECOLOGY

## 2023-03-27 PROCEDURE — 1159F MED LIST DOCD IN RCRD: CPT | Mod: CPTII,S$GLB,, | Performed by: OBSTETRICS & GYNECOLOGY

## 2023-03-27 PROCEDURE — 1160F RVW MEDS BY RX/DR IN RCRD: CPT | Mod: CPTII,S$GLB,, | Performed by: OBSTETRICS & GYNECOLOGY

## 2023-03-27 PROCEDURE — 1160F PR REVIEW ALL MEDS BY PRESCRIBER/CLIN PHARMACIST DOCUMENTED: ICD-10-PCS | Mod: CPTII,S$GLB,, | Performed by: OBSTETRICS & GYNECOLOGY

## 2023-03-27 PROCEDURE — 3079F PR MOST RECENT DIASTOLIC BLOOD PRESSURE 80-89 MM HG: ICD-10-PCS | Mod: CPTII,S$GLB,, | Performed by: OBSTETRICS & GYNECOLOGY

## 2023-03-27 NOTE — PROGRESS NOTES
"Subjective:       Patient ID: Bhavani Hill is a 67 y.o. female.    Chief Complaint:  Annual Exam (Last pap 2019 normal/Last hpv 2019 negative/Last mmg 2023 birads 1)      History of Present Illness  - here for annual. No complaints. Mammogram ordered by PCP.    Past Medical History:   Diagnosis Date    Essential (primary) hypertension     since ~     Family history of breast cancer     Mother at age 58, Sister age 35    H/O bone density study 2015    R hip: 0.20   //   L hip:  0.02   //  spine 2.46    ([facility])     History of cholelithiasis     Hypothyroidism     since     Menopause     Pap smear for cervical cancer screening 2015    Pap/Hpv negative  ([facility])        Past Surgical History:   Procedure Laterality Date     SECTION      x 2    CHOLECYSTECTOMY  2010        Family History   Problem Relation Age of Onset    Breast cancer Mother 58    Cancer Mother         Breast    Breast cancer Sister     Cancer Sister         Breast and ? biliary CA    Colon cancer Neg Hx     Ovarian cancer Neg Hx         Social History     Socioeconomic History    Marital status:      Spouse name: Ray   Occupational History    Occupation: teacher   Tobacco Use    Smoking status: Never    Smokeless tobacco: Never   Substance and Sexual Activity    Alcohol use: Yes     Alcohol/week: 0.0 standard drinks     Comment: 1 martini daily    Drug use: Never    Sexual activity: Yes     Birth control/protection: Post-menopausal           Objective:     Vitals:    23 1407   BP: (!) 140/82   BP Location: Left arm   Patient Position: Sitting   Weight: 74 kg (163 lb 2.3 oz)   Height: 5' 4" (1.626 m)       Physical Exam:   Constitutional: She is oriented to person, place, and time. She appears well-developed and well-nourished.        Pulmonary/Chest: Right breast exhibits no mass, no nipple discharge, no skin change, no tenderness and no swelling. Left breast exhibits no mass, no nipple discharge, no " skin change, no tenderness and no swelling. Breasts are symmetrical.        Abdominal: Soft. She exhibits no distension. There is no abdominal tenderness.     Genitourinary:    Vagina and uterus normal.   There is no tenderness or lesion on the right labia. There is no tenderness or lesion on the left labia. Cervix is normal. Right adnexum displays no mass, no tenderness and no fullness. Left adnexum displays no mass, no tenderness and no fullness. No  no vaginal discharge in the vagina. Uterus is hosting fibroids.           Musculoskeletal: Moves all extremeties.       Neurological: She is alert and oriented to person, place, and time.     Psychiatric: She has a normal mood and affect.      Assessment/ Plan:          Bhavani was seen today for annual exam.    Diagnoses and all orders for this visit:    Encounter for gynecological examination        Follow up in about 1 year (around 3/27/2024) for annual exam.    As of April 1, 2021, the Cures Act has been passed nationally. This new law requires that all doctors progress notes, lab results, pathology reports and radiology reports be released IMMEDIATELY to the patient in the patient portal. That means that the results are released to you at the EXACT same time they are released to me. Therefore, with all of the patients that I have I am not able to reply to each patient exactly when the results come in. So there will be a delay from when you see the results to when I see them and have time to come up with a response to send you. Also I only see these results when I am on the computer at work. So if the results come in over the weekend or after 5 pm of a work day, I will not see them until the next business day. As you can tell, this is a challenge as a physician to give every patient the quick response they hope for and deserve. So please be patient!   Thanks for your understanding and patience.

## 2023-04-03 ENCOUNTER — LAB VISIT (OUTPATIENT)
Dept: LAB | Facility: HOSPITAL | Age: 68
End: 2023-04-03
Attending: FAMILY MEDICINE
Payer: MEDICARE

## 2023-04-03 DIAGNOSIS — D64.9 ANEMIA, UNSPECIFIED TYPE: ICD-10-CM

## 2023-04-03 LAB
BASOPHILS # BLD AUTO: 0.06 K/UL (ref 0–0.2)
BASOPHILS NFR BLD: 0.9 % (ref 0–1.9)
DIFFERENTIAL METHOD: ABNORMAL
EOSINOPHIL # BLD AUTO: 0.1 K/UL (ref 0–0.5)
EOSINOPHIL NFR BLD: 1 % (ref 0–8)
ERYTHROCYTE [DISTWIDTH] IN BLOOD BY AUTOMATED COUNT: 15 % (ref 11.5–14.5)
FERRITIN SERPL-MCNC: 10 NG/ML (ref 20–300)
HCT VFR BLD AUTO: 38.7 % (ref 37–48.5)
HGB BLD-MCNC: 12 G/DL (ref 12–16)
IMM GRANULOCYTES # BLD AUTO: 0.02 K/UL (ref 0–0.04)
IMM GRANULOCYTES NFR BLD AUTO: 0.3 % (ref 0–0.5)
IRON SERPL-MCNC: 39 UG/DL (ref 30–160)
LYMPHOCYTES # BLD AUTO: 2.2 K/UL (ref 1–4.8)
LYMPHOCYTES NFR BLD: 33.3 % (ref 18–48)
MCH RBC QN AUTO: 26.5 PG (ref 27–31)
MCHC RBC AUTO-ENTMCNC: 31 G/DL (ref 32–36)
MCV RBC AUTO: 86 FL (ref 82–98)
MONOCYTES # BLD AUTO: 0.6 K/UL (ref 0.3–1)
MONOCYTES NFR BLD: 9 % (ref 4–15)
NEUTROPHILS # BLD AUTO: 3.7 K/UL (ref 1.8–7.7)
NEUTROPHILS NFR BLD: 55.5 % (ref 38–73)
NRBC BLD-RTO: 0 /100 WBC
PLATELET # BLD AUTO: 255 K/UL (ref 150–450)
PMV BLD AUTO: 12.4 FL (ref 9.2–12.9)
RBC # BLD AUTO: 4.52 M/UL (ref 4–5.4)
SATURATED IRON: 8 % (ref 20–50)
TOTAL IRON BINDING CAPACITY: 474 UG/DL (ref 250–450)
TRANSFERRIN SERPL-MCNC: 320 MG/DL (ref 200–375)
VIT B12 SERPL-MCNC: 268 PG/ML (ref 210–950)
WBC # BLD AUTO: 6.67 K/UL (ref 3.9–12.7)

## 2023-04-03 PROCEDURE — 82728 ASSAY OF FERRITIN: CPT | Performed by: FAMILY MEDICINE

## 2023-04-03 PROCEDURE — 84466 ASSAY OF TRANSFERRIN: CPT | Performed by: FAMILY MEDICINE

## 2023-04-03 PROCEDURE — 82607 VITAMIN B-12: CPT | Performed by: FAMILY MEDICINE

## 2023-04-03 PROCEDURE — 36415 COLL VENOUS BLD VENIPUNCTURE: CPT | Mod: PO | Performed by: FAMILY MEDICINE

## 2023-04-03 PROCEDURE — 85025 COMPLETE CBC W/AUTO DIFF WBC: CPT | Performed by: FAMILY MEDICINE

## 2023-04-04 ENCOUNTER — TELEPHONE (OUTPATIENT)
Dept: PRIMARY CARE CLINIC | Facility: CLINIC | Age: 68
End: 2023-04-04
Payer: MEDICARE

## 2023-04-04 DIAGNOSIS — D50.9 IRON DEFICIENCY ANEMIA, UNSPECIFIED IRON DEFICIENCY ANEMIA TYPE: Primary | ICD-10-CM

## 2023-04-04 NOTE — TELEPHONE ENCOUNTER
Please call to discuss labs.  I did send a detailed explanation through results release that you can review with the patient if she is not already reviewed this.      Please schedule repeat labs, CBC and iron studies, in about two months.

## 2023-04-05 NOTE — TELEPHONE ENCOUNTER
Spoke with pt, discussed your message, pt verbalized understanding.     Pt will start taking iron and B-12 or B complex.     Scheduled for labs in 2 months.

## 2023-04-13 ENCOUNTER — TELEPHONE (OUTPATIENT)
Dept: GASTROENTEROLOGY | Facility: CLINIC | Age: 68
End: 2023-04-13
Payer: MEDICARE

## 2023-04-28 ENCOUNTER — TELEPHONE (OUTPATIENT)
Dept: GASTROENTEROLOGY | Facility: CLINIC | Age: 68
End: 2023-04-28
Payer: MEDICARE

## 2023-05-03 ENCOUNTER — TELEPHONE (OUTPATIENT)
Dept: GASTROENTEROLOGY | Facility: CLINIC | Age: 68
End: 2023-05-03
Payer: MEDICARE

## 2023-05-03 NOTE — TELEPHONE ENCOUNTER
Spoke with pt. Scheduled c-scope. Prep instructions placed in mail & sent to pt's mychart. Pt verbalized understanding to all

## 2023-05-03 NOTE — TELEPHONE ENCOUNTER
----- Message from Freddie Tucker sent at 5/3/2023 11:29 AM CDT -----  Type:  Patient Returning Call    Who Called:  Patient  Who Left Message for Patient:  [facility]  Does the patient know what this is regarding?:  Scheduling colonoscopy  Best Call Back Number:  [phone]  Additional Information:

## 2023-05-19 ENCOUNTER — TELEPHONE (OUTPATIENT)
Dept: GASTROENTEROLOGY | Facility: CLINIC | Age: 68
End: 2023-05-19
Payer: MEDICARE

## 2023-05-19 NOTE — TELEPHONE ENCOUNTER
Attempted to reach the patient, left message asking for a call back to clinic as Dr. Quintero will be unavailable on 07/03/2023 to perform her procedure, clinic number provided.

## 2023-05-23 ENCOUNTER — TELEPHONE (OUTPATIENT)
Dept: GASTROENTEROLOGY | Facility: CLINIC | Age: 68
End: 2023-05-23
Payer: MEDICARE

## 2023-05-23 NOTE — TELEPHONE ENCOUNTER
Called and spoke with the patient, patient's procedure was rescheduled with her as Dr. Quintero will not be here on 07/03/2023, patient states that she has her prep instructions at home.

## 2023-06-05 ENCOUNTER — LAB VISIT (OUTPATIENT)
Dept: LAB | Facility: HOSPITAL | Age: 68
End: 2023-06-05
Attending: FAMILY MEDICINE
Payer: MEDICARE

## 2023-06-05 DIAGNOSIS — D50.9 IRON DEFICIENCY ANEMIA, UNSPECIFIED IRON DEFICIENCY ANEMIA TYPE: ICD-10-CM

## 2023-06-05 LAB
BASOPHILS # BLD AUTO: 0.04 K/UL (ref 0–0.2)
BASOPHILS NFR BLD: 0.7 % (ref 0–1.9)
DIFFERENTIAL METHOD: ABNORMAL
EOSINOPHIL # BLD AUTO: 0.1 K/UL (ref 0–0.5)
EOSINOPHIL NFR BLD: 2.6 % (ref 0–8)
ERYTHROCYTE [DISTWIDTH] IN BLOOD BY AUTOMATED COUNT: 14.5 % (ref 11.5–14.5)
FERRITIN SERPL-MCNC: 9 NG/ML (ref 20–300)
HCT VFR BLD AUTO: 34.2 % (ref 37–48.5)
HGB BLD-MCNC: 10.6 G/DL (ref 12–16)
IMM GRANULOCYTES # BLD AUTO: 0.01 K/UL (ref 0–0.04)
IMM GRANULOCYTES NFR BLD AUTO: 0.2 % (ref 0–0.5)
IRON SERPL-MCNC: 28 UG/DL (ref 30–160)
LYMPHOCYTES # BLD AUTO: 1.9 K/UL (ref 1–4.8)
LYMPHOCYTES NFR BLD: 36.1 % (ref 18–48)
MCH RBC QN AUTO: 25.8 PG (ref 27–31)
MCHC RBC AUTO-ENTMCNC: 31 G/DL (ref 32–36)
MCV RBC AUTO: 83 FL (ref 82–98)
MONOCYTES # BLD AUTO: 0.6 K/UL (ref 0.3–1)
MONOCYTES NFR BLD: 12 % (ref 4–15)
NEUTROPHILS # BLD AUTO: 2.6 K/UL (ref 1.8–7.7)
NEUTROPHILS NFR BLD: 48.4 % (ref 38–73)
NRBC BLD-RTO: 0 /100 WBC
PLATELET # BLD AUTO: 240 K/UL (ref 150–450)
PMV BLD AUTO: 12.1 FL (ref 9.2–12.9)
RBC # BLD AUTO: 4.11 M/UL (ref 4–5.4)
SATURATED IRON: 6 % (ref 20–50)
TOTAL IRON BINDING CAPACITY: 438 UG/DL (ref 250–450)
TRANSFERRIN SERPL-MCNC: 296 MG/DL (ref 200–375)
WBC # BLD AUTO: 5.34 K/UL (ref 3.9–12.7)

## 2023-06-05 PROCEDURE — 85025 COMPLETE CBC W/AUTO DIFF WBC: CPT | Performed by: FAMILY MEDICINE

## 2023-06-05 PROCEDURE — 36415 COLL VENOUS BLD VENIPUNCTURE: CPT | Mod: PO | Performed by: FAMILY MEDICINE

## 2023-06-05 PROCEDURE — 82728 ASSAY OF FERRITIN: CPT | Performed by: FAMILY MEDICINE

## 2023-06-05 PROCEDURE — 84466 ASSAY OF TRANSFERRIN: CPT | Performed by: FAMILY MEDICINE

## 2023-06-07 ENCOUNTER — PATIENT MESSAGE (OUTPATIENT)
Dept: PRIMARY CARE CLINIC | Facility: CLINIC | Age: 68
End: 2023-06-07
Payer: MEDICARE

## 2023-06-07 ENCOUNTER — TELEPHONE (OUTPATIENT)
Dept: PRIMARY CARE CLINIC | Facility: CLINIC | Age: 68
End: 2023-06-07
Payer: MEDICARE

## 2023-06-07 DIAGNOSIS — D50.9 IRON DEFICIENCY ANEMIA, UNSPECIFIED IRON DEFICIENCY ANEMIA TYPE: Primary | ICD-10-CM

## 2023-06-07 NOTE — TELEPHONE ENCOUNTER
Please call regarding lab results.  She does have a persistent and mildly worsening anemia.  Iron levels remain low.  It looks like she has a colonoscopy scheduled.    I do want her to complete hemoccult stool.  Orders entered.      Also please see if she is tolerating/taking the oral iron.

## 2023-06-15 NOTE — TELEPHONE ENCOUNTER
Spoke with pt, discussed your message, pt verbalized understanding.     Pt reports that she was taking ferrous sulfate 325 mg on MWF and it would hurt pt's stomach that day and the following day.  Additionally, pt was taking fiberwell gummies and a stool softener.     Please advise if there is another iron supplement that would be more gentle on her stomach.

## 2023-06-16 ENCOUNTER — TELEPHONE (OUTPATIENT)
Dept: PRIMARY CARE CLINIC | Facility: CLINIC | Age: 68
End: 2023-06-16
Payer: MEDICARE

## 2023-06-16 NOTE — TELEPHONE ENCOUNTER
She may want to try a different iron preparation such as Niferex (iron polysaccharide), which may be easier to tolerate.  It is OTC.    However, make sure that she does not take iron supplements the week that she is checking the stool samples.

## 2023-06-16 NOTE — TELEPHONE ENCOUNTER
----- Message from Kaci Eckert sent at 6/16/2023  2:40 PM CDT -----  Regarding: urine test  [phone] - call back she needs instruction for urine collection

## 2023-06-19 ENCOUNTER — LAB VISIT (OUTPATIENT)
Dept: LAB | Facility: HOSPITAL | Age: 68
End: 2023-06-19
Attending: FAMILY MEDICINE
Payer: MEDICARE

## 2023-06-19 DIAGNOSIS — D50.9 IRON DEFICIENCY ANEMIA, UNSPECIFIED IRON DEFICIENCY ANEMIA TYPE: ICD-10-CM

## 2023-06-19 LAB — OB PNL STL: NEGATIVE

## 2023-06-19 PROCEDURE — 82272 OCCULT BLD FECES 1-3 TESTS: CPT | Mod: 91 | Performed by: FAMILY MEDICINE

## 2023-06-20 LAB
OB PNL STL: NEGATIVE
OB PNL STL: NEGATIVE

## 2023-06-26 ENCOUNTER — TELEPHONE (OUTPATIENT)
Dept: GASTROENTEROLOGY | Facility: CLINIC | Age: 68
End: 2023-06-26
Payer: MEDICARE

## 2023-06-26 ENCOUNTER — TELEPHONE (OUTPATIENT)
Dept: PRIMARY CARE CLINIC | Facility: CLINIC | Age: 68
End: 2023-06-26
Payer: MEDICARE

## 2023-06-26 DIAGNOSIS — D50.9 IRON DEFICIENCY ANEMIA, UNSPECIFIED IRON DEFICIENCY ANEMIA TYPE: Primary | ICD-10-CM

## 2023-06-26 NOTE — TELEPHONE ENCOUNTER
I call Ms. Hill to reschedule C-scope because Surgeon will not be available. Patient doesn't answer. I leave a brief voicemail to return phone call. Callback number provided. Will also send a [facility] msg if active.

## 2023-06-26 NOTE — TELEPHONE ENCOUNTER
Colonoscopy is scheduled on 8/18 with Dr. Wise at [address] [facility] [address] in [place] as requested by patient. Prep instructions has been sent via [facility] and via mail. Address is confirmed. Patient is informed the preop Nurse will call him/her with the arrival time a day or two prior to procedure. Patient verbalizes understanding.

## 2023-06-26 NOTE — TELEPHONE ENCOUNTER
----- Message from Rachael Fofana LPN sent at 6/26/2023  1:28 PM CDT -----    ----- Message -----  From: Nakia Cisneros  Sent: 6/26/2023  12:51 PM CDT  To: [facility] Clinical Staff    Type:  Patient Returning Call    Who Called:Pt     Who Left Message for Patient:Rudolph Centeno LPN    Does the patient know what this is regarding?:yes Pt has a few question in regards to the colonoscopy reschedule     Would the patient rather a call back or a response via [facility]?  Call back     Best Call Back Number:[phone] (mobile)     Additional Information: message was also sent to Rudolph Centeno LPN in teams at 12:50pm

## 2023-06-26 NOTE — TELEPHONE ENCOUNTER
The stool test were negative for blood.  She can begin the iron (Niferex) that was previously discussed.  If she has difficulty with that preparation as well then she may want to consider hematology consultation to consider intravenous iron.  However, findings are relatively mild so I would like for her to try the oral preparation 1st.      Please schedule repeat labs in about six weeks.

## 2023-06-28 ENCOUNTER — TELEPHONE (OUTPATIENT)
Dept: PRIMARY CARE CLINIC | Facility: CLINIC | Age: 68
End: 2023-06-28
Payer: MEDICARE

## 2023-06-28 NOTE — TELEPHONE ENCOUNTER
----- Message from Kaci Eckert sent at 6/27/2023 11:07 AM CDT -----  Regarding: requesting a call back  [phone] - call back ; have a question about iron supplement that Dr. Khanna recommended but she can not find it. Talk to her about colonoscopy schedule was reschedule twice from July - august now august - October and wanting to see if  agrees to do it by a different doctor ( August 18 availability) or to stick with Dr. Quintero (sometime in October)       Kaci

## 2023-06-28 NOTE — TELEPHONE ENCOUNTER
Spoke with pt, discussed your message, pt verbalized understanding.     Confirmed with Dr. Wilkinson that pt can take Ferrex OTC iron supplement.  Pt scheduled for 6 wk lab appt.     Pt will call GI to find out who does Miralax prep for her colonoscopy.

## 2023-07-14 ENCOUNTER — TELEPHONE (OUTPATIENT)
Dept: GASTROENTEROLOGY | Facility: CLINIC | Age: 68
End: 2023-07-14
Payer: MEDICARE

## 2023-07-14 NOTE — TELEPHONE ENCOUNTER
Colonoscopy is rescheduled to 11/6 with Dr. Quintero at [address] [facility] [address] in [place] as requested by patient. Prep instructions has been sent via [facility] and via mail. Address is confirmed. Patient is informed the preop Nurse will call him/her with the arrival time a day or two prior to procedure. Patient verbalizes understanding.

## 2023-07-28 DIAGNOSIS — F40.248 OTHER SITUATIONAL TYPE PHOBIA: ICD-10-CM

## 2023-07-28 RX ORDER — ALPRAZOLAM 0.5 MG/1
0.5 TABLET ORAL DAILY PRN
Qty: 15 TABLET | Refills: 1 | Status: SHIPPED | OUTPATIENT
Start: 2023-07-28 | End: 2024-01-12

## 2023-08-07 ENCOUNTER — TELEPHONE (OUTPATIENT)
Dept: PRIMARY CARE CLINIC | Facility: CLINIC | Age: 68
End: 2023-08-07
Payer: MEDICARE

## 2023-08-07 ENCOUNTER — LAB VISIT (OUTPATIENT)
Dept: LAB | Facility: HOSPITAL | Age: 68
End: 2023-08-07
Attending: FAMILY MEDICINE
Payer: MEDICARE

## 2023-08-07 DIAGNOSIS — D50.9 IRON DEFICIENCY ANEMIA, UNSPECIFIED IRON DEFICIENCY ANEMIA TYPE: Primary | ICD-10-CM

## 2023-08-07 DIAGNOSIS — E03.9 ACQUIRED HYPOTHYROIDISM: ICD-10-CM

## 2023-08-07 DIAGNOSIS — D50.9 IRON DEFICIENCY ANEMIA, UNSPECIFIED IRON DEFICIENCY ANEMIA TYPE: ICD-10-CM

## 2023-08-07 LAB
BASOPHILS # BLD AUTO: 0.04 K/UL (ref 0–0.2)
BASOPHILS NFR BLD: 0.8 % (ref 0–1.9)
DIFFERENTIAL METHOD: ABNORMAL
EOSINOPHIL # BLD AUTO: 0.1 K/UL (ref 0–0.5)
EOSINOPHIL NFR BLD: 2.1 % (ref 0–8)
ERYTHROCYTE [DISTWIDTH] IN BLOOD BY AUTOMATED COUNT: 16.5 % (ref 11.5–14.5)
FERRITIN SERPL-MCNC: 14 NG/ML (ref 20–300)
HCT VFR BLD AUTO: 36.1 % (ref 37–48.5)
HGB BLD-MCNC: 11.6 G/DL (ref 12–16)
IMM GRANULOCYTES # BLD AUTO: 0.01 K/UL (ref 0–0.04)
IMM GRANULOCYTES NFR BLD AUTO: 0.2 % (ref 0–0.5)
IRON SERPL-MCNC: 42 UG/DL (ref 30–160)
LYMPHOCYTES # BLD AUTO: 1.8 K/UL (ref 1–4.8)
LYMPHOCYTES NFR BLD: 37.6 % (ref 18–48)
MCH RBC QN AUTO: 25.5 PG (ref 27–31)
MCHC RBC AUTO-ENTMCNC: 32.1 G/DL (ref 32–36)
MCV RBC AUTO: 79 FL (ref 82–98)
MONOCYTES # BLD AUTO: 0.5 K/UL (ref 0.3–1)
MONOCYTES NFR BLD: 11.2 % (ref 4–15)
NEUTROPHILS # BLD AUTO: 2.3 K/UL (ref 1.8–7.7)
NEUTROPHILS NFR BLD: 48.1 % (ref 38–73)
NRBC BLD-RTO: 0 /100 WBC
PLATELET # BLD AUTO: 223 K/UL (ref 150–450)
PMV BLD AUTO: 11.5 FL (ref 9.2–12.9)
RBC # BLD AUTO: 4.55 M/UL (ref 4–5.4)
SATURATED IRON: 9 % (ref 20–50)
TOTAL IRON BINDING CAPACITY: 444 UG/DL (ref 250–450)
TRANSFERRIN SERPL-MCNC: 300 MG/DL (ref 200–375)
TSH SERPL DL<=0.005 MIU/L-ACNC: 1.88 UIU/ML (ref 0.4–4)
WBC # BLD AUTO: 4.84 K/UL (ref 3.9–12.7)

## 2023-08-07 PROCEDURE — 85025 COMPLETE CBC W/AUTO DIFF WBC: CPT | Performed by: FAMILY MEDICINE

## 2023-08-07 PROCEDURE — 84443 ASSAY THYROID STIM HORMONE: CPT | Performed by: FAMILY MEDICINE

## 2023-08-07 PROCEDURE — 36415 COLL VENOUS BLD VENIPUNCTURE: CPT | Mod: PO | Performed by: FAMILY MEDICINE

## 2023-08-07 PROCEDURE — 82728 ASSAY OF FERRITIN: CPT | Performed by: FAMILY MEDICINE

## 2023-08-07 PROCEDURE — 84466 ASSAY OF TRANSFERRIN: CPT | Performed by: FAMILY MEDICINE

## 2023-08-08 NOTE — TELEPHONE ENCOUNTER
Spoke with pt, discussed lab results and she reports that she has been taking 1 iron capsule every morning without missing any doses.  Pt was advised that she needs to wait at least 4 hrs after taking levothyroxine to take an iron supplement and that she should take her iron supplement on an empty stomach with Vitamin C for maximal absorption.  Pt was educated that she could take it 2 hours after eating or at bedtime.  Pt verbalized understanding and reports that she would like a referral to Hematology.  Referral created and pt scheduled to see Hematology this week.

## 2023-08-08 NOTE — TELEPHONE ENCOUNTER
Please call regarding lab results.  Mild but minimal improvement in the iron-deficiency.  Mild improvement in the hemoglobin.  If she is taking the iron daily then she is slow to respond and may want to consider consulting with Hematology to consider alternative iron replacement.  If she is not been taking the iron supplement then she should start.  Let me know how she is doing and whether or not she is been taking the iron supplement.

## 2023-08-10 ENCOUNTER — OFFICE VISIT (OUTPATIENT)
Dept: HEMATOLOGY/ONCOLOGY | Facility: CLINIC | Age: 68
End: 2023-08-10
Payer: MEDICARE

## 2023-08-10 ENCOUNTER — LAB VISIT (OUTPATIENT)
Dept: LAB | Facility: HOSPITAL | Age: 68
End: 2023-08-10
Attending: INTERNAL MEDICINE
Payer: MEDICARE

## 2023-08-10 VITALS
WEIGHT: 163.81 LBS | SYSTOLIC BLOOD PRESSURE: 144 MMHG | TEMPERATURE: 98 F | HEIGHT: 64 IN | HEART RATE: 90 BPM | DIASTOLIC BLOOD PRESSURE: 82 MMHG | BODY MASS INDEX: 27.96 KG/M2 | OXYGEN SATURATION: 99 % | RESPIRATION RATE: 16 BRPM

## 2023-08-10 DIAGNOSIS — D50.9 IRON DEFICIENCY ANEMIA, UNSPECIFIED IRON DEFICIENCY ANEMIA TYPE: ICD-10-CM

## 2023-08-10 LAB
BACTERIA #/AREA URNS HPF: ABNORMAL /HPF
BILIRUB UR QL STRIP: NEGATIVE
CLARITY UR: CLEAR
COLOR UR: YELLOW
GLUCOSE UR QL STRIP: NEGATIVE
HGB UR QL STRIP: ABNORMAL
KETONES UR QL STRIP: NEGATIVE
LEUKOCYTE ESTERASE UR QL STRIP: ABNORMAL
MICROSCOPIC COMMENT: ABNORMAL
NITRITE UR QL STRIP: NEGATIVE
PH UR STRIP: 6 [PH] (ref 5–8)
PROT UR QL STRIP: NEGATIVE
RBC #/AREA URNS HPF: 1 /HPF (ref 0–4)
SP GR UR STRIP: 1.02 (ref 1–1.03)
SQUAMOUS #/AREA URNS HPF: 2 /HPF
URN SPEC COLLECT METH UR: ABNORMAL
WBC #/AREA URNS HPF: 1 /HPF (ref 0–5)

## 2023-08-10 PROCEDURE — 1159F MED LIST DOCD IN RCRD: CPT | Mod: CPTII,S$GLB,, | Performed by: INTERNAL MEDICINE

## 2023-08-10 PROCEDURE — 3288F FALL RISK ASSESSMENT DOCD: CPT | Mod: CPTII,S$GLB,, | Performed by: INTERNAL MEDICINE

## 2023-08-10 PROCEDURE — 3008F PR BODY MASS INDEX (BMI) DOCUMENTED: ICD-10-PCS | Mod: CPTII,S$GLB,, | Performed by: INTERNAL MEDICINE

## 2023-08-10 PROCEDURE — 99205 OFFICE O/P NEW HI 60 MIN: CPT | Mod: S$GLB,,, | Performed by: INTERNAL MEDICINE

## 2023-08-10 PROCEDURE — 3008F BODY MASS INDEX DOCD: CPT | Mod: CPTII,S$GLB,, | Performed by: INTERNAL MEDICINE

## 2023-08-10 PROCEDURE — 1101F PR PT FALLS ASSESS DOC 0-1 FALLS W/OUT INJ PAST YR: ICD-10-PCS | Mod: CPTII,S$GLB,, | Performed by: INTERNAL MEDICINE

## 2023-08-10 PROCEDURE — 4010F PR ACE/ARB THEARPY RXD/TAKEN: ICD-10-PCS | Mod: CPTII,S$GLB,, | Performed by: INTERNAL MEDICINE

## 2023-08-10 PROCEDURE — 3077F SYST BP >= 140 MM HG: CPT | Mod: CPTII,S$GLB,, | Performed by: INTERNAL MEDICINE

## 2023-08-10 PROCEDURE — 3079F PR MOST RECENT DIASTOLIC BLOOD PRESSURE 80-89 MM HG: ICD-10-PCS | Mod: CPTII,S$GLB,, | Performed by: INTERNAL MEDICINE

## 2023-08-10 PROCEDURE — 1159F PR MEDICATION LIST DOCUMENTED IN MEDICAL RECORD: ICD-10-PCS | Mod: CPTII,S$GLB,, | Performed by: INTERNAL MEDICINE

## 2023-08-10 PROCEDURE — 81000 URINALYSIS NONAUTO W/SCOPE: CPT | Mod: PN | Performed by: INTERNAL MEDICINE

## 2023-08-10 PROCEDURE — 1126F AMNT PAIN NOTED NONE PRSNT: CPT | Mod: CPTII,S$GLB,, | Performed by: INTERNAL MEDICINE

## 2023-08-10 PROCEDURE — 3079F DIAST BP 80-89 MM HG: CPT | Mod: CPTII,S$GLB,, | Performed by: INTERNAL MEDICINE

## 2023-08-10 PROCEDURE — 3288F PR FALLS RISK ASSESSMENT DOCUMENTED: ICD-10-PCS | Mod: CPTII,S$GLB,, | Performed by: INTERNAL MEDICINE

## 2023-08-10 PROCEDURE — 1126F PR PAIN SEVERITY QUANTIFIED, NO PAIN PRESENT: ICD-10-PCS | Mod: CPTII,S$GLB,, | Performed by: INTERNAL MEDICINE

## 2023-08-10 PROCEDURE — 3077F PR MOST RECENT SYSTOLIC BLOOD PRESSURE >= 140 MM HG: ICD-10-PCS | Mod: CPTII,S$GLB,, | Performed by: INTERNAL MEDICINE

## 2023-08-10 PROCEDURE — 4010F ACE/ARB THERAPY RXD/TAKEN: CPT | Mod: CPTII,S$GLB,, | Performed by: INTERNAL MEDICINE

## 2023-08-10 PROCEDURE — 1101F PT FALLS ASSESS-DOCD LE1/YR: CPT | Mod: CPTII,S$GLB,, | Performed by: INTERNAL MEDICINE

## 2023-08-10 PROCEDURE — 99205 PR OFFICE/OUTPT VISIT, NEW, LEVL V, 60-74 MIN: ICD-10-PCS | Mod: S$GLB,,, | Performed by: INTERNAL MEDICINE

## 2023-08-10 PROCEDURE — 99999 PR PBB SHADOW E&M-EST. PATIENT-LVL IV: CPT | Mod: PBBFAC,,, | Performed by: INTERNAL MEDICINE

## 2023-08-10 PROCEDURE — 99999 PR PBB SHADOW E&M-EST. PATIENT-LVL IV: ICD-10-PCS | Mod: PBBFAC,,, | Performed by: INTERNAL MEDICINE

## 2023-08-10 NOTE — PROGRESS NOTES
Subjective     Patient ID: Bhavani Hill is a 68 y.o. female.    Chief Complaint: DEREJE    HPI    Mrs Hill is a 68-year-old female referred for iron deficiency anemia.  The most recent labs are from 08/07/2023 and they are as follows:  Ferritin was 14 ng/mL  WBCs were 4,800 per cubic mm, hemoglobin 11.6 grams/deciliter, hematocrit 36.1%, MCV 79, and platelets 223 K.  A CBC on 06/05/2023 showed a white count of 5300 per cubic mm, hemoglobin 10.6 grams/deciliter, hematocrit 34.2%, MCV 82 and platelets 240 K.  On the same day the ferritin was 9 ng/mL, while 3 stool samples that she submitted were negative for occult blood  A CBC on 05/04/2023 had shown a white count of 5400 per cubic mm, hemoglobin 11.6 grams/deciliter, hematocrit 36.1%, MCV 89 and platelets 197 K.  A CBC on May 19, 2022 had shown a white count of 4500 per cubic mm, hemoglobin 12.7 grams/deciliter, hematocrit 39.9%, MCV 85 and platelets 234 K.  She is referred for evaluation.  Other pertinent labs include a ferritin of 10 ng/mL.  On April 3, 2023  Three stool samples she has submitted in mid June were negative for occult blood.    She states that she was prescribed an oral formulation of iron in early June which she did not tolerate well, and she was switched to a different formulation that she can tolerate better.    PAST MEDICAL HISTORY:  Significant for hypertension, anxiety, and hypothyroidism  PAST SURGICAL HISTORY:  Significant for 2 C sections and a cholecystectomy  SOCIAL HISTORY:  She is a retired teacher.  She is .  She does not smoke but she does drink a martini every night  FAMILY HISTORY:  Mother had breast cancer and so did 1 of her sisters      Review of Systems  Overall she feels well. She is anxious about the referral but she denies any depression, easy bruising, fevers, chills, night  sweats, weight loss, nausea, vomiting, diarrhea, constipation, diplopia, blurred vision, headache, chest pain, palpitations, shortness of breath,  breast pain, abdominal pain, extremity pain, or difficulty ambulating.  The remainder of the ten-point ROS, including general, skin, lymph, H/N, cardiorespiratory, GI, , Neuro, Endocrine, and psychiatric is negative.    Physical Exam      She is alert, oriented to time, place, person, pleasant, well      nourished, in no acute physical distress.                                    VITAL SIGNS:  Reviewed                                      HEENT:  Normal.  There are no nasal, oral, lip, gingival, auricular, lid,    or conjunctival lesions.  Mucosae are moist and pink, and there is no        thrush.  Pupils are equal, reactive to light and accommodation.              Extraocular muscle movements are intact.  Dentition is good.  There is no frontal or maxillary tenderness.                                     NECK:  Supple without JVD, adenopathy, or thyromegaly.                       LUNGS:  Clear to auscultation without wheezing, rales, or rhonchi.           CARDIOVASCULAR:  Reveals an S1, S2, no murmurs, no rubs, no gallops.         ABDOMEN:  Soft, nontender, without organomegaly.  Bowel sounds are    present.                                                                     EXTREMITIES:  No cyanosis, clubbing, or edema.                               BREASTS:  DEFERRED.                                     LYMPHATIC:  There is no cervical, axillary, or supraclavicular adenopathy.   SKIN:  Warm and moist, without petechiae, rashes, induration, or ecchymoses.           NEUROLOGIC:  DTRs are 0-1+ bilaterally, symmetrical, motor function is 5/5,  and cranial nerves are  within normal limits.      ASSESSMENT  Borderline anemia with evidence of iron deficiency.  Iron deficiency, on oral iron supplements with an improving ferritin level      PLAN  I had a long discussion with Mrs. Hill.  We went over the differential of anemia in general and iron deficiency anemia in particular.  Her anemia is only borderline and her  ferritin appears to be increasing with a 2nd formulation of iron, which is better tolerated.  We also discussed the option of IV supplementation therapy.  At this point we will proceed as follows:  She will submit a urine sample to ascertain that she does not have any blood in the urine  I urged her to expedite the colonoscopy that she wants to schedule for November  She will remain on oral iron daily  I will see her in 6 weeks with a repeat CBC and a repeat ferritin.    Her multiple questions were answered to her satisfaction.  I spent approximately 60 minutes reviewing the available records and evaluating the patient, out of which over 50% of the time was spent face to face with the patient in counseling and coordinating this patient's care.

## 2023-08-30 ENCOUNTER — OFFICE VISIT (OUTPATIENT)
Dept: PRIMARY CARE CLINIC | Facility: CLINIC | Age: 68
End: 2023-08-30
Payer: MEDICARE

## 2023-08-30 VITALS
BODY MASS INDEX: 27.59 KG/M2 | SYSTOLIC BLOOD PRESSURE: 134 MMHG | RESPIRATION RATE: 17 BRPM | HEART RATE: 65 BPM | DIASTOLIC BLOOD PRESSURE: 84 MMHG | WEIGHT: 161.63 LBS | OXYGEN SATURATION: 98 % | HEIGHT: 64 IN | TEMPERATURE: 98 F

## 2023-08-30 DIAGNOSIS — E03.9 HYPOTHYROIDISM, UNSPECIFIED TYPE: Chronic | ICD-10-CM

## 2023-08-30 DIAGNOSIS — R13.10 DYSPHAGIA, UNSPECIFIED TYPE: ICD-10-CM

## 2023-08-30 DIAGNOSIS — R19.8 FULLNESS OF ABDOMEN: ICD-10-CM

## 2023-08-30 DIAGNOSIS — R10.84 GENERALIZED ABDOMINAL PAIN: ICD-10-CM

## 2023-08-30 DIAGNOSIS — D50.9 IRON DEFICIENCY ANEMIA, UNSPECIFIED IRON DEFICIENCY ANEMIA TYPE: Primary | ICD-10-CM

## 2023-08-30 DIAGNOSIS — R10.9 ABDOMINAL PAIN, UNSPECIFIED ABDOMINAL LOCATION: ICD-10-CM

## 2023-08-30 DIAGNOSIS — I10 ESSENTIAL HYPERTENSION: Chronic | ICD-10-CM

## 2023-08-30 PROBLEM — T28.0XXA: Status: RESOLVED | Noted: 2021-10-06 | Resolved: 2023-08-30

## 2023-08-30 PROCEDURE — 3079F PR MOST RECENT DIASTOLIC BLOOD PRESSURE 80-89 MM HG: ICD-10-PCS | Mod: CPTII,S$GLB,, | Performed by: FAMILY MEDICINE

## 2023-08-30 PROCEDURE — 1101F PR PT FALLS ASSESS DOC 0-1 FALLS W/OUT INJ PAST YR: ICD-10-PCS | Mod: CPTII,S$GLB,, | Performed by: FAMILY MEDICINE

## 2023-08-30 PROCEDURE — 1160F PR REVIEW ALL MEDS BY PRESCRIBER/CLIN PHARMACIST DOCUMENTED: ICD-10-PCS | Mod: CPTII,S$GLB,, | Performed by: FAMILY MEDICINE

## 2023-08-30 PROCEDURE — 3008F PR BODY MASS INDEX (BMI) DOCUMENTED: ICD-10-PCS | Mod: CPTII,S$GLB,, | Performed by: FAMILY MEDICINE

## 2023-08-30 PROCEDURE — 99214 PR OFFICE/OUTPT VISIT, EST, LEVL IV, 30-39 MIN: ICD-10-PCS | Mod: S$GLB,,, | Performed by: FAMILY MEDICINE

## 2023-08-30 PROCEDURE — 3075F PR MOST RECENT SYSTOLIC BLOOD PRESS GE 130-139MM HG: ICD-10-PCS | Mod: CPTII,S$GLB,, | Performed by: FAMILY MEDICINE

## 2023-08-30 PROCEDURE — 1158F ADVNC CARE PLAN TLK DOCD: CPT | Mod: CPTII,S$GLB,, | Performed by: FAMILY MEDICINE

## 2023-08-30 PROCEDURE — 3288F PR FALLS RISK ASSESSMENT DOCUMENTED: ICD-10-PCS | Mod: CPTII,S$GLB,, | Performed by: FAMILY MEDICINE

## 2023-08-30 PROCEDURE — 3288F FALL RISK ASSESSMENT DOCD: CPT | Mod: CPTII,S$GLB,, | Performed by: FAMILY MEDICINE

## 2023-08-30 PROCEDURE — 1160F RVW MEDS BY RX/DR IN RCRD: CPT | Mod: CPTII,S$GLB,, | Performed by: FAMILY MEDICINE

## 2023-08-30 PROCEDURE — 1101F PT FALLS ASSESS-DOCD LE1/YR: CPT | Mod: CPTII,S$GLB,, | Performed by: FAMILY MEDICINE

## 2023-08-30 PROCEDURE — 99999 PR PBB SHADOW E&M-EST. PATIENT-LVL V: CPT | Mod: PBBFAC,,, | Performed by: FAMILY MEDICINE

## 2023-08-30 PROCEDURE — 3079F DIAST BP 80-89 MM HG: CPT | Mod: CPTII,S$GLB,, | Performed by: FAMILY MEDICINE

## 2023-08-30 PROCEDURE — 1159F MED LIST DOCD IN RCRD: CPT | Mod: CPTII,S$GLB,, | Performed by: FAMILY MEDICINE

## 2023-08-30 PROCEDURE — 3008F BODY MASS INDEX DOCD: CPT | Mod: CPTII,S$GLB,, | Performed by: FAMILY MEDICINE

## 2023-08-30 PROCEDURE — 99999 PR PBB SHADOW E&M-EST. PATIENT-LVL V: ICD-10-PCS | Mod: PBBFAC,,, | Performed by: FAMILY MEDICINE

## 2023-08-30 PROCEDURE — 1126F PR PAIN SEVERITY QUANTIFIED, NO PAIN PRESENT: ICD-10-PCS | Mod: CPTII,S$GLB,, | Performed by: FAMILY MEDICINE

## 2023-08-30 PROCEDURE — 1159F PR MEDICATION LIST DOCUMENTED IN MEDICAL RECORD: ICD-10-PCS | Mod: CPTII,S$GLB,, | Performed by: FAMILY MEDICINE

## 2023-08-30 PROCEDURE — 4010F PR ACE/ARB THEARPY RXD/TAKEN: ICD-10-PCS | Mod: CPTII,S$GLB,, | Performed by: FAMILY MEDICINE

## 2023-08-30 PROCEDURE — 99214 OFFICE O/P EST MOD 30 MIN: CPT | Mod: S$GLB,,, | Performed by: FAMILY MEDICINE

## 2023-08-30 PROCEDURE — 3075F SYST BP GE 130 - 139MM HG: CPT | Mod: CPTII,S$GLB,, | Performed by: FAMILY MEDICINE

## 2023-08-30 PROCEDURE — 4010F ACE/ARB THERAPY RXD/TAKEN: CPT | Mod: CPTII,S$GLB,, | Performed by: FAMILY MEDICINE

## 2023-08-30 PROCEDURE — 1126F AMNT PAIN NOTED NONE PRSNT: CPT | Mod: CPTII,S$GLB,, | Performed by: FAMILY MEDICINE

## 2023-08-30 PROCEDURE — 1158F PR ADVANCE CARE PLANNING DISCUSS DOCUMENTED IN MEDICAL RECORD: ICD-10-PCS | Mod: CPTII,S$GLB,, | Performed by: FAMILY MEDICINE

## 2023-08-30 RX ORDER — OMEGA-3S/DHA/EPA/FISH OIL 1000-1400
1 CAPSULE,DELAYED RELEASE (ENTERIC COATED) ORAL DAILY
COMMUNITY

## 2023-08-30 RX ORDER — IBUPROFEN 100 MG/5ML
1000 SUSPENSION, ORAL (FINAL DOSE FORM) ORAL DAILY
COMMUNITY

## 2023-08-30 RX ORDER — DOCUSATE SODIUM 100 MG/1
100 CAPSULE, LIQUID FILLED ORAL DAILY
COMMUNITY

## 2023-08-30 RX ORDER — IRON POLYSACCHARIDE COMPLEX 150 MG
150 CAPSULE ORAL DAILY
COMMUNITY

## 2023-08-30 RX ORDER — LANOLIN ALCOHOL/MO/W.PET/CERES
3000 CREAM (GRAM) TOPICAL EVERY OTHER DAY
COMMUNITY

## 2023-08-30 NOTE — PROGRESS NOTES
THIS DOCUMENT WAS MADE IN PART WITH VOICE RECOGNITION SOFTWARE.  OCCASIONALLY THIS SOFTWARE WILL MISINTERPRET WORDS OR PHRASES.      [facility] Care Provider Appointment   [facility]       Patient ID: Bhavani Hill is a 68 y.o. female.    ASSESSMENT/PLAN by Problem List:    1. Iron deficiency anemia, unspecified iron deficiency anemia type  Assessment & Plan:  She did consult with Heme-Onc.  Since she is responding although slowly to oral iron they recommended continuing conservative approach and she is in agreement.  Her stool test were negative but she still has a colonoscopy scheduled.  She also reported today that she is having occasional swallowing difficulties so I have requested an EGD.  I am not certain if they will be able to link this to her colonoscopy.    Orders:  -     Case Request Endoscopy: EGD (ESOPHAGOGASTRODUODENOSCOPY)    2. Essential hypertension  Overview:  since ~ 2000    Assessment & Plan:  This is a chronic condition.  This condition has been reviewed and evaluated and it is currently stable.        3. Hypothyroidism, unspecified type  Overview:  since 2008    Assessment & Plan:  Stable, continue synthroid      4. Dysphagia, unspecified type  -     Case Request Endoscopy: EGD (ESOPHAGOGASTRODUODENOSCOPY)    5. Abdominal pain, unspecified abdominal location  -     CT Abdomen Pelvis With Contrast; Future; Expected date: 08/30/2023  -     Basic Metabolic Panel; Future; Expected date: 08/30/2023    6. Generalized abdominal pain    7. Fullness of abdomen  -     CT Abdomen Pelvis With Contrast; Future; Expected date: 08/30/2023  -     Basic Metabolic Panel; Future; Expected date: 08/30/2023     She also reports some intermittent abdominal discomfort, fullness.  I did recommend that she adjust her fiber supplement.  But these symptoms are new, given this combined with her anemia I do recommend a CT scan as a precaution.    Follow Up:  After testing    Advance [facility]  Planning     Date: 08/30/2023    Living Will  Power of   I initiated the process of voluntary advance care planning today and explained the importance of this process to the patient.  I introduced the concept of advance directives to the patient, as well. Then the patient received detailed information about the importance of designating a Health Care Power of  (HCPOA). She was also instructed to communicate with this person about their wishes for future healthcare, should she become sick and lose decision-making capacity. The patient has not previously appointed a HCPOA. After our discussion, the patient has decided to complete a HCPOA and has appointed her significant other, health care agent:  Rip      She does have the information and is planning to complete this.  If she has any questions or needs additional assistance she will let me know             Subjective:     Chief Complaint   Patient presents with    Dysphagia     I have reviewed the information entered by the ancillary staff regarding the chief complaint as well as the related history.    HPI    Patient is a/an 68 y.o.  female     Hypertension  130s/80 at home, blood pressure does tend to be elevated here but still reasonable today.  Recommend continuing monitoring, healthy diet, exercise.      She did consult with Heme-Onc regarding iron-deficiency anemia.  See above for details.      She does report new abdominal symptoms including feeling of fullness some generalized discomfort that comes and goes.  She does not really feel that she is constipated.  She does take the iron supplement.  She does take a fiber gummy but does not necessarily in just water at the same time.  Encouraged her to use a soluble fiber supplement and drink plenty of water.      She also reports occasionally now having trouble swallowing food seeming to get stuck in the throat.  She does not complain of heartburn or reflux.    For complete problem list, past  medical history, surgical history, social history, etc., see appropriate section in the electronic medical record    Review of Systems   Constitutional:  Negative for activity change and unexpected weight change.   HENT:  Positive for trouble swallowing. Negative for hearing loss and rhinorrhea.    Eyes:  Negative for discharge and visual disturbance.   Respiratory:  Negative for chest tightness and wheezing.    Cardiovascular:  Negative for chest pain and palpitations.   Gastrointestinal:  Negative for blood in stool, constipation, diarrhea and vomiting.   Endocrine: Negative for polydipsia and polyuria.   Genitourinary:  Positive for hematuria. Negative for difficulty urinating, dysuria and menstrual problem.   Musculoskeletal:  Negative for arthralgias, joint swelling and neck pain.   Neurological:  Negative for weakness and headaches.   Psychiatric/Behavioral:  Negative for confusion and dysphoric mood.        Objective     Physical Exam  Vitals reviewed.   Constitutional:       General: She is not in acute distress.     Appearance: She is well-developed. She is not ill-appearing.   HENT:      Head: Normocephalic and atraumatic.   Eyes:      General: No scleral icterus.     Conjunctiva/sclera: Conjunctivae normal.   Cardiovascular:      Rate and Rhythm: Normal rate and regular rhythm.      Heart sounds: Normal heart sounds. No murmur heard.  Pulmonary:      Effort: Pulmonary effort is normal. No respiratory distress.      Breath sounds: Normal breath sounds. No wheezing or rales.   Abdominal:      Comments: Abdomen is soft, nontender, bowel sounds are present though somewhat hypoactive.  No discrete mass though perhaps some generalized fullness.   Skin:     General: Skin is dry.      Findings: No rash.   Neurological:      Mental Status: She is alert and oriented to person, place, and time.   Psychiatric:         Behavior: Behavior normal.       Vitals:    08/30/23 1319 08/30/23 1342   BP: (!) 152/84 134/84   BP  "Location: Left arm Right arm   Patient Position: Sitting Sitting   BP Method: Medium (Manual) Medium (Manual)   Pulse: 65    Resp: 17    Temp: 98.4 °F (36.9 °C)    TempSrc: Oral    SpO2: 98%    Weight: 73.3 kg (161 lb 9.6 oz)    Height: 5' 4" (1.626 m)        "

## 2023-08-30 NOTE — ASSESSMENT & PLAN NOTE
She did consult with Heme-Onc.  Since she is responding although slowly to oral iron they recommended continuing conservative approach and she is in agreement.  Her stool test were negative but she still has a colonoscopy scheduled.  She also reported today that she is having occasional swallowing difficulties so I have requested an EGD.  I am not certain if they will be able to link this to her colonoscopy.  
Stable, continue synthroid  
This is a chronic condition.  This condition has been reviewed and evaluated and it is currently stable.    
(2) potential problem

## 2023-08-31 ENCOUNTER — TELEPHONE (OUTPATIENT)
Dept: GASTROENTEROLOGY | Facility: CLINIC | Age: 68
End: 2023-08-31
Payer: MEDICARE

## 2023-08-31 NOTE — TELEPHONE ENCOUNTER
I call Mrs. Hill to schedule a colonoscopy/EGD as ordered by primary care physician. Patient doesn't answer. I leave patient a brief voicemail to call back. [facility] message will be sent if active.

## 2023-08-31 NOTE — TELEPHONE ENCOUNTER
EGD is scheduled on 10/24 with Dr. Clarke at [address] [facility] [address] in [place]. After our Pre-service team gets the green light from your insurance, the Preop Nurse will call a couple of days before your procedure date with the arrival time.  Prep instructions has been sent via [facility] and via mail. Address is confirmed. Patient is informed the preop Nurse will call him/her with the arrival time a day or two prior to procedure. Patient verbalizes understanding.

## 2023-09-11 ENCOUNTER — HOSPITAL ENCOUNTER (OUTPATIENT)
Dept: RADIOLOGY | Facility: HOSPITAL | Age: 68
Discharge: HOME | End: 2023-09-11
Attending: FAMILY MEDICINE
Payer: MEDICARE

## 2023-09-11 DIAGNOSIS — R10.9 ABDOMINAL PAIN, UNSPECIFIED ABDOMINAL LOCATION: ICD-10-CM

## 2023-09-11 DIAGNOSIS — R19.8 FULLNESS OF ABDOMEN: ICD-10-CM

## 2023-09-11 PROCEDURE — 74177 CT ABD & PELVIS W/CONTRAST: CPT | Mod: 26,,, | Performed by: RADIOLOGY

## 2023-09-11 PROCEDURE — 25500020 PHARM REV CODE 255: Mod: PO | Performed by: FAMILY MEDICINE

## 2023-09-11 PROCEDURE — 74177 CT ABDOMEN PELVIS WITH CONTRAST: ICD-10-PCS | Mod: 26,,, | Performed by: RADIOLOGY

## 2023-09-11 PROCEDURE — A9698 NON-RAD CONTRAST MATERIALNOC: HCPCS | Mod: PO | Performed by: FAMILY MEDICINE

## 2023-09-11 PROCEDURE — 74177 CT ABD & PELVIS W/CONTRAST: CPT | Mod: TC,PO

## 2023-09-11 RX ADMIN — IOHEXOL 1000 ML: 9 SOLUTION ORAL at 03:09

## 2023-09-11 RX ADMIN — IOHEXOL 75 ML: 350 INJECTION, SOLUTION INTRAVENOUS at 03:09

## 2023-09-12 ENCOUNTER — TELEPHONE (OUTPATIENT)
Dept: PRIMARY CARE CLINIC | Facility: CLINIC | Age: 68
End: 2023-09-12
Payer: MEDICARE

## 2023-09-12 ENCOUNTER — TELEPHONE (OUTPATIENT)
Dept: OBSTETRICS AND GYNECOLOGY | Facility: CLINIC | Age: 68
End: 2023-09-12
Payer: MEDICARE

## 2023-09-12 ENCOUNTER — TELEPHONE (OUTPATIENT)
Dept: PRIMARY CARE CLINIC | Facility: CLINIC | Age: 68
End: 2023-09-12

## 2023-09-12 DIAGNOSIS — K76.9 HEPATIC LESION: ICD-10-CM

## 2023-09-12 DIAGNOSIS — R19.00 INTRA-ABDOMINAL AND PELVIC SWELLING, MASS AND LUMP, UNSPECIFIED SITE: Primary | ICD-10-CM

## 2023-09-12 DIAGNOSIS — N83.8 OVARIAN MASS: Primary | ICD-10-CM

## 2023-09-12 DIAGNOSIS — D73.89 SPLENIC LESION: ICD-10-CM

## 2023-09-12 DIAGNOSIS — N28.9 RENAL LESION: ICD-10-CM

## 2023-09-12 NOTE — TELEPHONE ENCOUNTER
Had a CT done yesterday with PCP for abdominal pain which has resolved.  An incidental finding of a multicyst on left ovary was found.  Denies VB or pain at this time.  Was told that PCP will fax results to our office.    Scheduled pt for US and visit.

## 2023-09-12 NOTE — TELEPHONE ENCOUNTER
Spoke with Nancy at Dr. Sanches's office and informed her that pt had a CT of the abdomen yesterday and that pt has a large ovarian mass.  Informed her that Dr. Wilkinson wants pt to be seen immediately for f/u.  Nancy reports that she will send this message to the nurse and have the nurse f/u with pt to schedule an appt.  Confirmed with Nancy that Dr. Sanches performs gynecological surgeries.     Called pt and LM that Dr. Sanches's office would be reaching out to her to schedule an appt.

## 2023-09-12 NOTE — TELEPHONE ENCOUNTER
LVM to return RN call.    Will offer appt tomorrow with US at 8:30 at [facility] and appt with Dr. Sanches after at 9:15.  If that time does not work for her, will offer another time tomorrow.

## 2023-09-12 NOTE — TELEPHONE ENCOUNTER
----- Message from Fabi Hughes MA sent at 9/12/2023 11:50 AM CDT -----  Patient called to let you know she scheduled an appointment with PRANAY Sarah, for 9/13/23. She has a 10a US and 1045a visit to review results.    # [phone]

## 2023-09-12 NOTE — TELEPHONE ENCOUNTER
Informed Dr. Wilkinson of pt's GYN appt tomorrow and he informed me that the MRI that he ordered can be scheduled for next week.  Pt scheduled for MRI on Sat, 9/23.

## 2023-09-12 NOTE — TELEPHONE ENCOUNTER
I spoke with the patient regarding the findings on the recent CT of the abdomen and pelvis.  We have already faxed the results to her gynecologist and she will follow their regarding the ovarian lesion.  However there are other abdominal lesions that need evaluation so please proceed with the abdominal MRI.  She is aware that we will be contacting her for this additional test.  This may or may not provide more information on the ovarian lesion but if possible let us try to get this set up before she follows with her gynecologist.

## 2023-09-13 ENCOUNTER — LAB VISIT (OUTPATIENT)
Dept: LAB | Facility: OTHER | Age: 68
End: 2023-09-13
Attending: OBSTETRICS & GYNECOLOGY
Payer: MEDICARE

## 2023-09-13 ENCOUNTER — OFFICE VISIT (OUTPATIENT)
Dept: OBSTETRICS AND GYNECOLOGY | Facility: CLINIC | Age: 68
End: 2023-09-13
Attending: OBSTETRICS & GYNECOLOGY
Payer: MEDICARE

## 2023-09-13 VITALS — HEIGHT: 64 IN | BODY MASS INDEX: 27.74 KG/M2 | SYSTOLIC BLOOD PRESSURE: 118 MMHG | DIASTOLIC BLOOD PRESSURE: 86 MMHG

## 2023-09-13 DIAGNOSIS — N83.8 OVARIAN MASS, LEFT: ICD-10-CM

## 2023-09-13 DIAGNOSIS — D39.12 NEOPLASM OF UNCERTAIN BEHAVIOR OF LEFT OVARY: ICD-10-CM

## 2023-09-13 DIAGNOSIS — D39.12 NEOPLASM OF UNCERTAIN BEHAVIOR OF LEFT OVARY: Primary | ICD-10-CM

## 2023-09-13 LAB — CANCER AG125 SERPL-ACNC: 19 U/ML (ref 0–30)

## 2023-09-13 PROCEDURE — 4010F PR ACE/ARB THEARPY RXD/TAKEN: ICD-10-PCS | Mod: CPTII,S$GLB,, | Performed by: OBSTETRICS & GYNECOLOGY

## 2023-09-13 PROCEDURE — 1101F PR PT FALLS ASSESS DOC 0-1 FALLS W/OUT INJ PAST YR: ICD-10-PCS | Mod: CPTII,S$GLB,, | Performed by: OBSTETRICS & GYNECOLOGY

## 2023-09-13 PROCEDURE — 86304 IMMUNOASSAY TUMOR CA 125: CPT | Performed by: OBSTETRICS & GYNECOLOGY

## 2023-09-13 PROCEDURE — 1126F AMNT PAIN NOTED NONE PRSNT: CPT | Mod: CPTII,S$GLB,, | Performed by: OBSTETRICS & GYNECOLOGY

## 2023-09-13 PROCEDURE — 1160F RVW MEDS BY RX/DR IN RCRD: CPT | Mod: CPTII,S$GLB,, | Performed by: OBSTETRICS & GYNECOLOGY

## 2023-09-13 PROCEDURE — 3074F SYST BP LT 130 MM HG: CPT | Mod: CPTII,S$GLB,, | Performed by: OBSTETRICS & GYNECOLOGY

## 2023-09-13 PROCEDURE — 1159F MED LIST DOCD IN RCRD: CPT | Mod: CPTII,S$GLB,, | Performed by: OBSTETRICS & GYNECOLOGY

## 2023-09-13 PROCEDURE — 3079F PR MOST RECENT DIASTOLIC BLOOD PRESSURE 80-89 MM HG: ICD-10-PCS | Mod: CPTII,S$GLB,, | Performed by: OBSTETRICS & GYNECOLOGY

## 2023-09-13 PROCEDURE — 3079F DIAST BP 80-89 MM HG: CPT | Mod: CPTII,S$GLB,, | Performed by: OBSTETRICS & GYNECOLOGY

## 2023-09-13 PROCEDURE — 1101F PT FALLS ASSESS-DOCD LE1/YR: CPT | Mod: CPTII,S$GLB,, | Performed by: OBSTETRICS & GYNECOLOGY

## 2023-09-13 PROCEDURE — 3288F PR FALLS RISK ASSESSMENT DOCUMENTED: ICD-10-PCS | Mod: CPTII,S$GLB,, | Performed by: OBSTETRICS & GYNECOLOGY

## 2023-09-13 PROCEDURE — 99999 PR PBB SHADOW E&M-EST. PATIENT-LVL III: CPT | Mod: PBBFAC,,, | Performed by: OBSTETRICS & GYNECOLOGY

## 2023-09-13 PROCEDURE — 36415 COLL VENOUS BLD VENIPUNCTURE: CPT | Performed by: OBSTETRICS & GYNECOLOGY

## 2023-09-13 PROCEDURE — 3074F PR MOST RECENT SYSTOLIC BLOOD PRESSURE < 130 MM HG: ICD-10-PCS | Mod: CPTII,S$GLB,, | Performed by: OBSTETRICS & GYNECOLOGY

## 2023-09-13 PROCEDURE — 99214 PR OFFICE/OUTPT VISIT, EST, LEVL IV, 30-39 MIN: ICD-10-PCS | Mod: S$GLB,,, | Performed by: OBSTETRICS & GYNECOLOGY

## 2023-09-13 PROCEDURE — 1160F PR REVIEW ALL MEDS BY PRESCRIBER/CLIN PHARMACIST DOCUMENTED: ICD-10-PCS | Mod: CPTII,S$GLB,, | Performed by: OBSTETRICS & GYNECOLOGY

## 2023-09-13 PROCEDURE — 3288F FALL RISK ASSESSMENT DOCD: CPT | Mod: CPTII,S$GLB,, | Performed by: OBSTETRICS & GYNECOLOGY

## 2023-09-13 PROCEDURE — 99214 OFFICE O/P EST MOD 30 MIN: CPT | Mod: S$GLB,,, | Performed by: OBSTETRICS & GYNECOLOGY

## 2023-09-13 PROCEDURE — 99999 PR PBB SHADOW E&M-EST. PATIENT-LVL III: ICD-10-PCS | Mod: PBBFAC,,, | Performed by: OBSTETRICS & GYNECOLOGY

## 2023-09-13 PROCEDURE — 4010F ACE/ARB THERAPY RXD/TAKEN: CPT | Mod: CPTII,S$GLB,, | Performed by: OBSTETRICS & GYNECOLOGY

## 2023-09-13 PROCEDURE — 3008F PR BODY MASS INDEX (BMI) DOCUMENTED: ICD-10-PCS | Mod: CPTII,S$GLB,, | Performed by: OBSTETRICS & GYNECOLOGY

## 2023-09-13 PROCEDURE — 3008F BODY MASS INDEX DOCD: CPT | Mod: CPTII,S$GLB,, | Performed by: OBSTETRICS & GYNECOLOGY

## 2023-09-13 PROCEDURE — 1159F PR MEDICATION LIST DOCUMENTED IN MEDICAL RECORD: ICD-10-PCS | Mod: CPTII,S$GLB,, | Performed by: OBSTETRICS & GYNECOLOGY

## 2023-09-13 PROCEDURE — 1126F PR PAIN SEVERITY QUANTIFIED, NO PAIN PRESENT: ICD-10-PCS | Mod: CPTII,S$GLB,, | Performed by: OBSTETRICS & GYNECOLOGY

## 2023-09-13 NOTE — PROGRESS NOTES
Subjective:       Patient ID: Bhavani Hill is a 68 y.o. female.    Chief Complaint:  Ultrasound Follow Up  (Pelvic US - hx of ovarian cyst.)      History of Present Illness  - patient presents for u/s and visit due to incidental finding of 13 cm ovarian multi-cystic mass on CT. Had CT due to abdominal discomfort that patient has attributed to iron supplementation due to anemia. Felt fine in March at annual with me. In April, labs showed anemia. Patient was asymptomatic. She has hemorrhoids but denies any other bleeding. After taking iron for 12 weeks, patient developed upset stomach/pain in front of abdomen. It feels hard. Doesn't feel bloated. Was also constipated at that time. Iron was changed to a different formulation and added stool softener/fiber; constipation has resolved. Abdominal discomfort is better but still present. When sleeps on side, feels like the hardness in her abdomen shifts. Reports she doesn't feel good in the mornings (pressure/abdominal pain) until after she's voided.     Past Medical History:   Diagnosis Date    Essential (primary) hypertension     since ~     Family history of breast cancer     Mother at age 58, Sister age 35    H/O bone density study 2015    R hip: 0.20   //   L hip:  0.02   //  spine 2.46    [place]     History of cholelithiasis     Hypothyroidism     since     Menopause     Pap smear for cervical cancer screening 2015    Pap[place]Hpv negative  [place]        Past Surgical History:   Procedure Laterality Date     SECTION      x 2    CHOLECYSTECTOMY  2010        Family History   Problem Relation Age of Onset    Breast cancer Mother 58    Cancer Mother         Breast    Breast cancer Sister     Cancer Sister         Breast and ? biliary CA    Colon cancer Neg Hx     Ovarian cancer Neg Hx         Social History     Socioeconomic History    Marital status:      Spouse name: Eliel   Occupational History    Occupation: teacher   Tobacco Use    Smoking  "status: Never    Smokeless tobacco: Never   Substance and Sexual Activity    Alcohol use: Yes     Alcohol/week: 0.0 standard drinks of alcohol     Comment: 1 martini daily    Drug use: Never    Sexual activity: Yes     Birth control/protection: Post-menopausal           Objective:     Vitals:    09/13/23 1051   BP: 118/86   BP Location: Left arm   Patient Position: Sitting   BP Method: Medium (Manual)   Height: 5' 4" (1.626 m)       Physical Exam:   Constitutional: She appears well-developed and well-nourished. She is cooperative. No distress.             Abdominal: Soft. There is no abdominal tenderness.     Genitourinary:    Vagina and uterus normal.   There is no rash, tenderness or lesion on the right labia. There is no rash, tenderness or lesion on the left labia. Cervix is normal. Right adnexum displays no mass, no tenderness and no fullness. Left adnexum displays mass. Left adnexum displays no tenderness.    Genitourinary Comments: Firm mobile mass in left pelvis                 Neurological: She is alert.          Assessment/ Plan:     Orders Placed This Encounter    CANCER ANTIGEN 125       Bhavani was seen today for ultrasound follow up .    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 Awaiting u/s report from radiology.  - will discuss with patient and Dr. Whiteside once all resulted.    Follow up for schedule f/u after lab and imaging resulted.    As of April 1, 2021, the Cures Act has been passed nationally. This new law requires that all doctors progress notes, lab results, pathology reports and radiology reports be released IMMEDIATELY to the patient in the patient portal. That means that the results are released to you at the EXACT same time they are released to me. Therefore, with all of the patients that I have I am not able to reply to each patient " exactly when the results come in. So there will be a delay from when you see the results to when I see them and have time to come up with a response to send you. Also I only see these results when I am on the computer at work. So if the results come in over the weekend or after 5 pm of a work day, I will not see them until the next business day. As you can tell, this is a challenge as a physician to give every patient the quick response they hope for and deserve. So please be patient!   Thanks for your understanding and patience.

## 2023-09-15 ENCOUNTER — TELEPHONE (OUTPATIENT)
Dept: PRIMARY CARE CLINIC | Facility: CLINIC | Age: 68
End: 2023-09-15
Payer: MEDICARE

## 2023-09-15 ENCOUNTER — TELEPHONE (OUTPATIENT)
Dept: OBSTETRICS AND GYNECOLOGY | Facility: CLINIC | Age: 68
End: 2023-09-15
Payer: MEDICARE

## 2023-09-15 NOTE — TELEPHONE ENCOUNTER
----- Message from Kaci Eckert sent at 9/14/2023  2:00 PM CDT -----  Regarding: feed back  [phone] - call back ; wanted to speak with Mary and give her feed back about recent tests and visits to the different doctors that she was referred.      Kaci

## 2023-09-15 NOTE — TELEPHONE ENCOUNTER
Spoke with pt, she wanted to update Dr. Wilkinson to her updated CT scan, lab results, possible surgery.  Dr. Wilkinson updated.  Pt concerned about having surgery on the [facility] and would like to have surgery on the [facility].  Dr. Wilkinson advised for pt to consult with Dr. Sanches regarding this concern, pt verbalized understanding.

## 2023-09-15 NOTE — TELEPHONE ENCOUNTER
9/15/23 @ [id number] Returned pt's call. Pt and  have some concerns regarding any surgery she may need. [place] surgery on the [facility]. They asked if Dr Sanches could call them as her  has few more questions. I informed them she is out of the office today and will be back Monday. Pt and  verbalizes understanding.

## 2023-09-18 ENCOUNTER — TELEPHONE (OUTPATIENT)
Dept: GYNECOLOGIC ONCOLOGY | Facility: CLINIC | Age: 68
End: 2023-09-18
Payer: MEDICARE

## 2023-09-18 ENCOUNTER — LAB VISIT (OUTPATIENT)
Dept: LAB | Facility: HOSPITAL | Age: 68
End: 2023-09-18
Payer: MEDICARE

## 2023-09-18 DIAGNOSIS — D50.9 IRON DEFICIENCY ANEMIA, UNSPECIFIED IRON DEFICIENCY ANEMIA TYPE: ICD-10-CM

## 2023-09-18 LAB
BASOPHILS # BLD AUTO: 0.04 K/UL (ref 0–0.2)
BASOPHILS NFR BLD: 0.6 % (ref 0–1.9)
DIFFERENTIAL METHOD: ABNORMAL
EOSINOPHIL # BLD AUTO: 0.1 K/UL (ref 0–0.5)
EOSINOPHIL NFR BLD: 1.3 % (ref 0–8)
ERYTHROCYTE [DISTWIDTH] IN BLOOD BY AUTOMATED COUNT: 18.1 % (ref 11.5–14.5)
FERRITIN SERPL-MCNC: 26 NG/ML (ref 20–300)
HCT VFR BLD AUTO: 39.7 % (ref 37–48.5)
HGB BLD-MCNC: 13.1 G/DL (ref 12–16)
IMM GRANULOCYTES # BLD AUTO: 0.01 K/UL (ref 0–0.04)
IMM GRANULOCYTES NFR BLD AUTO: 0.2 % (ref 0–0.5)
LYMPHOCYTES # BLD AUTO: 1.7 K/UL (ref 1–4.8)
LYMPHOCYTES NFR BLD: 27.6 % (ref 18–48)
MCH RBC QN AUTO: 27.7 PG (ref 27–31)
MCHC RBC AUTO-ENTMCNC: 33 G/DL (ref 32–36)
MCV RBC AUTO: 84 FL (ref 82–98)
MONOCYTES # BLD AUTO: 0.6 K/UL (ref 0.3–1)
MONOCYTES NFR BLD: 10.2 % (ref 4–15)
NEUTROPHILS # BLD AUTO: 3.8 K/UL (ref 1.8–7.7)
NEUTROPHILS NFR BLD: 60.1 % (ref 38–73)
NRBC BLD-RTO: 0 /100 WBC
PLATELET # BLD AUTO: 199 K/UL (ref 150–450)
PMV BLD AUTO: 10.3 FL (ref 9.2–12.9)
RBC # BLD AUTO: 4.73 M/UL (ref 4–5.4)
WBC # BLD AUTO: 6.3 K/UL (ref 3.9–12.7)

## 2023-09-18 PROCEDURE — 36415 COLL VENOUS BLD VENIPUNCTURE: CPT | Mod: PO | Performed by: INTERNAL MEDICINE

## 2023-09-18 PROCEDURE — 85025 COMPLETE CBC W/AUTO DIFF WBC: CPT | Mod: PO | Performed by: INTERNAL MEDICINE

## 2023-09-18 PROCEDURE — 82728 ASSAY OF FERRITIN: CPT | Performed by: INTERNAL MEDICINE

## 2023-09-18 NOTE — TELEPHONE ENCOUNTER
----- Message from Elizeer Zhou sent at 9/18/2023 11:15 AM CDT -----      Name of Who is Calling: NATHALIE LAYTON [[id number]]      What is the request in detail: Pt called to get scheduled for an appt was referred.Please contact to further discuss and advise.          Can the clinic reply by [facility]: Y      What Number [facility] Back [facility]: [phone]

## 2023-09-18 NOTE — TELEPHONE ENCOUNTER
Spoke with patient. Discussed need for consult to see where/when/how surgery can be performed. She will call Dr. Whiteside' office to schedule.

## 2023-09-19 ENCOUNTER — TELEPHONE (OUTPATIENT)
Dept: GYNECOLOGIC ONCOLOGY | Facility: CLINIC | Age: 68
End: 2023-09-19
Payer: MEDICARE

## 2023-09-19 NOTE — TELEPHONE ENCOUNTER
Spoke with our patient about her schedule appointment she voiced understanding of the date, time and location. All questions answered appointment mail. Provider Scheduling Coord.  Gynecologic Oncology MA/PAR /Preceptor Juan Maloney

## 2023-09-19 NOTE — PROGRESS NOTES
REFERRING PROVIDER  Ernestina Sanches, *     HISTORY OF PRESENT CONDITION  Chief complaint: right ovarian mass  Bhavani Hill is a 68 y.o. who presents in consultation for an opinion regarding a right ovarian mass with a CA-125 of 19.     +PO. -N/V. +Flatus. +BM. -VB, VD, changes in stool or urine. -Abdominal or pelvic pain. -Unintentional weight loss.      REVIEW OF SYSTEMS  All systems reviewed and negative except as noted in HPI.    OBJECTIVE   Vitals:    09/26/23 1406   BP: 133/75   Pulse: 63      Body mass index is 27.32 kg/m².      1. General: Well appearing, no apparent distress, alert and oriented.  2. Lymph: Neck symmetric without cervical or supraclavicular adenopathy or mass.  3. Lungs: Normal respiratory rate, no accessory muscle use.  4. Cardiac: Normal rate  5. Psych: Normal affect.  6. Abdomen:  non-distended, soft, non-tender, is a mass that is at the level of the umbilicus with full mobility, no ascites, no hepatosplenomegaly.  7. Skin: Warm, dry, no rashes or lesions.   8. Extremities: Bilateral lower extremities without edema or tenderness.  9. Genitourinary: Deferred      ECOG status: 0    LABORATORY DATA  Lab data reviewed.    RADIOLOGICAL DATA  Radiology data reviewed.    PATHOLOGY DATA  Pathology data reviewed.    ASSESSMENT / PLAN     1. Adnexal mass    2. Essential hypertension    3. Hypothyroidism, unspecified type    4. Other abnormal tumor markers         F/U CEA,     9/11/23: CT A/P w/: - Omental caking, lymphadenopathy, or ascites. 16 cm complex adnexal mass  9/12/23: Pelvic US:  9/13/23: CA-125 = 19  9/18/23: Hb 13.1, Plt 199, Cr 0.8    PSH:   LTCS x2  Laparscopic cholecystectomy      I discussed with the patient that the primary treatment for an adnexal mass is observation versus surgical management.  Surgical management will depend on the use of frozen pathology.  Differential diagnoses includes a benign, borderline, or malignant mass.  I will plan to perform this in a open  fashion, however the patient is interested in a minimally invasive surgery.  We discussed that the safest option is an open surgery but if all her tumor markers are normal, it is reasonable to attempt it robotically.  We extensively discussed the risks of intraoperative rupture associated with a borderline and malignant tumor, including the risk of spread.  She will discuss further with her  and is willing to accept those risks if we proceed with a robotic surgery.  I also discussed with the patient the role of staging for borderline frozen pathology and debulking for ovarian cancer.  This may include a total abdominal hysterectomy, bilateral salpingo-oophorectomy, omentectomy, diaphragm stripping, bowel resection, splenectomy, and any other necessary procedures to remove all visible disease.  The procedure and its risks and benefits were discussed in detail.      PATIENT EDUCATION  Ready to learn, no apparent learning barriers were identified; learning preferences include listening.  Explained diagnosis and treatment plan; patient expressed understanding of the content.    INFORMED CONSENT  Discussed the risks, benefits, and alternatives of the procedure and of possible blood transfusion.  Discussed the necessity of other members of the healthcare team participating in the procedure.  All questions answered and consent given.      Gilberto Whiteside

## 2023-09-20 ENCOUNTER — TELEPHONE (OUTPATIENT)
Dept: HEMATOLOGY/ONCOLOGY | Facility: CLINIC | Age: 68
End: 2023-09-20
Payer: MEDICARE

## 2023-09-20 ENCOUNTER — PATIENT MESSAGE (OUTPATIENT)
Dept: HEMATOLOGY/ONCOLOGY | Facility: CLINIC | Age: 68
End: 2023-09-20
Payer: MEDICARE

## 2023-09-21 ENCOUNTER — PATIENT MESSAGE (OUTPATIENT)
Dept: HEMATOLOGY/ONCOLOGY | Facility: CLINIC | Age: 68
End: 2023-09-21
Payer: MEDICARE

## 2023-09-26 ENCOUNTER — OFFICE VISIT (OUTPATIENT)
Dept: GYNECOLOGIC ONCOLOGY | Facility: CLINIC | Age: 68
End: 2023-09-26
Payer: MEDICARE

## 2023-09-26 ENCOUNTER — LAB VISIT (OUTPATIENT)
Dept: LAB | Facility: HOSPITAL | Age: 68
End: 2023-09-26
Attending: OBSTETRICS & GYNECOLOGY
Payer: MEDICARE

## 2023-09-26 VITALS
HEIGHT: 64 IN | SYSTOLIC BLOOD PRESSURE: 133 MMHG | HEART RATE: 63 BPM | DIASTOLIC BLOOD PRESSURE: 75 MMHG | WEIGHT: 159.19 LBS | BODY MASS INDEX: 27.18 KG/M2

## 2023-09-26 DIAGNOSIS — N94.89 ADNEXAL MASS: ICD-10-CM

## 2023-09-26 DIAGNOSIS — N94.89 ADNEXAL MASS: Primary | ICD-10-CM

## 2023-09-26 DIAGNOSIS — E03.9 HYPOTHYROIDISM, UNSPECIFIED TYPE: Chronic | ICD-10-CM

## 2023-09-26 DIAGNOSIS — R97.8 OTHER ABNORMAL TUMOR MARKERS: ICD-10-CM

## 2023-09-26 DIAGNOSIS — I10 ESSENTIAL HYPERTENSION: Chronic | ICD-10-CM

## 2023-09-26 LAB — CEA SERPL-MCNC: 4 NG/ML (ref 0–5)

## 2023-09-26 PROCEDURE — 3075F SYST BP GE 130 - 139MM HG: CPT | Mod: CPTII,S$GLB,, | Performed by: OBSTETRICS & GYNECOLOGY

## 2023-09-26 PROCEDURE — 3008F PR BODY MASS INDEX (BMI) DOCUMENTED: ICD-10-PCS | Mod: CPTII,S$GLB,, | Performed by: OBSTETRICS & GYNECOLOGY

## 2023-09-26 PROCEDURE — 3288F PR FALLS RISK ASSESSMENT DOCUMENTED: ICD-10-PCS | Mod: CPTII,S$GLB,, | Performed by: OBSTETRICS & GYNECOLOGY

## 2023-09-26 PROCEDURE — 3078F DIAST BP <80 MM HG: CPT | Mod: CPTII,S$GLB,, | Performed by: OBSTETRICS & GYNECOLOGY

## 2023-09-26 PROCEDURE — 3288F FALL RISK ASSESSMENT DOCD: CPT | Mod: CPTII,S$GLB,, | Performed by: OBSTETRICS & GYNECOLOGY

## 2023-09-26 PROCEDURE — 82378 CARCINOEMBRYONIC ANTIGEN: CPT | Performed by: OBSTETRICS & GYNECOLOGY

## 2023-09-26 PROCEDURE — 3008F BODY MASS INDEX DOCD: CPT | Mod: CPTII,S$GLB,, | Performed by: OBSTETRICS & GYNECOLOGY

## 2023-09-26 PROCEDURE — 1126F AMNT PAIN NOTED NONE PRSNT: CPT | Mod: CPTII,S$GLB,, | Performed by: OBSTETRICS & GYNECOLOGY

## 2023-09-26 PROCEDURE — 3078F PR MOST RECENT DIASTOLIC BLOOD PRESSURE < 80 MM HG: ICD-10-PCS | Mod: CPTII,S$GLB,, | Performed by: OBSTETRICS & GYNECOLOGY

## 2023-09-26 PROCEDURE — 99999 PR PBB SHADOW E&M-EST. PATIENT-LVL III: ICD-10-PCS | Mod: PBBFAC,,, | Performed by: OBSTETRICS & GYNECOLOGY

## 2023-09-26 PROCEDURE — 86301 IMMUNOASSAY TUMOR CA 19-9: CPT | Performed by: OBSTETRICS & GYNECOLOGY

## 2023-09-26 PROCEDURE — 36415 COLL VENOUS BLD VENIPUNCTURE: CPT | Performed by: OBSTETRICS & GYNECOLOGY

## 2023-09-26 PROCEDURE — 1159F MED LIST DOCD IN RCRD: CPT | Mod: CPTII,S$GLB,, | Performed by: OBSTETRICS & GYNECOLOGY

## 2023-09-26 PROCEDURE — 99205 OFFICE O/P NEW HI 60 MIN: CPT | Mod: S$GLB,,, | Performed by: OBSTETRICS & GYNECOLOGY

## 2023-09-26 PROCEDURE — 1101F PR PT FALLS ASSESS DOC 0-1 FALLS W/OUT INJ PAST YR: ICD-10-PCS | Mod: CPTII,S$GLB,, | Performed by: OBSTETRICS & GYNECOLOGY

## 2023-09-26 PROCEDURE — 99999 PR PBB SHADOW E&M-EST. PATIENT-LVL III: CPT | Mod: PBBFAC,,, | Performed by: OBSTETRICS & GYNECOLOGY

## 2023-09-26 PROCEDURE — 1101F PT FALLS ASSESS-DOCD LE1/YR: CPT | Mod: CPTII,S$GLB,, | Performed by: OBSTETRICS & GYNECOLOGY

## 2023-09-26 PROCEDURE — 3075F PR MOST RECENT SYSTOLIC BLOOD PRESS GE 130-139MM HG: ICD-10-PCS | Mod: CPTII,S$GLB,, | Performed by: OBSTETRICS & GYNECOLOGY

## 2023-09-26 PROCEDURE — 99205 PR OFFICE/OUTPT VISIT, NEW, LEVL V, 60-74 MIN: ICD-10-PCS | Mod: S$GLB,,, | Performed by: OBSTETRICS & GYNECOLOGY

## 2023-09-26 PROCEDURE — 4010F ACE/ARB THERAPY RXD/TAKEN: CPT | Mod: CPTII,S$GLB,, | Performed by: OBSTETRICS & GYNECOLOGY

## 2023-09-26 PROCEDURE — 4010F PR ACE/ARB THEARPY RXD/TAKEN: ICD-10-PCS | Mod: CPTII,S$GLB,, | Performed by: OBSTETRICS & GYNECOLOGY

## 2023-09-26 PROCEDURE — 1159F PR MEDICATION LIST DOCUMENTED IN MEDICAL RECORD: ICD-10-PCS | Mod: CPTII,S$GLB,, | Performed by: OBSTETRICS & GYNECOLOGY

## 2023-09-26 PROCEDURE — 1126F PR PAIN SEVERITY QUANTIFIED, NO PAIN PRESENT: ICD-10-PCS | Mod: CPTII,S$GLB,, | Performed by: OBSTETRICS & GYNECOLOGY

## 2023-09-26 NOTE — Clinical Note
Very nice!  I think she will ultimately agree to open but will discuss further with her , and we'll make a decision next week!

## 2023-09-27 LAB — CANCER AG19-9 SERPL-ACNC: 1860.2 U/ML (ref 0–40)

## 2023-10-03 ENCOUNTER — TELEPHONE (OUTPATIENT)
Dept: GYNECOLOGIC ONCOLOGY | Facility: CLINIC | Age: 68
End: 2023-10-03
Payer: MEDICARE

## 2023-10-03 DIAGNOSIS — N94.89 ADNEXAL MASS: Primary | ICD-10-CM

## 2023-10-03 RX ORDER — HEPARIN SODIUM 5000 [USP'U]/ML
5000 INJECTION, SOLUTION INTRAVENOUS; SUBCUTANEOUS ONCE
Status: CANCELLED | OUTPATIENT
Start: 2023-10-03 | End: 2023-10-03

## 2023-10-03 RX ORDER — LIDOCAINE HYDROCHLORIDE 10 MG/ML
1 INJECTION, SOLUTION EPIDURAL; INFILTRATION; INTRACAUDAL; PERINEURAL ONCE
Status: CANCELLED | OUTPATIENT
Start: 2023-10-03 | End: 2023-10-03

## 2023-10-03 RX ORDER — GABAPENTIN 100 MG/1
300 CAPSULE ORAL ONCE
Status: CANCELLED | OUTPATIENT
Start: 2023-10-03 | End: 2023-10-03

## 2023-10-03 RX ORDER — CELECOXIB 100 MG/1
200 CAPSULE ORAL ONCE
Status: CANCELLED | OUTPATIENT
Start: 2023-10-03 | End: 2023-10-03

## 2023-10-03 RX ORDER — ACETAMINOPHEN 325 MG/1
1000 TABLET ORAL ONCE
Status: CANCELLED | OUTPATIENT
Start: 2023-10-03 | End: 2023-10-03

## 2023-10-03 NOTE — PROGRESS NOTES
"  Name: Bhavani Hill  MRN:  [id number]  :  1955 Age 68 y.o.  Date of Service: 10/4/2023    Reason for visit:  Bhavani Hill is a 68 y.o. female here regarding DEREJE.    Hematology History/History of Present Illness:   Patient of Dr. Forde, per his note:     "Mrs Hill is a 68-year-old female referred for iron deficiency anemia.  The most recent labs are from 2023 and they are as follows:  Ferritin was 14 ng/mL  WBCs were 4,800 per cubic mm, hemoglobin 11.6 grams/deciliter, hematocrit 36.1%, MCV 79, and platelets 223 K.  A CBC on 2023 showed a white count of 5300 per cubic mm, hemoglobin 10.6 grams/deciliter, hematocrit 34.2%, MCV 82 and platelets 240 K.  On the same day the ferritin was 9 ng/mL, while 3 stool samples that she submitted were negative for occult blood  A CBC on 2023 had shown a white count of 5400 per cubic mm, hemoglobin 11.6 grams/deciliter, hematocrit 36.1%, MCV 89 and platelets 197 K.  A CBC on May 19, 2022 had shown a white count of 4500 per cubic mm, hemoglobin 12.7 grams/deciliter, hematocrit 39.9%, MCV 85 and platelets 234 K.  She is referred for evaluation.  Other pertinent labs include a ferritin of 10 ng/mL.  On April 3, 2023  Three stool samples she has submitted in mid  were negative for occult blood.     She states that she was prescribed an oral formulation of iron in early  which she did not tolerate well, and she was switched to a different formulation that she can tolerate better."     PAST MEDICAL HISTORY:  Significant for hypertension, anxiety, and hypothyroidism  PAST SURGICAL HISTORY:  Significant for 2 C sections and a cholecystectomy  SOCIAL HISTORY:  She is a retired teacher.  She is .  She does not smoke but she does drink a martini every night  FAMILY HISTORY:  Mother had breast cancer and so did 1 of her sisters    Interval Hx:   Tried oral iron which caused GI side effects, constipation + hemorrhoids.   Had bloating with fiber    Past Medical " History:   Diagnosis Date    Essential (primary) hypertension     since ~     Family history of breast cancer     Mother at age 58, Sister age 35    H/O bone density study 2015    R hip: 0.20   //   L hip:  0.02   //  spine 2.46    ([facility])     History of cholelithiasis     Hypothyroidism     since     Menopause     Pap smear for cervical cancer screening 2015    Pap/Hpv negative  ([facility])        Past Surgical History:   Procedure Laterality Date     SECTION      x 2    CHOLECYSTECTOMY  2010       Allergies as of 10/04/2023 - Reviewed 10/03/2023   Allergen Reaction Noted    Trazodone Other (See Comments) 2023    Guaifenesin  2019       Family History   Problem Relation Age of Onset    Breast cancer Mother 58    Cancer Mother         Breast    Breast cancer Sister     Cancer Sister         Breast and ? biliary CA    Colon cancer Neg Hx     Ovarian cancer Neg Hx        Social History     Tobacco Use    Smoking status: Never    Smokeless tobacco: Never   Substance Use Topics    Alcohol use: Yes     Alcohol/week: 0.0 standard drinks of alcohol     Comment: 1 martini daily    Drug use: Never         PHYSICAL EXAMINATION:  There were no vitals taken for this visit.  Wt Readings from Last 3 Encounters:   23 72.2 kg (159 lb 2.8 oz)   23 73.3 kg (161 lb 9.6 oz)   08/10/23 74.3 kg (163 lb 12.8 oz)     ECOG PERFORMANCE STATUS: 1  Physical Exam   General:  Well-appearing, nontoxic  Eyes:  Equal and round pupils, EOMI, no scleral icterus  Mouth:  No lesions, moist  Cardiovascular:  Warm, well-perfused, no peripheral edema  Lungs:  Unlabored on room air, no wheezing  Neurologic:  Awake, alert and oriented, participating in the exam  Psych:  Appropriate mood and affect  Skin:  Normal pallor, No rashes  Heme:  No petechiae, no purpura      LABORATORY:  CBC  Lab Results   Component Value Date    WBC 6.30 2023    HGB 13.1 2023    HCT 39.7 2023    MCV 84 2023      09/18/2023         BMP  Lab Results   Component Value Date     09/11/2023    K 3.8 09/11/2023     09/11/2023    CO2 28 09/11/2023    BUN 11 09/11/2023    CREATININE 0.8 09/11/2023    CALCIUM 9.7 09/11/2023    ANIONGAP 11 09/11/2023    ESTGFRAFRICA >60.0 05/19/2022    EGFRNONAA >60.0 05/19/2022         PATHOLOGY:      RADIOLOGY:      ASSESSMENT AND PLAN:  Bhavani Hill is a 68 y.o. female with...    # iron-deficiency anemia  -was supposed to have EGD and colonoscopy coming up but was discovered to have a large ovarian mass and needs to have this surgically removed, deferring EGD and colonoscopy at this time  -her cytopenias have resolved with oral iron, she reports she will not take oral iron in the future could because she had abundant GI side effects  -should she need iron at her next visit we will discuss IV iron treatments, need to follow up colonoscopy and EGD    # ovarian mass  -being monitored by gyn Oncology, elevated CA 19 9 however no pancreatic lesions appreciated  -we will follow up path    # hypertension controlled 132/84, per primary care      Tanya Perez M.D.  Hematology/Oncology     Route Chart for Scheduling    Med Onc Chart Routing      Follow up with physician . Follow up in 5 month   Follow up with [facility]    Infusion scheduling note    Injection scheduling note    Labs CBC, CMP, ferritin and iron and TIBC   Scheduling:  Preferred lab:  Lab interval:  collect all labs in 3-4 days prior to MD visit in 5 months   Imaging    Pharmacy appointment    Other referrals

## 2023-10-04 ENCOUNTER — OFFICE VISIT (OUTPATIENT)
Dept: HEMATOLOGY/ONCOLOGY | Facility: CLINIC | Age: 68
End: 2023-10-04
Payer: MEDICARE

## 2023-10-04 VITALS
WEIGHT: 153.88 LBS | TEMPERATURE: 97 F | BODY MASS INDEX: 26.27 KG/M2 | DIASTOLIC BLOOD PRESSURE: 84 MMHG | HEART RATE: 77 BPM | SYSTOLIC BLOOD PRESSURE: 132 MMHG | HEIGHT: 64 IN | OXYGEN SATURATION: 95 % | RESPIRATION RATE: 18 BRPM

## 2023-10-04 DIAGNOSIS — D50.9 IRON DEFICIENCY ANEMIA, UNSPECIFIED IRON DEFICIENCY ANEMIA TYPE: Primary | ICD-10-CM

## 2023-10-04 DIAGNOSIS — N83.8 MASS, OVARIAN: ICD-10-CM

## 2023-10-04 DIAGNOSIS — R97.8 ELEVATED CA 19-9 LEVEL: ICD-10-CM

## 2023-10-04 DIAGNOSIS — I10 ESSENTIAL HYPERTENSION: Chronic | ICD-10-CM

## 2023-10-04 PROCEDURE — 3008F PR BODY MASS INDEX (BMI) DOCUMENTED: ICD-10-PCS | Mod: CPTII,S$GLB,, | Performed by: INTERNAL MEDICINE

## 2023-10-04 PROCEDURE — 99214 OFFICE O/P EST MOD 30 MIN: CPT | Mod: S$GLB,,, | Performed by: INTERNAL MEDICINE

## 2023-10-04 PROCEDURE — 3288F PR FALLS RISK ASSESSMENT DOCUMENTED: ICD-10-PCS | Mod: CPTII,S$GLB,, | Performed by: INTERNAL MEDICINE

## 2023-10-04 PROCEDURE — 3288F FALL RISK ASSESSMENT DOCD: CPT | Mod: CPTII,S$GLB,, | Performed by: INTERNAL MEDICINE

## 2023-10-04 PROCEDURE — 1101F PR PT FALLS ASSESS DOC 0-1 FALLS W/OUT INJ PAST YR: ICD-10-PCS | Mod: CPTII,S$GLB,, | Performed by: INTERNAL MEDICINE

## 2023-10-04 PROCEDURE — 1126F AMNT PAIN NOTED NONE PRSNT: CPT | Mod: CPTII,S$GLB,, | Performed by: INTERNAL MEDICINE

## 2023-10-04 PROCEDURE — 3079F PR MOST RECENT DIASTOLIC BLOOD PRESSURE 80-89 MM HG: ICD-10-PCS | Mod: CPTII,S$GLB,, | Performed by: INTERNAL MEDICINE

## 2023-10-04 PROCEDURE — 99999 PR PBB SHADOW E&M-EST. PATIENT-LVL IV: ICD-10-PCS | Mod: PBBFAC,,, | Performed by: INTERNAL MEDICINE

## 2023-10-04 PROCEDURE — 99214 PR OFFICE/OUTPT VISIT, EST, LEVL IV, 30-39 MIN: ICD-10-PCS | Mod: S$GLB,,, | Performed by: INTERNAL MEDICINE

## 2023-10-04 PROCEDURE — 3075F PR MOST RECENT SYSTOLIC BLOOD PRESS GE 130-139MM HG: ICD-10-PCS | Mod: CPTII,S$GLB,, | Performed by: INTERNAL MEDICINE

## 2023-10-04 PROCEDURE — 3075F SYST BP GE 130 - 139MM HG: CPT | Mod: CPTII,S$GLB,, | Performed by: INTERNAL MEDICINE

## 2023-10-04 PROCEDURE — 1126F PR PAIN SEVERITY QUANTIFIED, NO PAIN PRESENT: ICD-10-PCS | Mod: CPTII,S$GLB,, | Performed by: INTERNAL MEDICINE

## 2023-10-04 PROCEDURE — 3008F BODY MASS INDEX DOCD: CPT | Mod: CPTII,S$GLB,, | Performed by: INTERNAL MEDICINE

## 2023-10-04 PROCEDURE — 99999 PR PBB SHADOW E&M-EST. PATIENT-LVL IV: CPT | Mod: PBBFAC,,, | Performed by: INTERNAL MEDICINE

## 2023-10-04 PROCEDURE — 4010F ACE/ARB THERAPY RXD/TAKEN: CPT | Mod: CPTII,S$GLB,, | Performed by: INTERNAL MEDICINE

## 2023-10-04 PROCEDURE — 3079F DIAST BP 80-89 MM HG: CPT | Mod: CPTII,S$GLB,, | Performed by: INTERNAL MEDICINE

## 2023-10-04 PROCEDURE — 1101F PT FALLS ASSESS-DOCD LE1/YR: CPT | Mod: CPTII,S$GLB,, | Performed by: INTERNAL MEDICINE

## 2023-10-04 PROCEDURE — 4010F PR ACE/ARB THEARPY RXD/TAKEN: ICD-10-PCS | Mod: CPTII,S$GLB,, | Performed by: INTERNAL MEDICINE

## 2023-10-04 PROCEDURE — 1159F PR MEDICATION LIST DOCUMENTED IN MEDICAL RECORD: ICD-10-PCS | Mod: CPTII,S$GLB,, | Performed by: INTERNAL MEDICINE

## 2023-10-04 PROCEDURE — 1159F MED LIST DOCD IN RCRD: CPT | Mod: CPTII,S$GLB,, | Performed by: INTERNAL MEDICINE

## 2023-10-05 ENCOUNTER — TELEPHONE (OUTPATIENT)
Dept: GASTROENTEROLOGY | Facility: CLINIC | Age: 68
End: 2023-10-05
Payer: MEDICARE

## 2023-10-05 NOTE — TELEPHONE ENCOUNTER
Returned call to the patient, colonoscopy cancelled per patient request due to having a surgery soon.

## 2023-10-05 NOTE — TELEPHONE ENCOUNTER
----- Message from Elaine Goodrich LPN sent at 10/5/2023  1:40 PM CDT -----  Contact: pt    ----- Message -----  From: Kevin Marquez  Sent: 10/5/2023  11:57 AM CDT  To: Leon Syed Staff    Type: Needs Medical Advice  Who Called:  pt  Best Call Back Number: [phone]    Additional Information: Pt is calling the office needs to cancel procedure.Please call back and advise.

## 2023-10-05 NOTE — TELEPHONE ENCOUNTER
Returned call to the patient, patient states that she needs to cancel her procedure as she is having an abdominal surgery and will call back to get rescheduled once she heals from surgery, procedure cancelled per patient request.

## 2023-10-05 NOTE — TELEPHONE ENCOUNTER
----- Message from Kevin Marquez sent at 10/5/2023 11:56 AM CDT -----  Contact: pt  Type: Needs Medical Advice  Who Called:  pt  Best Call Back Number: [phone]    Additional Information: Pt is calling the office needs to cancel procedure.Please call back and advise.

## 2023-10-19 ENCOUNTER — HOSPITAL ENCOUNTER (OUTPATIENT)
Dept: PREADMISSION TESTING | Facility: OTHER | Age: 68
Discharge: HOME | End: 2023-10-19
Attending: OBSTETRICS & GYNECOLOGY
Payer: MEDICARE

## 2023-10-19 ENCOUNTER — ANESTHESIA EVENT (OUTPATIENT)
Dept: SURGERY | Facility: OTHER | Age: 68
DRG: 742 | End: 2023-10-19
Payer: MEDICARE

## 2023-10-19 VITALS
OXYGEN SATURATION: 100 % | HEART RATE: 69 BPM | HEIGHT: 64 IN | DIASTOLIC BLOOD PRESSURE: 89 MMHG | BODY MASS INDEX: 27.31 KG/M2 | SYSTOLIC BLOOD PRESSURE: 184 MMHG | WEIGHT: 160 LBS | RESPIRATION RATE: 19 BRPM | TEMPERATURE: 98 F

## 2023-10-19 DIAGNOSIS — Z01.818 PREOP TESTING: Primary | ICD-10-CM

## 2023-10-19 LAB
ABO + RH BLD: NORMAL
ANION GAP SERPL CALC-SCNC: 8 MMOL/L (ref 8–16)
BASOPHILS # BLD AUTO: 0.03 K/UL (ref 0–0.2)
BASOPHILS NFR BLD: 0.6 % (ref 0–1.9)
BLD GP AB SCN CELLS X3 SERPL QL: NORMAL
BUN SERPL-MCNC: 12 MG/DL (ref 8–23)
CALCIUM SERPL-MCNC: 9.4 MG/DL (ref 8.7–10.5)
CHLORIDE SERPL-SCNC: 104 MMOL/L (ref 95–110)
CO2 SERPL-SCNC: 27 MMOL/L (ref 23–29)
CREAT SERPL-MCNC: 0.8 MG/DL (ref 0.5–1.4)
DIFFERENTIAL METHOD: ABNORMAL
EOSINOPHIL # BLD AUTO: 0.1 K/UL (ref 0–0.5)
EOSINOPHIL NFR BLD: 1.1 % (ref 0–8)
ERYTHROCYTE [DISTWIDTH] IN BLOOD BY AUTOMATED COUNT: 14.6 % (ref 11.5–14.5)
EST. GFR  (NO RACE VARIABLE): >60 ML/MIN/1.73 M^2
GLUCOSE SERPL-MCNC: 90 MG/DL (ref 70–110)
HCT VFR BLD AUTO: 41.4 % (ref 37–48.5)
HGB BLD-MCNC: 13.7 G/DL (ref 12–16)
IMM GRANULOCYTES # BLD AUTO: 0.01 K/UL (ref 0–0.04)
IMM GRANULOCYTES NFR BLD AUTO: 0.2 % (ref 0–0.5)
LYMPHOCYTES # BLD AUTO: 1.3 K/UL (ref 1–4.8)
LYMPHOCYTES NFR BLD: 24.4 % (ref 18–48)
MCH RBC QN AUTO: 27.9 PG (ref 27–31)
MCHC RBC AUTO-ENTMCNC: 33.1 G/DL (ref 32–36)
MCV RBC AUTO: 84 FL (ref 82–98)
MONOCYTES # BLD AUTO: 0.5 K/UL (ref 0.3–1)
MONOCYTES NFR BLD: 9.7 % (ref 4–15)
NEUTROPHILS # BLD AUTO: 3.4 K/UL (ref 1.8–7.7)
NEUTROPHILS NFR BLD: 64 % (ref 38–73)
NRBC BLD-RTO: 0 /100 WBC
PLATELET # BLD AUTO: 206 K/UL (ref 150–450)
PMV BLD AUTO: 10.6 FL (ref 9.2–12.9)
POTASSIUM SERPL-SCNC: 3.7 MMOL/L (ref 3.5–5.1)
RBC # BLD AUTO: 4.91 M/UL (ref 4–5.4)
SODIUM SERPL-SCNC: 139 MMOL/L (ref 136–145)
SPECIMEN OUTDATE: NORMAL
WBC # BLD AUTO: 5.36 K/UL (ref 3.9–12.7)

## 2023-10-19 PROCEDURE — 86850 RBC ANTIBODY SCREEN: CPT | Performed by: ANESTHESIOLOGY

## 2023-10-19 PROCEDURE — 36415 COLL VENOUS BLD VENIPUNCTURE: CPT | Performed by: ANESTHESIOLOGY

## 2023-10-19 PROCEDURE — 85025 COMPLETE CBC W/AUTO DIFF WBC: CPT | Performed by: ANESTHESIOLOGY

## 2023-10-19 PROCEDURE — 80048 BASIC METABOLIC PNL TOTAL CA: CPT | Performed by: ANESTHESIOLOGY

## 2023-10-19 RX ORDER — SODIUM CHLORIDE, SODIUM LACTATE, POTASSIUM CHLORIDE, CALCIUM CHLORIDE 600; 310; 30; 20 MG/100ML; MG/100ML; MG/100ML; MG/100ML
INJECTION, SOLUTION INTRAVENOUS CONTINUOUS
Status: CANCELLED | OUTPATIENT
Start: 2023-10-19

## 2023-10-19 RX ORDER — LIDOCAINE HYDROCHLORIDE 10 MG/ML
0.5 INJECTION, SOLUTION EPIDURAL; INFILTRATION; INTRACAUDAL; PERINEURAL ONCE
Status: CANCELLED | OUTPATIENT
Start: 2023-10-19 | End: 2023-10-19

## 2023-10-19 RX ORDER — ACETAMINOPHEN 500 MG
1000 TABLET ORAL
Status: CANCELLED | OUTPATIENT
Start: 2023-10-19 | End: 2023-10-19

## 2023-10-19 NOTE — DISCHARGE INSTRUCTIONS
Information to Prepare you for your Surgery    PRE-ADMIT TESTING -  [phone]    [address]          Your surgery has been scheduled at [facility]. We are pleased to have the opportunity to serve you. For Further Information please call [phone].    On the day of surgery please report to the Information Desk on the 1st floor.    CONTACT YOUR PHYSICIAN'S OFFICE THE DAY PRIOR TO YOUR SURGERY TO OBTAIN YOUR ARRIVAL TIME.     The evening before surgery do not eat anything after 9 p.m. ( this includes hard candy, chewing gum and mints).  You may only have GATORADE, POWERADE AND WATER  from 9 p.m. until you leave your home.   DO NOT DRINK ANY LIQUIDS ON THE WAY TO THE HOSPITAL.      Why does your anesthesiologist allow you to drink Gatorade/Powerade before surgery?  Gatorade/Powerade helps to increase your comfort before surgery and to decrease your nausea after surgery. The carbohydrates in Gatorade/Powerade help reduce your body's stress response to surgery.  If you are a diabetic-drink only water prior to surgery.    Outpatient Surgery- May allow 2 adult Support Persons (1 being the designated ) for all surgical/procedural patients. A breastfeeding mother will be allowed her infant and 2 adult Support Persons.       SPECIAL MEDICATION INSTRUCTIONS: TAKE medications checked off by the Anesthesiologist on your Medication List.    Angiogram Patients: Take medications as instructed by your physician, including aspirin.     Surgery Patients:    If you take ASPIRIN - Your PHYSICIAN/SURGEON will need to inform you IF/OR when you need to stop taking aspirin prior to your surgery.     The week prior to surgery do not ot take any medications containing IBUPROFEN or NSAIDS ( Advil, Motrin, Goodys, BC, Aleve, Naproxen etc) If you are not sure if you should take a medicine please call your surgeon's office.  Ok to take Tylenol    Do Not Wear any  make-up (especially eye make-up) to surgery. Please remove any false eyelashes or eyelash extensions. If you arrive the day of surgery with makeup/eyelashes on you will be required to remove prior to surgery. (There is a risk of corneal abrasions if eye makeup/eyelash extensions are not removed)      Leave all valuables at home.   Do Not wear any jewelry or watches, including any metal in body piercings. Jewelry must be removed prior to coming to the hospital.  There is a possibility that rings that are unable to be removed may be cut off if they are on the surgical extremity.    Please remove all hair extensions, wigs, clips and any other metal accessories/ ornaments from your hair.  These items may pose a flammable/fire risk in Surgery and must be removed.    Do not shave your surgical area at least 5 days prior to your surgery. The surgical prep will be performed at the hospital according to Infection Control regulations.    Contact Lens must be removed before surgery. Either do not wear the contact lens or bring a case and solution for storage.  Please bring a container for eyeglasses or dentures as required.  Bring any paperwork your physician has provided, such as consent forms,  history and physicals, doctor's orders, etc.   Bring comfortable clothes that are loose fitting to wear upon discharge. Take into consideration the type of surgery being performed.  Maintain your diet as advised per your physician the day prior to surgery.      Adequate rest the night before surgery is advised.   Park in the Parking lot behind the hospital or in the [place] [facility] across the street from the parking lot. Parking is complimentary.  If you will be discharged the same day as your procedure, please arrange for a responsible adult to drive you home or to accompany you if traveling by taxi.   YOU WILL NOT BE PERMITTED TO DRIVE OR TO LEAVE THE HOSPITAL ALONE AFTER SURGERY.   If you are being discharged the same day,  it is strongly recommended that you arrange for someone to remain with you for the first 24 hrs following your surgery.    The Surgeon will speak to your family/visitor after your surgery regarding the outcome of your surgery and post op care.  The Surgeon may speak to you after your surgery, but there is a possibility you may not remember the details.  Please check with your family members regarding the conversation with the Surgeon.    We strongly recommend whoever is bringing you home be present for discharge instructions.  This will ensure a thorough understanding for your post op home care.          Thank you for your cooperation.  The Staff of [facility].            Bathing Instructions with Hibiclens    Shower the evening before and morning of your procedure with Chlorhexidine (Hibiclens)  do not use Chlorhexidine on your face or genitals. Do not get in your eyes.  Wash your face with water and your regular face wash/soap  Use your regular shampoo  Apply Chlorhexidine (Hibiclens) directly on your skin or on a wet washcloth and wash gently. When showering: Move away from the shower stream when applying Chlorhexidine (Hibiclens) to avoid rinsing off too soon.  Rinse thoroughly with warm water  Do not dilute Chlorhexidine (Hibiclens)   Dry off as usual, do not use any deodorant, powder, body lotions, perfume, after shave or cologne.

## 2023-10-19 NOTE — ANESTHESIA PREPROCEDURE EVALUATION
10/19/2023  Bhavani Hill is a 68 y.o., female.      Pre-op Assessment    I have reviewed the Patient Summary Reports.     I have reviewed the Nursing Notes. I have reviewed the NPO Status.   I have reviewed the Medications.     Review of Systems  Anesthesia Hx:  Denies Family Hx of Anesthesia complications.   Denies Personal Hx of Anesthesia complications.   Social:  Non-Smoker    Hematology/Oncology:         -- Anemia: Oncology Comments: Adnexal mass    Cardiovascular:   Hypertension    Pulmonary:  Pulmonary Normal    Renal/:  Renal/ Normal     Hepatic/GI:  Hepatic/GI Normal    Neurological:  Neurology Normal    Endocrine:   Hypothyroidism    Psych:   anxiety          Physical Exam  General: Well nourished, Cooperative, Alert and Oriented    Airway:  Mallampati: II   Mouth Opening: Normal  TM Distance: Normal  Tongue: Normal  Neck ROM: Normal ROM    Dental:  Intact, Caps / Implants        Anesthesia Plan  Type of Anesthesia, risks & benefits discussed:    Anesthesia Type: Gen ETT  Intra-op Monitoring Plan: Standard ASA Monitors  Post Op Pain Control Plan: multimodal analgesia  Induction:  IV  Airway Plan: Video, Post-Induction  Informed Consent: Informed consent signed with the Patient and all parties understand the risks and agree with anesthesia plan.  All questions answered.   ASA Score: 3  Anesthesia Plan Notes: Unclear etiology of adnexal mass. If malignancy, possible extensive surgery.  CBC, BMP, T&S    Ready For Surgery From Anesthesia Perspective.     .

## 2023-10-20 ENCOUNTER — TELEPHONE (OUTPATIENT)
Dept: GYNECOLOGIC ONCOLOGY | Facility: CLINIC | Age: 68
End: 2023-10-20
Payer: MEDICARE

## 2023-10-23 ENCOUNTER — ANESTHESIA (OUTPATIENT)
Dept: SURGERY | Facility: OTHER | Age: 68
DRG: 742 | End: 2023-10-23
Payer: MEDICARE

## 2023-10-23 ENCOUNTER — HOSPITAL ENCOUNTER (INPATIENT)
Facility: OTHER | Age: 68
LOS: 1 days | Discharge: HOME | DRG: 742 | End: 2023-10-24
Attending: OBSTETRICS & GYNECOLOGY | Admitting: OBSTETRICS & GYNECOLOGY
Payer: MEDICARE

## 2023-10-23 DIAGNOSIS — Z90.710 STATUS POST HYSTERECTOMY WITH OOPHORECTOMY: Primary | ICD-10-CM

## 2023-10-23 DIAGNOSIS — Z90.721 STATUS POST HYSTERECTOMY WITH OOPHORECTOMY: Primary | ICD-10-CM

## 2023-10-23 DIAGNOSIS — N94.89 ADNEXAL MASS: ICD-10-CM

## 2023-10-23 LAB — POCT GLUCOSE: 97 MG/DL (ref 70–110)

## 2023-10-23 PROCEDURE — D9220A PRA ANESTHESIA: ICD-10-PCS | Mod: ANES,,, | Performed by: ANESTHESIOLOGY

## 2023-10-23 PROCEDURE — 88307 PR  SURG PATH,LEVEL V: ICD-10-PCS | Mod: 26,,, | Performed by: STUDENT IN AN ORGANIZED HEALTH CARE EDUCATION/TRAINING PROGRAM

## 2023-10-23 PROCEDURE — 37000008 HC ANESTHESIA 1ST 15 MINUTES: Performed by: OBSTETRICS & GYNECOLOGY

## 2023-10-23 PROCEDURE — 63600175 PHARM REV CODE 636 W HCPCS: Performed by: NURSE ANESTHETIST, CERTIFIED REGISTERED

## 2023-10-23 PROCEDURE — 88305 TISSUE EXAM BY PATHOLOGIST: CPT | Mod: 26,,, | Performed by: PATHOLOGY

## 2023-10-23 PROCEDURE — 25000003 PHARM REV CODE 250: Performed by: OBSTETRICS & GYNECOLOGY

## 2023-10-23 PROCEDURE — 58150 PR TOTAL ABDOM HYSTERECTOMY: ICD-10-PCS | Mod: ,,, | Performed by: OBSTETRICS & GYNECOLOGY

## 2023-10-23 PROCEDURE — D9220A PRA ANESTHESIA: ICD-10-PCS | Mod: CRNA,,, | Performed by: NURSE ANESTHETIST, CERTIFIED REGISTERED

## 2023-10-23 PROCEDURE — 37000009 HC ANESTHESIA EA ADD 15 MINS: Performed by: OBSTETRICS & GYNECOLOGY

## 2023-10-23 PROCEDURE — 63600175 PHARM REV CODE 636 W HCPCS: Performed by: OBSTETRICS & GYNECOLOGY

## 2023-10-23 PROCEDURE — 25000003 PHARM REV CODE 250: Performed by: ANESTHESIOLOGY

## 2023-10-23 PROCEDURE — 63600175 PHARM REV CODE 636 W HCPCS: Performed by: STUDENT IN AN ORGANIZED HEALTH CARE EDUCATION/TRAINING PROGRAM

## 2023-10-23 PROCEDURE — 88112 CYTOPATH CELL ENHANCE TECH: CPT | Mod: 26,,, | Performed by: PATHOLOGY

## 2023-10-23 PROCEDURE — 63600175 PHARM REV CODE 636 W HCPCS: Performed by: ANESTHESIOLOGY

## 2023-10-23 PROCEDURE — 36000709 HC OR TIME LEV III EA ADD 15 MIN: Performed by: OBSTETRICS & GYNECOLOGY

## 2023-10-23 PROCEDURE — 94799 UNLISTED PULMONARY SVC/PX: CPT

## 2023-10-23 PROCEDURE — 25000003 PHARM REV CODE 250: Performed by: NURSE ANESTHETIST, CERTIFIED REGISTERED

## 2023-10-23 PROCEDURE — 88307 TISSUE EXAM BY PATHOLOGIST: CPT | Performed by: STUDENT IN AN ORGANIZED HEALTH CARE EDUCATION/TRAINING PROGRAM

## 2023-10-23 PROCEDURE — 88307 TISSUE EXAM BY PATHOLOGIST: CPT | Mod: 26,,, | Performed by: STUDENT IN AN ORGANIZED HEALTH CARE EDUCATION/TRAINING PROGRAM

## 2023-10-23 PROCEDURE — 71000033 HC RECOVERY, INTIAL HOUR: Performed by: OBSTETRICS & GYNECOLOGY

## 2023-10-23 PROCEDURE — 58150 TOTAL HYSTERECTOMY: CPT | Mod: ,,, | Performed by: OBSTETRICS & GYNECOLOGY

## 2023-10-23 PROCEDURE — 88331 PR  PATH CONSULT IN SURG,W FRZ SEC: ICD-10-PCS | Mod: 26,,, | Performed by: STUDENT IN AN ORGANIZED HEALTH CARE EDUCATION/TRAINING PROGRAM

## 2023-10-23 PROCEDURE — 11000001 HC ACUTE MED/SURG PRIVATE ROOM

## 2023-10-23 PROCEDURE — 88305 TISSUE EXAM BY PATHOLOGIST: CPT | Performed by: PATHOLOGY

## 2023-10-23 PROCEDURE — 71000039 HC RECOVERY, EACH ADD'L HOUR: Performed by: OBSTETRICS & GYNECOLOGY

## 2023-10-23 PROCEDURE — 88305 TISSUE EXAM BY PATHOLOGIST: ICD-10-PCS | Mod: 26,,, | Performed by: PATHOLOGY

## 2023-10-23 PROCEDURE — D9220A PRA ANESTHESIA: Mod: ANES,,, | Performed by: ANESTHESIOLOGY

## 2023-10-23 PROCEDURE — 25000003 PHARM REV CODE 250: Performed by: STUDENT IN AN ORGANIZED HEALTH CARE EDUCATION/TRAINING PROGRAM

## 2023-10-23 PROCEDURE — 88112 PR  CYTOPATH, CELL ENHANCE TECH: ICD-10-PCS | Mod: 26,,, | Performed by: PATHOLOGY

## 2023-10-23 PROCEDURE — D9220A PRA ANESTHESIA: Mod: CRNA,,, | Performed by: NURSE ANESTHETIST, CERTIFIED REGISTERED

## 2023-10-23 PROCEDURE — 88112 CYTOPATH CELL ENHANCE TECH: CPT | Performed by: PATHOLOGY

## 2023-10-23 PROCEDURE — 36000708 HC OR TIME LEV III 1ST 15 MIN: Performed by: OBSTETRICS & GYNECOLOGY

## 2023-10-23 PROCEDURE — 94761 N-INVAS EAR/PLS OXIMETRY MLT: CPT

## 2023-10-23 PROCEDURE — 88331 PATH CONSLTJ SURG 1 BLK 1SPC: CPT | Performed by: STUDENT IN AN ORGANIZED HEALTH CARE EDUCATION/TRAINING PROGRAM

## 2023-10-23 PROCEDURE — 88331 PATH CONSLTJ SURG 1 BLK 1SPC: CPT | Mod: 26,,, | Performed by: STUDENT IN AN ORGANIZED HEALTH CARE EDUCATION/TRAINING PROGRAM

## 2023-10-23 RX ORDER — HYDRALAZINE HYDROCHLORIDE 20 MG/ML
10 INJECTION INTRAMUSCULAR; INTRAVENOUS EVERY 6 HOURS PRN
Status: DISCONTINUED | OUTPATIENT
Start: 2023-10-23 | End: 2023-10-24 | Stop reason: HOSPADM

## 2023-10-23 RX ORDER — GABAPENTIN 300 MG/1
300 CAPSULE ORAL ONCE
Status: COMPLETED | OUTPATIENT
Start: 2023-10-23 | End: 2023-10-23

## 2023-10-23 RX ORDER — EPHEDRINE SULFATE 50 MG/ML
INJECTION, SOLUTION INTRAVENOUS
Status: DISCONTINUED | OUTPATIENT
Start: 2023-10-23 | End: 2023-10-23

## 2023-10-23 RX ORDER — SODIUM CHLORIDE, SODIUM LACTATE, POTASSIUM CHLORIDE, CALCIUM CHLORIDE 600; 310; 30; 20 MG/100ML; MG/100ML; MG/100ML; MG/100ML
INJECTION, SOLUTION INTRAVENOUS CONTINUOUS
Status: DISCONTINUED | OUTPATIENT
Start: 2023-10-23 | End: 2023-10-24 | Stop reason: HOSPADM

## 2023-10-23 RX ORDER — CALCIUM CARBONATE 200(500)MG
500 TABLET,CHEWABLE ORAL 2 TIMES DAILY PRN
Status: DISCONTINUED | OUTPATIENT
Start: 2023-10-23 | End: 2023-10-24 | Stop reason: HOSPADM

## 2023-10-23 RX ORDER — CELECOXIB 200 MG/1
200 CAPSULE ORAL ONCE
Status: COMPLETED | OUTPATIENT
Start: 2023-10-23 | End: 2023-10-23

## 2023-10-23 RX ORDER — HEPARIN SODIUM 5000 [USP'U]/ML
5000 INJECTION, SOLUTION INTRAVENOUS; SUBCUTANEOUS ONCE
Status: DISCONTINUED | OUTPATIENT
Start: 2023-10-23 | End: 2023-10-23

## 2023-10-23 RX ORDER — SENNOSIDES 8.6 MG/1
8.6 TABLET ORAL 2 TIMES DAILY
Status: DISCONTINUED | OUTPATIENT
Start: 2023-10-23 | End: 2023-10-24 | Stop reason: HOSPADM

## 2023-10-23 RX ORDER — HEPARIN SODIUM 5000 [USP'U]/ML
5000 INJECTION, SOLUTION INTRAVENOUS; SUBCUTANEOUS
Status: DISCONTINUED | OUTPATIENT
Start: 2023-10-23 | End: 2023-10-24 | Stop reason: HOSPADM

## 2023-10-23 RX ORDER — BUPIVACAINE HYDROCHLORIDE 2.5 MG/ML
INJECTION, SOLUTION EPIDURAL; INFILTRATION; INTRACAUDAL
Status: DISCONTINUED | OUTPATIENT
Start: 2023-10-23 | End: 2023-10-23 | Stop reason: HOSPADM

## 2023-10-23 RX ORDER — PROCHLORPERAZINE EDISYLATE 5 MG/ML
5 INJECTION INTRAMUSCULAR; INTRAVENOUS EVERY 30 MIN PRN
Status: DISCONTINUED | OUTPATIENT
Start: 2023-10-23 | End: 2023-10-23 | Stop reason: HOSPADM

## 2023-10-23 RX ORDER — MUPIROCIN 20 MG/G
OINTMENT TOPICAL 2 TIMES DAILY
Status: DISCONTINUED | OUTPATIENT
Start: 2023-10-23 | End: 2023-10-24 | Stop reason: HOSPADM

## 2023-10-23 RX ORDER — MEPERIDINE HYDROCHLORIDE 25 MG/ML
12.5 INJECTION INTRAMUSCULAR; INTRAVENOUS; SUBCUTANEOUS ONCE AS NEEDED
Status: DISCONTINUED | OUTPATIENT
Start: 2023-10-23 | End: 2023-10-23 | Stop reason: HOSPADM

## 2023-10-23 RX ORDER — DEXAMETHASONE SODIUM PHOSPHATE 4 MG/ML
INJECTION, SOLUTION INTRA-ARTICULAR; INTRALESIONAL; INTRAMUSCULAR; INTRAVENOUS; SOFT TISSUE
Status: DISCONTINUED | OUTPATIENT
Start: 2023-10-23 | End: 2023-10-23

## 2023-10-23 RX ORDER — HEPARIN SODIUM 5000 [USP'U]/ML
5000 INJECTION, SOLUTION INTRAVENOUS; SUBCUTANEOUS
Status: COMPLETED | OUTPATIENT
Start: 2023-10-23 | End: 2023-10-23

## 2023-10-23 RX ORDER — ONDANSETRON 2 MG/ML
INJECTION INTRAMUSCULAR; INTRAVENOUS
Status: DISCONTINUED | OUTPATIENT
Start: 2023-10-23 | End: 2023-10-23

## 2023-10-23 RX ORDER — OXYCODONE HYDROCHLORIDE 10 MG/1
10 TABLET ORAL EVERY 4 HOURS PRN
Status: DISCONTINUED | OUTPATIENT
Start: 2023-10-23 | End: 2023-10-24 | Stop reason: HOSPADM

## 2023-10-23 RX ORDER — METOPROLOL SUCCINATE 25 MG/1
25 TABLET, EXTENDED RELEASE ORAL DAILY
Status: DISCONTINUED | OUTPATIENT
Start: 2023-10-23 | End: 2023-10-24 | Stop reason: HOSPADM

## 2023-10-23 RX ORDER — NALOXONE HCL 0.4 MG/ML
0.02 VIAL (ML) INJECTION
Status: DISCONTINUED | OUTPATIENT
Start: 2023-10-23 | End: 2023-10-24 | Stop reason: HOSPADM

## 2023-10-23 RX ORDER — OXYCODONE HYDROCHLORIDE 5 MG/1
5 TABLET ORAL EVERY 4 HOURS PRN
Status: DISCONTINUED | OUTPATIENT
Start: 2023-10-23 | End: 2023-10-24 | Stop reason: HOSPADM

## 2023-10-23 RX ORDER — IBUPROFEN 200 MG
200 TABLET ORAL EVERY 6 HOURS
Status: DISCONTINUED | OUTPATIENT
Start: 2023-10-24 | End: 2023-10-24

## 2023-10-23 RX ORDER — LEVOTHYROXINE SODIUM 50 UG/1
50 TABLET ORAL DAILY
Status: DISCONTINUED | OUTPATIENT
Start: 2023-10-23 | End: 2023-10-24 | Stop reason: HOSPADM

## 2023-10-23 RX ORDER — MIDAZOLAM HYDROCHLORIDE 1 MG/ML
INJECTION INTRAMUSCULAR; INTRAVENOUS
Status: DISCONTINUED | OUTPATIENT
Start: 2023-10-23 | End: 2023-10-23

## 2023-10-23 RX ORDER — LIDOCAINE HYDROCHLORIDE 10 MG/ML
1 INJECTION, SOLUTION EPIDURAL; INFILTRATION; INTRACAUDAL; PERINEURAL ONCE
Status: DISCONTINUED | OUTPATIENT
Start: 2023-10-23 | End: 2023-10-23 | Stop reason: HOSPADM

## 2023-10-23 RX ORDER — SODIUM CHLORIDE 0.9 % (FLUSH) 0.9 %
3 SYRINGE (ML) INJECTION
Status: DISCONTINUED | OUTPATIENT
Start: 2023-10-23 | End: 2023-10-23 | Stop reason: HOSPADM

## 2023-10-23 RX ORDER — PROCHLORPERAZINE EDISYLATE 5 MG/ML
5 INJECTION INTRAMUSCULAR; INTRAVENOUS EVERY 6 HOURS PRN
Status: DISCONTINUED | OUTPATIENT
Start: 2023-10-23 | End: 2023-10-24 | Stop reason: HOSPADM

## 2023-10-23 RX ORDER — ONDANSETRON 2 MG/ML
4 INJECTION INTRAMUSCULAR; INTRAVENOUS EVERY 6 HOURS PRN
Status: DISCONTINUED | OUTPATIENT
Start: 2023-10-23 | End: 2023-10-24 | Stop reason: HOSPADM

## 2023-10-23 RX ORDER — CEFAZOLIN SODIUM 2 G/50ML
2 SOLUTION INTRAVENOUS
Status: COMPLETED | OUTPATIENT
Start: 2023-10-23 | End: 2023-10-23

## 2023-10-23 RX ORDER — ADHESIVE BANDAGE
30 BANDAGE TOPICAL 2 TIMES DAILY
Status: DISCONTINUED | OUTPATIENT
Start: 2023-10-23 | End: 2023-10-24 | Stop reason: HOSPADM

## 2023-10-23 RX ORDER — FENTANYL CITRATE 50 UG/ML
INJECTION, SOLUTION INTRAMUSCULAR; INTRAVENOUS
Status: DISCONTINUED | OUTPATIENT
Start: 2023-10-23 | End: 2023-10-23

## 2023-10-23 RX ORDER — ACETAMINOPHEN 500 MG
1000 TABLET ORAL
Status: COMPLETED | OUTPATIENT
Start: 2023-10-23 | End: 2023-10-23

## 2023-10-23 RX ORDER — LIDOCAINE HYDROCHLORIDE 20 MG/ML
INJECTION INTRAVENOUS
Status: DISCONTINUED | OUTPATIENT
Start: 2023-10-23 | End: 2023-10-23

## 2023-10-23 RX ORDER — LIDOCAINE HYDROCHLORIDE 10 MG/ML
0.5 INJECTION, SOLUTION EPIDURAL; INFILTRATION; INTRACAUDAL; PERINEURAL ONCE
Status: DISCONTINUED | OUTPATIENT
Start: 2023-10-23 | End: 2023-10-23 | Stop reason: HOSPADM

## 2023-10-23 RX ORDER — HYDROMORPHONE HYDROCHLORIDE 2 MG/ML
0.4 INJECTION, SOLUTION INTRAMUSCULAR; INTRAVENOUS; SUBCUTANEOUS EVERY 5 MIN PRN
Status: DISCONTINUED | OUTPATIENT
Start: 2023-10-23 | End: 2023-10-23 | Stop reason: HOSPADM

## 2023-10-23 RX ORDER — PROPOFOL 10 MG/ML
VIAL (ML) INTRAVENOUS
Status: DISCONTINUED | OUTPATIENT
Start: 2023-10-23 | End: 2023-10-23

## 2023-10-23 RX ORDER — ROCURONIUM BROMIDE 10 MG/ML
INJECTION, SOLUTION INTRAVENOUS
Status: DISCONTINUED | OUTPATIENT
Start: 2023-10-23 | End: 2023-10-23

## 2023-10-23 RX ORDER — OXYCODONE HYDROCHLORIDE 5 MG/1
5 TABLET ORAL
Status: DISCONTINUED | OUTPATIENT
Start: 2023-10-23 | End: 2023-10-23 | Stop reason: HOSPADM

## 2023-10-23 RX ORDER — ALPRAZOLAM 0.25 MG/1
0.5 TABLET ORAL DAILY PRN
Status: DISCONTINUED | OUTPATIENT
Start: 2023-10-23 | End: 2023-10-24 | Stop reason: HOSPADM

## 2023-10-23 RX ORDER — ACETAMINOPHEN 500 MG
1000 TABLET ORAL EVERY 6 HOURS
Status: DISCONTINUED | OUTPATIENT
Start: 2023-10-23 | End: 2023-10-24 | Stop reason: HOSPADM

## 2023-10-23 RX ORDER — SIMETHICONE 80 MG
1 TABLET,CHEWABLE ORAL 3 TIMES DAILY PRN
Status: DISCONTINUED | OUTPATIENT
Start: 2023-10-23 | End: 2023-10-24 | Stop reason: HOSPADM

## 2023-10-23 RX ORDER — IRON POLYSACCHARIDE COMPLEX 150 MG
150 CAPSULE ORAL DAILY
Status: DISCONTINUED | OUTPATIENT
Start: 2023-10-23 | End: 2023-10-24 | Stop reason: HOSPADM

## 2023-10-23 RX ORDER — SODIUM CHLORIDE, SODIUM LACTATE, POTASSIUM CHLORIDE, CALCIUM CHLORIDE 600; 310; 30; 20 MG/100ML; MG/100ML; MG/100ML; MG/100ML
INJECTION, SOLUTION INTRAVENOUS CONTINUOUS
Status: DISCONTINUED | OUTPATIENT
Start: 2023-10-23 | End: 2023-10-23

## 2023-10-23 RX ORDER — ACETAMINOPHEN 500 MG
1000 TABLET ORAL ONCE
Status: DISCONTINUED | OUTPATIENT
Start: 2023-10-23 | End: 2023-10-23

## 2023-10-23 RX ORDER — HYDROMORPHONE HYDROCHLORIDE 1 MG/ML
0.4 INJECTION, SOLUTION INTRAMUSCULAR; INTRAVENOUS; SUBCUTANEOUS
Status: DISCONTINUED | OUTPATIENT
Start: 2023-10-23 | End: 2023-10-24

## 2023-10-23 RX ORDER — DIPHENHYDRAMINE HYDROCHLORIDE 50 MG/ML
25 INJECTION INTRAMUSCULAR; INTRAVENOUS EVERY 6 HOURS PRN
Status: DISCONTINUED | OUTPATIENT
Start: 2023-10-23 | End: 2023-10-24 | Stop reason: HOSPADM

## 2023-10-23 RX ORDER — KETOROLAC TROMETHAMINE 30 MG/ML
15 INJECTION, SOLUTION INTRAMUSCULAR; INTRAVENOUS EVERY 6 HOURS
Status: DISCONTINUED | OUTPATIENT
Start: 2023-10-23 | End: 2023-10-24

## 2023-10-23 RX ORDER — TALC
6 POWDER (GRAM) TOPICAL NIGHTLY PRN
Status: DISCONTINUED | OUTPATIENT
Start: 2023-10-23 | End: 2023-10-24 | Stop reason: HOSPADM

## 2023-10-23 RX ADMIN — SODIUM CHLORIDE, SODIUM LACTATE, POTASSIUM CHLORIDE, AND CALCIUM CHLORIDE: 600; 310; 30; 20 INJECTION, SOLUTION INTRAVENOUS at 09:10

## 2023-10-23 RX ADMIN — MUPIROCIN: 20 OINTMENT TOPICAL at 09:10

## 2023-10-23 RX ADMIN — EPHEDRINE SULFATE 5 MG: 50 INJECTION INTRAVENOUS at 10:10

## 2023-10-23 RX ADMIN — ACETAMINOPHEN 1000 MG: 500 TABLET ORAL at 11:10

## 2023-10-23 RX ADMIN — GLYCOPYRROLATE 0.2 MG: 0.2 INJECTION, SOLUTION INTRAMUSCULAR; INTRAVITREAL at 09:10

## 2023-10-23 RX ADMIN — SODIUM CHLORIDE, SODIUM LACTATE, POTASSIUM CHLORIDE, AND CALCIUM CHLORIDE: 600; 310; 30; 20 INJECTION, SOLUTION INTRAVENOUS at 10:10

## 2023-10-23 RX ADMIN — LIDOCAINE HYDROCHLORIDE 80 MG: 20 INJECTION, SOLUTION INTRAVENOUS at 09:10

## 2023-10-23 RX ADMIN — GABAPENTIN 300 MG: 300 CAPSULE ORAL at 07:10

## 2023-10-23 RX ADMIN — HEPARIN SODIUM 5000 UNITS: 5000 INJECTION INTRAVENOUS; SUBCUTANEOUS at 08:10

## 2023-10-23 RX ADMIN — LEVOTHYROXINE SODIUM 50 MCG: 50 TABLET ORAL at 03:10

## 2023-10-23 RX ADMIN — CEFAZOLIN SODIUM 2 G: 2 SOLUTION INTRAVENOUS at 09:10

## 2023-10-23 RX ADMIN — FENTANYL CITRATE 50 MCG: 50 INJECTION, SOLUTION INTRAMUSCULAR; INTRAVENOUS at 10:10

## 2023-10-23 RX ADMIN — ACETAMINOPHEN 1000 MG: 500 TABLET ORAL at 06:10

## 2023-10-23 RX ADMIN — SODIUM CHLORIDE, POTASSIUM CHLORIDE, SODIUM LACTATE AND CALCIUM CHLORIDE: 600; 310; 30; 20 INJECTION, SOLUTION INTRAVENOUS at 03:10

## 2023-10-23 RX ADMIN — FENTANYL CITRATE 100 MCG: 50 INJECTION, SOLUTION INTRAMUSCULAR; INTRAVENOUS at 09:10

## 2023-10-23 RX ADMIN — KETOROLAC TROMETHAMINE 15 MG: 30 INJECTION, SOLUTION INTRAMUSCULAR; INTRAVENOUS at 06:10

## 2023-10-23 RX ADMIN — KETOROLAC TROMETHAMINE 15 MG: 30 INJECTION, SOLUTION INTRAMUSCULAR; INTRAVENOUS at 11:10

## 2023-10-23 RX ADMIN — OXYCODONE HYDROCHLORIDE 5 MG: 5 TABLET ORAL at 04:10

## 2023-10-23 RX ADMIN — ONDANSETRON HYDROCHLORIDE 4 MG: 2 INJECTION INTRAMUSCULAR; INTRAVENOUS at 11:10

## 2023-10-23 RX ADMIN — ACETAMINOPHEN 1000 MG: 500 TABLET ORAL at 07:10

## 2023-10-23 RX ADMIN — HEPARIN SODIUM 5000 UNITS: 5000 INJECTION INTRAVENOUS; SUBCUTANEOUS at 09:10

## 2023-10-23 RX ADMIN — CARBOXYMETHYLCELLULOSE SODIUM 1 DROP: 2.5 SOLUTION/ DROPS OPHTHALMIC at 09:10

## 2023-10-23 RX ADMIN — HYDROMORPHONE HYDROCHLORIDE 0.4 MG: 2 INJECTION INTRAMUSCULAR; INTRAVENOUS; SUBCUTANEOUS at 01:10

## 2023-10-23 RX ADMIN — METOPROLOL SUCCINATE 25 MG: 25 TABLET, EXTENDED RELEASE ORAL at 03:10

## 2023-10-23 RX ADMIN — MAGNESIUM HYDROXIDE 2400 MG: 400 SUSPENSION ORAL at 03:10

## 2023-10-23 RX ADMIN — MIDAZOLAM HYDROCHLORIDE 2 MG: 1 INJECTION, SOLUTION INTRAMUSCULAR; INTRAVENOUS at 09:10

## 2023-10-23 RX ADMIN — SENNOSIDES 8.6 MG: 8.6 TABLET, FILM COATED ORAL at 03:10

## 2023-10-23 RX ADMIN — DEXAMETHASONE SODIUM PHOSPHATE 8 MG: 4 INJECTION, SOLUTION INTRAMUSCULAR; INTRAVENOUS at 09:10

## 2023-10-23 RX ADMIN — OXYCODONE HYDROCHLORIDE 5 MG: 5 TABLET ORAL at 12:10

## 2023-10-23 RX ADMIN — ROCURONIUM BROMIDE 50 MG: 10 SOLUTION INTRAVENOUS at 09:10

## 2023-10-23 RX ADMIN — Medication 30 MG: at 09:10

## 2023-10-23 RX ADMIN — Medication 170 MG: at 09:10

## 2023-10-23 RX ADMIN — CELECOXIB 200 MG: 200 CAPSULE ORAL at 07:10

## 2023-10-23 NOTE — PLAN OF CARE
Sw spoke with patient and her . Patient's PCP is correct and preferred pharmacy is bedside. Patient denied any DME or HH. Patient is independent in her ADLs.Patient's family will provide transportation home upon discharge.     [facility] ([address])  Initial Discharge Assessment       Primary Care Provider: Lisandro Cox MD    Admission Diagnosis: Adnexal mass [N94.89]    Admission Date: 10/23/2023  Expected Discharge Date:     Transition of Care Barriers: (P) None    Payor: [employer] MEDICARE ADVANTAGE / Plan: [employer] MCARE ADV / Product Type: Medicare Advantage /     Extended Emergency Contact Information  Primary Emergency Contact: Eliel Hill  Address: [address] [place] [address] [facility]  Home Phone: [phone]  Mobile Phone: [phone]  Relation: Spouse    Discharge Plan A: (P) Home with family         [facility] [address] [place] - [address] [address]  [address] [facility] [address]  Phone: [phone] Fax: [phone]    [id number] [place] - [address] [facility] [address]  [address]  [facility] [address]  Phone: [phone] Fax: [phone]      Initial Assessment (most recent)       Adult Discharge Assessment - 10/23/23 1435          Discharge Assessment    Assessment Type Discharge Planning Assessment (P)      Confirmed/corrected address, phone number and insurance Yes (P)      Confirmed Demographics Correct on Facesheet (P)      Source of Information family (P)      People in Home spouse (P)      Do you expect to return to your current living situation? Yes (P)      Do you have help at home or someone to help you manage your care at home? Yes (P)      Who are your caregiver(s) and their phone number(s)? Eliel Hill (Spouse)   [phone] (P)      Prior to hospitilization cognitive status: Unable to Assess (P)      Current cognitive status: Unable to Assess  (P)      Equipment Currently Used at Home none (P)      Readmission within 30 days? No (P)      Patient currently being followed by outpatient case management? No (P)      Do you currently have service(s) that help you manage your care at home? No (P)      Do you take prescription medications? Yes (P)      Do you have prescription coverage? Yes (P)      Do you have any problems affording any of your prescribed medications? No (P)      Is the patient taking medications as prescribed? yes (P)      How do you get to doctors appointments? car, drives self;family or friend will provide (P)      Are you on dialysis? No (P)      Do you take coumadin? No (P)      DME Needed Upon Discharge  none (P)      Discharge Plan discussed with: Patient;Spouse/sig other (P)      Transition of Care Barriers None (P)      Discharge Plan A Home with family (P)

## 2023-10-23 NOTE — ANESTHESIA POSTPROCEDURE EVALUATION
Anesthesia Post Evaluation    Patient: Bhavani Hill    Procedure(s) Performed: Procedure(s) (LRB):  LAPAROTOMY, EXPLORATORY (N/A)  HYSTERECTOMY, TOTAL, ABDOMINAL (N/A)  SALPINGO-OOPHORECTOMY (Bilateral)    Final Anesthesia Type: general      Patient location during evaluation: PACU  Patient participation: Yes- Able to Participate  Level of consciousness: awake and alert  Post-procedure vital signs: reviewed and stable  Pain management: adequate  Airway patency: patent  ADRIANA mitigation strategies: Extubation while patient is awake  PONV status at discharge: No PONV  Anesthetic complications: no      Cardiovascular status: hemodynamically stable  Respiratory status: unassisted  Hydration status: euvolemic  Follow-up not needed.          Vitals Value Taken Time   /74 10/23/23 1415   Temp 35.7 °C (96.2 °F) 10/23/23 1415   Pulse 69 10/23/23 1415   Resp 18 10/23/23 1415   SpO2 98 % 10/23/23 1415         Event Time   Out of Recovery 13:51:48         Pain/Joyce Score: Pain Rating Prior to Med Admin: 7 (10/23/2023  1:31 PM)  Pain Rating Post Med Admin: 5 (10/23/2023  1:45 PM)  Joyce Score: 10 (10/23/2023  1:06 PM)

## 2023-10-23 NOTE — CARE UPDATE
"Afternoon Assessment:    Patient resting comfortably in bed. She is tolerating liquids without N/V. Mccann in place draining clear yellow urine. -bowel, -ambulation.     Temp:  [96.2 °F (35.7 °C)-98.1 °F (36.7 °C)] 96.2 °F (35.7 °C)  Pulse:  [62-69] 69  Resp:  [18] 18  SpO2:  [98 %-99 %] 98 %  BP: (133-183)/(70-89) 154/74    PE:  Abdomen: Soft, nondistended. Island dressing with no shadowing    Labs:  No results for input(s): "WBC", "RBC", "HGB", "HCT", "PLT", "MCV", "MCH", "MCHC" in the last 24 hours.    A/P:  - Continue routine post op advances  - Continue Toradol, Tylenol, Oxy IR, Dilaudid BTP  - D/c mccann tomorrow AM  - DVT ppx: Heparin > Lovenox      Katherine Boecking MD   Ob/Gyn PGY-4      "

## 2023-10-23 NOTE — H&P
The patient has been examined and the H&P has been reviewed:  I concur with the findings and no changes have occurred since H&P was written.    Patient cleared for Anesthesia: General    Anesthesia/Surgery risks, benefits and alternative options discussed and understood by patient/family.  We will proceed with an open RAMIRO/BSO and proceed as indicated.      ---------------------------------------------------------------------    REFERRING PROVIDER  Gilberto Whiteside MD     HISTORY OF PRESENT CONDITION  Chief complaint: right ovarian mass  Bhavani Hill is a 68 y.o. who presents in consultation for an opinion regarding a right ovarian mass with a CA-125 of 19.     +PO. -N/V. +Flatus. +BM. -VB, VD, changes in stool or urine. -Abdominal or pelvic pain. -Unintentional weight loss.      REVIEW OF SYSTEMS  All systems reviewed and negative except as noted in HPI.    OBJECTIVE   Vitals:    10/23/23 0714   BP: (!) 183/89   Pulse: 67   Resp: 18   Temp: 98 °F (36.7 °C)      There is no height or weight on file to calculate BMI.      1. General: Well appearing, no apparent distress, alert and oriented.  2. Lymph: Neck symmetric without cervical or supraclavicular adenopathy or mass.  3. Lungs: Normal respiratory rate, no accessory muscle use.  4. Cardiac: Normal rate  5. Psych: Normal affect.  6. Abdomen:  non-distended, soft, non-tender, is a mass that is at the level of the umbilicus with full mobility, no ascites, no hepatosplenomegaly.  7. Skin: Warm, dry, no rashes or lesions.   8. Extremities: Bilateral lower extremities without edema or tenderness.  9. Genitourinary: Deferred      ECOG status: 0    LABORATORY DATA  Lab data reviewed.    RADIOLOGICAL DATA  Radiology data reviewed.    PATHOLOGY DATA  Pathology data reviewed.    ASSESSMENT / PLAN     1. Adnexal mass         F/U [facility],     9/11/23: CT A/P w/: - Omental caking, lymphadenopathy, or ascites. 16 cm complex adnexal mass  9/12/23: Pelvic US:  9/13/23: CA-125 =  19  9/18/23: Hb 13.1, Plt 199, Cr 0.8    PSH:   LTCS x2  Laparscopic cholecystectomy      I discussed with the patient that the primary treatment for an adnexal mass is observation versus surgical management.  Surgical management will depend on the use of frozen pathology.  Differential diagnoses includes a benign, borderline, or malignant mass.  I will plan to perform this in a open fashion.  I also discussed with the patient the role of staging for borderline frozen pathology and debulking for ovarian cancer.  This may include a total abdominal hysterectomy, bilateral salpingo-oophorectomy, omentectomy, diaphragm stripping, bowel resection, splenectomy, and any other necessary procedures to remove all visible disease.  The procedure and its risks and benefits were discussed in detail.      PATIENT EDUCATION  Ready to learn, no apparent learning barriers were identified; learning preferences include listening.  Explained diagnosis and treatment plan; patient expressed understanding of the content.    INFORMED CONSENT  Discussed the risks, benefits, and alternatives of the procedure and of possible blood transfusion.  Discussed the necessity of other members of the healthcare team participating in the procedure.  All questions answered and consent given.      Gilberto Whiteside

## 2023-10-23 NOTE — PLAN OF CARE
Patient prefers to have Ray present for discharge teaching.     If Ray is unavailable, you can speak to sister Toya.

## 2023-10-23 NOTE — OP NOTE
[facility] - Surgery ([place])    Procedure(s) (LRB):  LAPAROTOMY, EXPLORATORY (N/A)  HYSTERECTOMY, TOTAL, ABDOMINAL (N/A)  SALPINGO-OOPHORECTOMY (Bilateral)    DATE OF SURGERY  10/23/2023     Surgeon(s) and Role  Primary: Gilberto Whiteside MD     ANESTHESIA TYPE  General     PRE-OPERATIVE DIAGNOSIS  Adnexal mass [N94.89]    POST-OPERATIVE DIAGNOSIS  Post-Op Diagnosis Codes:     * Adnexal mass [N94.89]    FINDINGS:  Normal diaphragms.  Rough liver likely secondary to hemangiomas.  Golf sized mass at the splenic hilum.  Normal omentum.  Normal para-aortic lymph nodes.  Diffuse diverticulosis throughout the colon.  Normal small bowel mesentery and serosa.  Normal uterus and right adnexa.  Melon sized mass in the left ovary with a smooth capsule.  It was removed intact without rupture.  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      Procedure in Detail: The patient was prepped and draped in the supine position. A low midline incision was made and carried down to the peritoneum. The abdomen was entered. The abdomen and pelvis were explored and revealed the operative findings above. Intraperitoneal anatomy was normal and we proceeded with the assigned procedure..  The cecum was identified and examined to the ligament of Treitz without abnormalities. The patient was placed in steep Trendelenburg, a Bookwalter retractor was inserted, and the small bowel was packed into the upper abdomen.  The round ligaments were divided, broad ligaments incised, and ureters identified. The infundibulopelvic ligaments were isolated, clamped, cut, and ligated. The bladder was dissected off the lower uterine segment, cervix, and upper 2 cm of the vagina. The cardinal ligaments were serially clamped, cut, and tied, being palpably free of the ureters, and a circumferential incision was made around the vagina. The uterus, cervix, bilateral tubes and ovaries were removed via the laparotomy and sent to Pathology. The vagina was  closed in the usual two-layer fashion, securing the uterosacral ligaments into the cuff closure.  Frozen pathology was consistent with a benign tumor.  Hemostasis was secured.  The abdomen and pelvis were then irrigated with warm normal saline, and hemostasis was assured. The fascia was closed with looped 0 PDS in a running fashion, the subcutaneous tissue was re-approximated using an absorbable suture, and the skin was closed with 4-0 Monocryl in a subcuticular fashion.  The patient was transferred to recovery in stable condition.  Sponge, salt, needle, and instrument count was correct.  I was present and scrubbed for the entirety of the case.     ASSISTANT SURGEONS  Residents    UNUSUAL CIRCUMSTANCES  No    CONDITION  chronic    POST-OP COMPLICATION  No    DIABETIC  No    SPECIMENS  Specimens (From admission, onward)       Start     Ordered    10/23/23 1110  Specimen to Pathology, Surgery Gynecology and Obstetrics  Once        Comments: Pre-op Diagnosis: Adnexal mass [N94.89]Procedure(s):LAPAROTOMY, EXPLORATORYHYSTERECTOMY, TOTAL, ABDOMINALSALPINGO-OOPHORECTOMY Number of specimens: 2Name of specimens: 1. Left Fallopian Tube and Ovary - FROZEN2. Cervix, Uterus, Right Tube and Ovary - PERM     References:    Click here for ordering Quick Tip   Question Answer Comment   Procedure Type: Gynecology and Obstetrics    Specimen Class: Known or suspected malignancy    Which provider would you like to cc? TOMEKA DOMINGUEZ    Release to patient Immediate        10/23/23 1202    10/23/23 1012  Cytology, Fluid/Wash/Brush  Once        Question Answer Comment   Source: Bladder wash    Clinical Information: Adnexal Mass    Specific Site: Pelvic washings    Other Requests: na    Release to patient Immediate        10/23/23 1016                     DRAINS       Urethral Catheter 10/23/23 1000 Silicone;Straight-tip 16 Fr. (Active)        ESTIMATED BLOOD LOSS  150 mL

## 2023-10-23 NOTE — ANESTHESIA PROCEDURE NOTES
Intubation    Date/Time: 10/23/2023 9:47 AM    Performed by: aFbi Ward CRNA  Authorized by: Mariano Leon MD    Intubation:     Induction:  Intravenous    Intubated:  Postinduction    Mask Ventilation:  Easy mask    Attempts:  1    Attempted By:  CRNA    Method of Intubation:  Video laryngoscopy    Blade:  Villalba 3    Laryngeal View Grade: Grade I - full view of cords      Difficult Airway Encountered?: No      Complications:  None    Airway Device:  Oral endotracheal tube    Airway Device Size:  7.5    Style/Cuff Inflation:  Cuffed (inflated to minimal occlusive pressure)    Tube secured:  22    Secured at:  The lips    Placement Verified By:  Capnometry    Complicating Factors:  None    Findings Post-Intubation:  BS equal bilateral and atraumatic/condition of teeth unchanged

## 2023-10-24 VITALS
DIASTOLIC BLOOD PRESSURE: 64 MMHG | OXYGEN SATURATION: 95 % | BODY MASS INDEX: 27.31 KG/M2 | HEIGHT: 64 IN | HEART RATE: 61 BPM | SYSTOLIC BLOOD PRESSURE: 114 MMHG | WEIGHT: 160 LBS | RESPIRATION RATE: 20 BRPM | TEMPERATURE: 98 F

## 2023-10-24 LAB
ANION GAP SERPL CALC-SCNC: 8 MMOL/L (ref 8–16)
BASOPHILS # BLD AUTO: 0.02 K/UL (ref 0–0.2)
BASOPHILS NFR BLD: 0.3 % (ref 0–1.9)
BUN SERPL-MCNC: 10 MG/DL (ref 8–23)
CALCIUM SERPL-MCNC: 7.8 MG/DL (ref 8.7–10.5)
CHLORIDE SERPL-SCNC: 95 MMOL/L (ref 95–110)
CO2 SERPL-SCNC: 26 MMOL/L (ref 23–29)
CREAT SERPL-MCNC: 0.7 MG/DL (ref 0.5–1.4)
DIFFERENTIAL METHOD: ABNORMAL
EOSINOPHIL # BLD AUTO: 0 K/UL (ref 0–0.5)
EOSINOPHIL NFR BLD: 0.1 % (ref 0–8)
ERYTHROCYTE [DISTWIDTH] IN BLOOD BY AUTOMATED COUNT: 14 % (ref 11.5–14.5)
EST. GFR  (NO RACE VARIABLE): >60 ML/MIN/1.73 M^2
GLUCOSE SERPL-MCNC: 111 MG/DL (ref 70–110)
HCT VFR BLD AUTO: 31.1 % (ref 37–48.5)
HGB BLD-MCNC: 10.5 G/DL (ref 12–16)
IMM GRANULOCYTES # BLD AUTO: 0.03 K/UL (ref 0–0.04)
IMM GRANULOCYTES NFR BLD AUTO: 0.4 % (ref 0–0.5)
LYMPHOCYTES # BLD AUTO: 1.6 K/UL (ref 1–4.8)
LYMPHOCYTES NFR BLD: 21.5 % (ref 18–48)
MCH RBC QN AUTO: 28.1 PG (ref 27–31)
MCHC RBC AUTO-ENTMCNC: 33.8 G/DL (ref 32–36)
MCV RBC AUTO: 83 FL (ref 82–98)
MONOCYTES # BLD AUTO: 0.8 K/UL (ref 0.3–1)
MONOCYTES NFR BLD: 10.4 % (ref 4–15)
NEUTROPHILS # BLD AUTO: 5 K/UL (ref 1.8–7.7)
NEUTROPHILS NFR BLD: 67.3 % (ref 38–73)
NRBC BLD-RTO: 0 /100 WBC
PLATELET # BLD AUTO: 167 K/UL (ref 150–450)
PMV BLD AUTO: 11.3 FL (ref 9.2–12.9)
POTASSIUM SERPL-SCNC: 2.9 MMOL/L (ref 3.5–5.1)
RBC # BLD AUTO: 3.74 M/UL (ref 4–5.4)
SODIUM SERPL-SCNC: 129 MMOL/L (ref 136–145)
WBC # BLD AUTO: 7.43 K/UL (ref 3.9–12.7)

## 2023-10-24 PROCEDURE — 25000003 PHARM REV CODE 250: Performed by: STUDENT IN AN ORGANIZED HEALTH CARE EDUCATION/TRAINING PROGRAM

## 2023-10-24 PROCEDURE — 36415 COLL VENOUS BLD VENIPUNCTURE: CPT | Performed by: STUDENT IN AN ORGANIZED HEALTH CARE EDUCATION/TRAINING PROGRAM

## 2023-10-24 PROCEDURE — 94761 N-INVAS EAR/PLS OXIMETRY MLT: CPT

## 2023-10-24 PROCEDURE — 80048 BASIC METABOLIC PNL TOTAL CA: CPT | Performed by: STUDENT IN AN ORGANIZED HEALTH CARE EDUCATION/TRAINING PROGRAM

## 2023-10-24 PROCEDURE — 94799 UNLISTED PULMONARY SVC/PX: CPT

## 2023-10-24 PROCEDURE — 25000003 PHARM REV CODE 250

## 2023-10-24 PROCEDURE — 63600175 PHARM REV CODE 636 W HCPCS

## 2023-10-24 PROCEDURE — 63600175 PHARM REV CODE 636 W HCPCS: Performed by: STUDENT IN AN ORGANIZED HEALTH CARE EDUCATION/TRAINING PROGRAM

## 2023-10-24 PROCEDURE — 85025 COMPLETE CBC W/AUTO DIFF WBC: CPT | Performed by: STUDENT IN AN ORGANIZED HEALTH CARE EDUCATION/TRAINING PROGRAM

## 2023-10-24 RX ORDER — ACETAMINOPHEN 500 MG
1000 TABLET ORAL EVERY 6 HOURS
Qty: 60 TABLET | Refills: 2 | Status: SHIPPED | OUTPATIENT
Start: 2023-10-24 | End: 2024-03-06

## 2023-10-24 RX ORDER — IBUPROFEN 600 MG/1
600 TABLET ORAL EVERY 6 HOURS
Status: DISCONTINUED | OUTPATIENT
Start: 2023-10-24 | End: 2023-10-24 | Stop reason: HOSPADM

## 2023-10-24 RX ORDER — KETOROLAC TROMETHAMINE 30 MG/ML
15 INJECTION, SOLUTION INTRAMUSCULAR; INTRAVENOUS EVERY 6 HOURS
Status: COMPLETED | OUTPATIENT
Start: 2023-10-24 | End: 2023-10-24

## 2023-10-24 RX ORDER — POTASSIUM CHLORIDE 20 MEQ/1
40 TABLET, EXTENDED RELEASE ORAL ONCE
Status: COMPLETED | OUTPATIENT
Start: 2023-10-24 | End: 2023-10-24

## 2023-10-24 RX ORDER — OXYCODONE HYDROCHLORIDE 5 MG/1
5 TABLET ORAL EVERY 4 HOURS PRN
Qty: 20 TABLET | Refills: 0 | Status: SHIPPED | OUTPATIENT
Start: 2023-10-24 | End: 2023-10-31 | Stop reason: SDUPTHER

## 2023-10-24 RX ORDER — IBUPROFEN 600 MG/1
600 TABLET ORAL EVERY 6 HOURS
Qty: 60 TABLET | Refills: 2 | Status: SHIPPED | OUTPATIENT
Start: 2023-10-24 | End: 2024-03-06

## 2023-10-24 RX ORDER — SENNOSIDES 8.6 MG/1
1 TABLET ORAL 2 TIMES DAILY
Qty: 60 TABLET | Refills: 2 | Status: SHIPPED | OUTPATIENT
Start: 2023-10-24

## 2023-10-24 RX ADMIN — HEPARIN SODIUM 5000 UNITS: 5000 INJECTION INTRAVENOUS; SUBCUTANEOUS at 03:10

## 2023-10-24 RX ADMIN — KETOROLAC TROMETHAMINE 15 MG: 30 INJECTION, SOLUTION INTRAMUSCULAR at 11:10

## 2023-10-24 RX ADMIN — OXYCODONE HYDROCHLORIDE 5 MG: 5 TABLET ORAL at 11:10

## 2023-10-24 RX ADMIN — METOPROLOL SUCCINATE 25 MG: 25 TABLET, EXTENDED RELEASE ORAL at 09:10

## 2023-10-24 RX ADMIN — SODIUM CHLORIDE 500 ML: 9 INJECTION, SOLUTION INTRAVENOUS at 06:10

## 2023-10-24 RX ADMIN — ACETAMINOPHEN 1000 MG: 500 TABLET ORAL at 11:10

## 2023-10-24 RX ADMIN — OXYCODONE HYDROCHLORIDE 5 MG: 5 TABLET ORAL at 05:10

## 2023-10-24 RX ADMIN — MAGNESIUM HYDROXIDE 2400 MG: 400 SUSPENSION ORAL at 09:10

## 2023-10-24 RX ADMIN — KETOROLAC TROMETHAMINE 15 MG: 30 INJECTION, SOLUTION INTRAMUSCULAR; INTRAVENOUS at 06:10

## 2023-10-24 RX ADMIN — POTASSIUM CHLORIDE 40 MEQ: 1500 TABLET, EXTENDED RELEASE ORAL at 06:10

## 2023-10-24 RX ADMIN — ALPRAZOLAM 0.5 MG: 0.25 TABLET ORAL at 05:10

## 2023-10-24 RX ADMIN — MUPIROCIN: 20 OINTMENT TOPICAL at 09:10

## 2023-10-24 RX ADMIN — SENNOSIDES 8.6 MG: 8.6 TABLET, FILM COATED ORAL at 09:10

## 2023-10-24 RX ADMIN — HEPARIN SODIUM 5000 UNITS: 5000 INJECTION INTRAVENOUS; SUBCUTANEOUS at 06:10

## 2023-10-24 RX ADMIN — LEVOTHYROXINE SODIUM 50 MCG: 50 TABLET ORAL at 09:10

## 2023-10-24 NOTE — DISCHARGE SUMMARY
Discharge Summary  Gynecology Oncology      Admit Date: 10/23/2023    Discharge Date and Time: 10/24/2023     Attending Physician: Gilberto Whiteside MD    Principal Diagnoses: Status post hysterectomy with oophorectomy    Active Hospital Problems    Diagnosis  POA    *Status post hysterectomy with oophorectomy [Z90.710, Z90.721]  Unknown      Resolved Hospital Problems   No resolved problems to display.       Procedures: Procedure(s) (LRB):  LAPAROTOMY, EXPLORATORY (N/A)  HYSTERECTOMY, TOTAL, ABDOMINAL (N/A)  SALPINGO-OOPHORECTOMY (Bilateral)    Discharged Condition: good    Hospital Course:   Bhavani Hill is a 68 y.o. y.o.  female who presented on 10/23/2023   for above procedures for the treatment of adnexal mass. Intraoperative frozen pathology mucinous cystadenoma. Patient tolerated procedure. Post-operative course was uncomplicated.  On day of discharge, patient was urinating, ambulating, and tolerating a regular diet without difficulty. Pain was well controlled on PO medication. She was discharged home on POD#1 in stable condition with instructions to follow up with Dr. Gilberto Whiteside as scheduled.     Consults: None    Significant Diagnostic Studies:  Recent Labs   Lab 10/19/23  1035 10/24/23  0454   WBC 5.36 7.43   HGB 13.7 10.5*   HCT 41.4 31.1*   MCV 84 83    167          Treatments:   1. Surgery as above    Disposition: Home or Self Care    Patient Instructions:   Current Discharge Medication List        START taking these medications    Details   acetaminophen (TYLENOL) 500 MG tablet Take 2 tablets (1,000 mg total) by mouth every 6 (six) hours. (Take every 3 hours alternating with Ibuprofen)  Qty: 60 tablet, Refills: 2    Comments: Take every 3 hours alternating with Ibuprofen      ibuprofen (ADVIL,MOTRIN) 600 MG tablet Take 1 tablet (600 mg total) by mouth every 6 (six) hours. (Take every 3 hours alternating with Tylenol)  Qty: 60 tablet, Refills: 2    Comments: Take every 3 hours alternating  with Tylenol      oxyCODONE (ROXICODONE) 5 MG immediate release tablet Take 1 tablet (5 mg total) by mouth every 4 (four) hours as needed.  Qty: 20 tablet, Refills: 0    Comments: Quantity prescribed more than 7 day supply? No      senna (SENOKOT) 8.6 mg tablet Take 1 tablet by mouth 2 (two) times a day.  Qty: 60 tablet, Refills: 2           CONTINUE these medications which have NOT CHANGED    Details   ALPRAZolam (XANAX) 0.5 MG tablet Take 1 tablet (0.5 mg total) by mouth daily as needed for Anxiety.  Qty: 15 tablet, Refills: 1    Associated Diagnoses: Other situational type phobia      ascorbic acid, vitamin C, (VITAMIN C) 1000 MG tablet Take 1,000 mg by mouth once daily.      cyanocobalamin (VITAMIN B-12) 1000 MCG tablet Take 3,000 mcg by mouth once daily.      docusate sodium (COLACE) 100 MG capsule Take 100 mg by mouth once daily.      hydrocortisone (ANUSOL-HC) 2.5 % rectal cream Place rectally 2 (two) times daily.  Qty: 28 g, Refills: 0      inulin (FIBER GUMMIES) 2 gram Chew Take 1 Piece by mouth once daily.      iron polysaccharides (FERREX 150) 150 mg iron Cap Take 150 mg by mouth once daily.      levothyroxine (SYNTHROID) 50 MCG tablet Take 1 tablet (50 mcg total) by mouth once daily.  Qty: 90 tablet, Refills: 3    Associated Diagnoses: Acquired hypothyroidism      lisinopriL-hydrochlorothiazide (PRINZIDE,ZESTORETIC) 20-12.5 mg per tablet Take 2 tablets by mouth once daily.  Qty: 180 tablet, Refills: 3    Comments: .  Associated Diagnoses: Essential hypertension      metoprolol succinate (TOPROL-XL) 25 MG 24 hr tablet Take 1 tablet (25 mg total) by mouth once daily.  Qty: 90 tablet, Refills: 3    Comments: .  Associated Diagnoses: Essential hypertension             Discharge Procedure Orders   Diet general     Lifting restrictions   Order Comments: No lifting greater than 15 pounds for six weeks.     Other restrictions (specify):   Order Comments: PELVIC REST (IF YOU HAD A HYSTERECTOMY):  No douching,  tampons, or intercourse for 6 weeks.    If prescribed, vaginal estrogen cream may be used during the postoperative period.     DRIVING:  No driving while on narcotics. Driving may be resumed initially with a competent passenger one to two weeks after surgery if no longer taking narcotics.     EXERCISE:  For six weeks your exercise should be limited to walking. You may walk as far as you wish, as long as you increase your level of exertion gradually and avoid slippery surfaces. You may climb stairs as needed to get around, but should not use stair climbing for exercise.     Remove dressing in 24 hours   Order Comments: If you have a bandage on wound, you may remove it the day after dismissal.  If you had steri-strips remove them once they begin to peel off (usually 2 weeks).  If your steri-strips still haven't come off in 2 weeks, please remove them.  Keep incision clean and dry.  Inspect the incision daily for signs and symptoms of infection.     Wound care routine (specify)   Order Comments: WOUND CARE:  If you have a band-aid or bandage on your wound, you may remove it the day after dismissal.  You may wash the wound with mild soap and water.   You may shower at any time but should avoid immersing any abdominal incisions in water for at least two weeks after surgery or until the wound is completely healed. If given, please shower with Hibiclens soap until bottle is completely finished. Keep your wound clean and dry.  You should observe your incision for signs of infection which include redness, warmth, drainage or fever.     Call MD for:  temperature >100.4     Call MD for:  persistent nausea and vomiting     Call MD for:  severe uncontrolled pain     Call MD for:  difficulty breathing, headache or visual disturbances     Call MD for:  redness, tenderness, or signs of infection (pain, swelling, redness, odor or green/yellow discharge around incision site)     Call MD for:  hives     Call MD for:   Order Comments:  inability to void,urine is ketchup colored or you have large clots, vaginal bleeding is heavier than a period.    VAGINAL DISCHARGE: You may develop a vaginal discharge and intermittent vaginal spotting after surgery and up to 6 weeks postoperatively.  The discharge may have an odor and may change in color but it is normal.  This is due to dissolving stiches.  Contact your surgical team if you develop vaginal or vulvar irritation along with a discharge.  Also contact your surgical team if you have vaginal discharge that smells like urine or stool.    CONSTIPATION REMEDIES: Patients are often constipated after surgery or with use of oral narcotic medicine. You should continue to take the stool softener, Senokot-S during the next six weeks, and consume adequate amounts of water.  If you have not had a bowel movement for 3 days after dismissal, or are uncomfortable and unable to pass stool, please try one or all of the following measures:  1.  Milk of Magnesia - 30 cc by mouth every 12 hours   2.  Dulcolax suppository - One suppository per rectum every 4-6 hours   3.  Metamucil, Fibercon or other bulk former - use as directed  4.  Fleets Enema      PAIN MEDICATIONS:     Take your pain medications as instructed. It is best to take pain medications before your pain becomes severe. This will allow you to take less medication yet have better pain relief. For the first 2 or 3 days it may be helpful to take your pain medications on a regular schedule (e.g. every 4 to 6 hours). This will help you to keep your pain under better control. You should then begin to take fewer medications each day until you no longer need them. Do not take pain medication on an empty stomach. This may lead to nausea and vomiting.     Activity as tolerated   Order Comments: PELVIC REST:  No douching, tampons, or intercourse for 6 weeks.    If prescribed, vaginal estrogen cream may be used during the postoperative period.     DRIVING:  No driving  while on narcotics. Driving may be resumed initially with a competent passenger one to two weeks after surgery if no longer taking narcotics.     EXERCISE:  For six weeks your exercise should be limited to walking. You may walk as far as you wish, as long as you increase your level of exertion gradually and avoid slippery surfaces. You may climb stairs as needed to get around, but should not use stair climbing for exercise.     Shower on day dressing removed (No bath)   Order Comments: You may shower at any time but should avoid immersing any abdominal incisions in water for at least 2 weeks after surgery or until the wound is completely healed.  If given, please shower with Hibaclens soap until bottle is completely finish        Follow-up Information       Gilberto Whiteside MD Follow up.    Specialty: Gynecologic Oncology  Why: Post operative appointment  Contact information:  [address] [id number] [phone]                             Galina Lentz MD  OBGYN, PGY-3

## 2023-10-24 NOTE — PROGRESS NOTES
[facility] ([address])  Obstetrics & Gynecology  Progress Note    Patient Name: Bhavani Hill  MRN: [id number]  Admission Date: 10/23/2023  Primary Care Provider: Lisandro Cox MD  Principal Problem: Status post hysterectomy with oophorectomy    Subjective:     Interval History: POD #1 s/p RAMIRO/BSO for a pelvic mass. Frozen pathology consistent with mucinous cystadenoma.    NAEO. Pain controlled overnight. Tolerating small amounts PO without N/V. Has not ambulated. Voiding via mccann catheter. -flatus, -BM.    Scheduled Meds:   acetaminophen  1,000 mg Oral Q6H    heparin (porcine)  5,000 Units Subcutaneous Q8H    ketorolac  15 mg Intravenous Q6H    Followed by    ibuprofen  200 mg Oral Q6H    iron polysaccharides  150 mg Oral Daily    levothyroxine  50 mcg Oral Daily    magnesium hydroxide 400 mg/5 ml  30 mL Oral BID    metoprolol succinate  25 mg Oral Daily    mupirocin   Nasal BID    senna  8.6 mg Oral BID     Continuous Infusions:   lactated ringers 40 mL/hr at 10/23/23 1516     PRN Meds:ALPRAZolam, calcium carbonate, diphenhydrAMINE, hydrALAZINE, HYDROmorphone, melatonin, naloxone, ondansetron, oxyCODONE, oxyCODONE, prochlorperazine, simethicone    Review of patient's allergies indicates:   Allergen Reactions    Trazodone Other (See Comments)     Hyperactivity, personality/mood change    Guaifenesin Other (See Comments)     Eyes and nose water       Objective:     Vital Signs (Most Recent):  Temp: 98.5 °F (36.9 °C) (10/24/23 0434)  Pulse: 63 (10/24/23 0434)  Resp: 18 (10/24/23 0434)  BP: (!) 122/57 (10/24/23 0434)  SpO2: 96 % (10/24/23 0434) Vital Signs (24h Range):  Temp:  [96.2 °F (35.7 °C)-98.5 °F (36.9 °C)] 98.5 °F (36.9 °C)  Pulse:  [62-73] 63  Resp:  [18-20] 18  SpO2:  [95 %-99 %] 96 %  BP: (122-183)/(57-89) 122/57     Weight: 72.6 kg (160 lb)  Body mass index is 27.46 kg/m².  No LMP recorded. Patient is postmenopausal.    I&O (Last 24H):    Intake/Output Summary (Last 24 hours) at  10/24/2023 0614  Last data filed at 10/24/2023 0607  Gross per 24 hour   Intake 1600 ml   Output 800 ml   Net 800 ml       Physical Exam:   Constitutional: She is oriented to person, place, and time. She appears well-developed and well-nourished. No distress.    HENT:   Head: Normocephalic and atraumatic.    Eyes: EOM are normal.     Cardiovascular:  Normal rate.             Pulmonary/Chest: Effort normal.        Abdominal: Soft. She exhibits abdominal incision (infraumbilical vertical midline skin incision with steri strips in place). There is abdominal tenderness (appropriate postop tenderness). There is no rebound and no guarding.             Musculoskeletal: Normal range of motion.       Neurological: She is alert and oriented to person, place, and time.    Skin: Skin is warm and dry.    Psychiatric: She has a normal mood and affect. Her behavior is normal.       Laboratory:  Recent Results (from the past 24 hour(s))   POCT glucose    Collection Time: 10/23/23  7:24 AM   Result Value Ref Range    POCT Glucose 97 70 - 110 mg/dL   CBC auto differential    Collection Time: 10/24/23  4:54 AM   Result Value Ref Range    WBC 7.43 3.90 - 12.70 K/uL    RBC 3.74 (L) 4.00 - 5.40 M/uL    Hemoglobin 10.5 (L) 12.0 - 16.0 g/dL    Hematocrit 31.1 (L) 37.0 - 48.5 %    MCV 83 82 - 98 fL    MCH 28.1 27.0 - 31.0 pg    MCHC 33.8 32.0 - 36.0 g/dL    RDW 14.0 11.5 - 14.5 %    Platelets 167 150 - 450 K/uL    MPV 11.3 9.2 - 12.9 fL    Immature Granulocytes 0.4 0.0 - 0.5 %    Gran # (ANC) 5.0 1.8 - 7.7 K/uL    Immature Grans (Abs) 0.03 0.00 - 0.04 K/uL    Lymph # 1.6 1.0 - 4.8 K/uL    Mono # 0.8 0.3 - 1.0 K/uL    Eos # 0.0 0.0 - 0.5 K/uL    Baso # 0.02 0.00 - 0.20 K/uL    nRBC 0 0 /100 WBC    Gran % 67.3 38.0 - 73.0 %    Lymph % 21.5 18.0 - 48.0 %    Mono % 10.4 4.0 - 15.0 %    Eosinophil % 0.1 0.0 - 8.0 %    Basophil % 0.3 0.0 - 1.9 %    Differential Method Automated    Basic metabolic panel    Collection Time: 10/24/23  5:02 AM    Result Value Ref Range    Sodium 129 (L) 136 - 145 mmol/L    Potassium 2.9 (L) 3.5 - 5.1 mmol/L    Chloride 95 95 - 110 mmol/L    CO2 26 23 - 29 mmol/L    Glucose 111 (H) 70 - 110 mg/dL    BUN 10 8 - 23 mg/dL    Creatinine 0.7 0.5 - 1.4 mg/dL    Calcium 7.8 (L) 8.7 - 10.5 mg/dL    Anion Gap 8 8 - 16 mmol/L    eGFR >60 >60 mL/min/1.73 m^2       Diagnostic Results:  N/a    Assessment/Plan:     Active Diagnoses:    Diagnosis Date Noted POA    PRINCIPAL PROBLEM:  Status post hysterectomy with oophorectomy [Z90.710, Z90.721] 10/23/2023 Unknown      Problems Resolved During this Admission:       Postoperative state   - Procedure complications: none  - Pathology: frozen pathology consistent with mucinous cystadenoma   - EBL: 100 mL  - IVF @ 40 cc/h; d/c when tolerating PO  - Diet: regular  - Pain control: toradol > ibuprofen, tylenol, oxy PRN, dilaudid BTP  - In/Out: Santos in place draining clear urine; d/c Santos in AM POD #1  - Bowel function: -flatus/-BM   - PRN: simethicone, zofran, phenergan, benadryl  - Heme: 14/41 > EBL 100cc > 10.5/31  - PPX: ambulation encouraged, IS encouraged, TEDs/SCDs in place, heparin > lovenox on POD #1    Electrolyte abnormalities   - Na 129, K 2.9 on AM BMP  - 500cc normal saline bolus and PO K replacement ordered     HTN  - BP as above   - Continue home metroprolol, HCTZ held in immediate postoperative state     Hypothyroidism   - Continue home synthroid     Anxiety   - Continue home xanax prn     Galina Lentz MD  Obstetrics & Gynecology  [facility] ([address])

## 2023-10-24 NOTE — CARE UPDATE
10/24/23 0750   PRE-TX-O2   Device (Oxygen Therapy) room air   SpO2 98 %   Pulse Oximetry Type Intermittent   $ Pulse Oximetry - Multiple Charge Pulse Oximetry - Multiple   Incentive Spirometer   $ Incentive Spirometer Charges done with encouragement   Incentive Spirometer Predicted Level (mL) 2000   Administration (IS) instruction provided, follow-up   Number of Repetitions (IS) 10   Level Incentive Spirometer (mL) 2000   Patient Tolerance (IS) good

## 2023-10-25 ENCOUNTER — TELEPHONE (OUTPATIENT)
Dept: GYNECOLOGIC ONCOLOGY | Facility: CLINIC | Age: 68
End: 2023-10-25
Payer: MEDICARE

## 2023-10-25 LAB
FINAL PATHOLOGIC DIAGNOSIS: NORMAL
Lab: NORMAL

## 2023-10-25 NOTE — TELEPHONE ENCOUNTER
R/t call to pt and informed her that it is fine to take Gas X if needed. Understanding verbalized.     ----- Message from Mildred Camejo sent at 10/25/2023  4:22 PM CDT -----  Regarding: medication advice  Name of caller: darrion       What is the requesting detail: pt is wondering if it is safe to take gas x ? Thinks this may prevent her from having to take pain pills.  Please advise       Can the clinic reply by [facility]:       What number to call back:[phone]

## 2023-10-26 ENCOUNTER — NURSE TRIAGE (OUTPATIENT)
Dept: ADMINISTRATIVE | Facility: CLINIC | Age: 68
End: 2023-10-26
Payer: MEDICARE

## 2023-10-26 ENCOUNTER — PATIENT OUTREACH (OUTPATIENT)
Dept: ADMINISTRATIVE | Facility: CLINIC | Age: 68
End: 2023-10-26
Payer: MEDICARE

## 2023-10-26 NOTE — TELEPHONE ENCOUNTER
"Pt is transferred to this NT from post-discharge nurse April Araujo. Pt is post-op hysterectomy salpingo-oophorectomy and 10/23 and has vomited x3 today. Pt reports that she has been "spitting up" bile since the surgery and has not been able to eat normally.     Care Advice recommends ED now. Pt verbalizes understanding and is instructed to call back with any new/worsening sxs, questions, or concerns.   Reason for Disposition   Vomiting and persists > 4 hours    Additional Information   Negative: Sounds like a life-threatening emergency to the triager   Negative: Bright red, wide-spread, sunburn-like rash   Negative: SEVERE headache and after spinal (epidural) anesthesia    Protocols used: Post-Op Symptoms and [id number]    "

## 2023-10-26 NOTE — PROGRESS NOTES
C3 nurse spoke with Bhavani Hill and spouse, Eliel for a TCC post hospital discharge follow up call. The patient has a scheduled [facility] appointment with [facility], Sheyla Douglas NP on 10/27/2023 @ 0800.

## 2023-10-27 ENCOUNTER — OFFICE VISIT (OUTPATIENT)
Dept: HOME HEALTH SERVICES | Facility: CLINIC | Age: 68
End: 2023-10-27
Payer: MEDICARE

## 2023-10-27 DIAGNOSIS — R11.2 NAUSEA AND VOMITING, UNSPECIFIED VOMITING TYPE: ICD-10-CM

## 2023-10-27 DIAGNOSIS — Z90.721 STATUS POST HYSTERECTOMY WITH OOPHORECTOMY: Primary | ICD-10-CM

## 2023-10-27 DIAGNOSIS — Z90.710 STATUS POST HYSTERECTOMY WITH OOPHORECTOMY: Primary | ICD-10-CM

## 2023-10-27 DIAGNOSIS — R10.84 ABDOMINAL CRAMPING, GENERALIZED: ICD-10-CM

## 2023-10-27 LAB
FINAL PATHOLOGIC DIAGNOSIS: NORMAL
FROZEN SECTION DIAGNOSIS: NORMAL
FROZEN SECTION FOOTNOTE: NORMAL
GROSS: NORMAL
Lab: NORMAL

## 2023-10-27 PROCEDURE — 3079F DIAST BP 80-89 MM HG: CPT | Mod: CPTII,S$GLB,,

## 2023-10-27 PROCEDURE — 99496 TRANSJ CARE MGMT HIGH F2F 7D: CPT | Mod: S$GLB,,,

## 2023-10-27 PROCEDURE — 1160F RVW MEDS BY RX/DR IN RCRD: CPT | Mod: CPTII,S$GLB,,

## 2023-10-27 PROCEDURE — 1111F PR DISCHARGE MEDS RECONCILED W/ CURRENT OUTPATIENT MED LIST: ICD-10-PCS | Mod: CPTII,S$GLB,,

## 2023-10-27 PROCEDURE — 3079F PR MOST RECENT DIASTOLIC BLOOD PRESSURE 80-89 MM HG: ICD-10-PCS | Mod: CPTII,S$GLB,,

## 2023-10-27 PROCEDURE — 1160F PR REVIEW ALL MEDS BY PRESCRIBER/CLIN PHARMACIST DOCUMENTED: ICD-10-PCS | Mod: CPTII,S$GLB,,

## 2023-10-27 PROCEDURE — 99496 TRANSITIONAL CARE MANAGE SERVICE 7 DAY DISCHARGE: ICD-10-PCS | Mod: S$GLB,,,

## 2023-10-27 PROCEDURE — 4010F PR ACE/ARB THEARPY RXD/TAKEN: ICD-10-PCS | Mod: CPTII,S$GLB,,

## 2023-10-27 PROCEDURE — 1159F PR MEDICATION LIST DOCUMENTED IN MEDICAL RECORD: ICD-10-PCS | Mod: CPTII,S$GLB,,

## 2023-10-27 PROCEDURE — 1159F MED LIST DOCD IN RCRD: CPT | Mod: CPTII,S$GLB,,

## 2023-10-27 PROCEDURE — 4010F ACE/ARB THERAPY RXD/TAKEN: CPT | Mod: CPTII,S$GLB,,

## 2023-10-27 PROCEDURE — 1111F DSCHRG MED/CURRENT MED MERGE: CPT | Mod: CPTII,S$GLB,,

## 2023-10-27 PROCEDURE — 3075F SYST BP GE 130 - 139MM HG: CPT | Mod: CPTII,S$GLB,,

## 2023-10-27 PROCEDURE — 3075F PR MOST RECENT SYSTOLIC BLOOD PRESS GE 130-139MM HG: ICD-10-PCS | Mod: CPTII,S$GLB,,

## 2023-10-29 VITALS
DIASTOLIC BLOOD PRESSURE: 80 MMHG | RESPIRATION RATE: 18 BRPM | SYSTOLIC BLOOD PRESSURE: 138 MMHG | OXYGEN SATURATION: 98 % | HEART RATE: 73 BPM | TEMPERATURE: 98 F

## 2023-10-29 NOTE — PROGRESS NOTES
[facility]  Transitional Care Management (TCM) Home Visit    Encounter Provider: ZEESHAN BANERJEE,   PCP: Lisandro Cox MD  Consult Requested By: No ref. provider found  Admit Date: 10/23/23   IP Discharge Date: 10/24/23  Hospital Length of Stay: 1 days  Days since discharge (from IP or SNF): 2 days  [facility] Contact Note: 10/26  Hospital Diagnosis: Status post hysterectomy with oophorectomy    HISTORY OF PRESENT ILLNESS      Patient ID: Bhavani Hill is a 68 y.o. female was recently admitted to the hospital, this is their TCM encounter.    Hospital Course Synopsis:  Status post hysterectomy with oophorectomy for adnexal mass.     Developments since hospitalization and current needs:   She returned to the ER yesterday, 10/26, for abdominal pain and multiple episodes of vomiting. Her CT scan was unremarkable. She was given a 1L NS Bolus, IV zofran and bentyl. She felt significantly better and was discharged a few hours later.     She is continuing to feel much better today. No further episodes of vomiting or abdominal cramping. Vertical midline lower abdominal incision well approximated, closed with steristrips, healing well, no SS of infection.       DECISION MAKING TODAY       Assessment & Plan:  1. Status post hysterectomy with oophorectomy  Assessment & Plan:  -Discussed medications in length  -Liquid diet today, advance as tolerated.   -Prioritize rest, advance as tolerated.     2. Abdominal cramping, generalized   -Bentyl as needed   -GasX as needed   -Advance activity as tolerated.     3. Nausea and vomiting, unspecified vomiting type   -Zofran as needed   -Liquid diet, soups today, advance as tolerated         ENVIRONMENT OF CARE      Family and/or Caregiver present at visit?  Yes  Name of Caregiver:  Ray  History provided by: patient and caregiver    Advance Care Planning   Advanced Care Planning Status:  Patient has had an ACP conversation  Living Will: No  Power of :  No  LaPOST: No    Does Caregiver have HCPoA: No  Changes today: n/a       Impression upon entering the home:  Physical Dwelling: single family home   Appearance of home environment: cleaniness: clean, walking pathways: clear, lighting: adequate, and home structure: sound structure  Functional Status: independent  Mobility: ambulatory  Nutritional access: adequate intake and access  Home Health: No, and does not need it at this time   DME/Supplies: none     Diagnostic tests reviewed/disposition: No diagnosic tests pending after this hospitalization.  Disease/illness education:  s/p hysterectomy care   Establishment or re-establishment of referral orders for community resources: No other necessary community resources.   Discussion with other health care providers: No discussion with other health care providers necessary.   Does patient have a PCP at [facility]? Yes   Repatriation plan with PCP? follow-up with PCP within 90d   Does patient have an ostomy (ileostomy, colostomy, suprapubic catheter, nephrostomy tube, tracheostomy, PEG tube, pleurex catheter, cholecystostomy, etc)? No  Were BPAs reviewed? Yes    Social History     Socioeconomic History    Marital status:      Spouse name: Eliel   Occupational History    Occupation: teacher   Tobacco Use    Smoking status: Never    Smokeless tobacco: Never   Substance and Sexual Activity    Alcohol use: Yes     Alcohol/week: 0.0 standard drinks of alcohol     Comment: 1 martini daily    Drug use: Never    Sexual activity: Yes     Birth control/protection: Post-menopausal         OBJECTIVE:     Vital Signs:  Vitals:    10/27/23 0830   BP: 138/80   Pulse: 73   Resp: 18   Temp: 97.6 °F (36.4 °C)       Review of Systems   Constitutional:  Negative for activity change, appetite change and fever.   HENT: Negative.     Eyes: Negative.    Respiratory:  Negative for cough and shortness of breath.    Cardiovascular:  Negative for chest pain and leg swelling.   Gastrointestinal:   Positive for abdominal pain, constipation and vomiting. Negative for diarrhea and nausea.   Genitourinary:  Negative for difficulty urinating and hematuria.   Musculoskeletal:  Negative for joint swelling and neck stiffness.   Skin:  Positive for wound. Negative for color change.   Neurological:  Negative for dizziness, numbness and headaches.       Physical Exam:  Physical Exam  Constitutional:       Appearance: Normal appearance.   HENT:      Head: Normocephalic and atraumatic.   Cardiovascular:      Rate and Rhythm: Normal rate and regular rhythm.      Pulses: Normal pulses.      Heart sounds: Normal heart sounds.   Pulmonary:      Effort: Pulmonary effort is normal.      Breath sounds: Normal breath sounds.   Abdominal:      General: Bowel sounds are normal. There is no distension.      Palpations: Abdomen is soft.      Tenderness: There is generalized abdominal tenderness.   Musculoskeletal:         General: Normal range of motion.      Cervical back: Normal range of motion and neck supple.   Skin:     General: Skin is warm and dry.      Findings: Wound present.      Comments: Vertical midline lower abdominal incision well approximated, closed with steristrips, healing well, no SS of infection.   Neurological:      General: No focal deficit present.      Mental Status: She is alert and oriented to person, place, and time. Mental status is at baseline.   Psychiatric:         Mood and Affect: Mood normal.         Behavior: Behavior normal.         INSTRUCTIONS FOR PATIENT:   Medication List on Discharge:     Medication List            Accurate as of October 27, 2023 11:59 PM. If you have any questions, ask your nurse or doctor.                CONTINUE taking these medications      acetaminophen 500 MG tablet  Commonly known as: TYLENOL  Take 2 tablets (1,000 mg total) by mouth every 6 (six) hours. (Take every 3 hours alternating with Ibuprofen)     ALPRAZolam 0.5 MG tablet  Commonly known as: XANAX  Take 1 tablet  (0.5 mg total) by mouth daily as needed for Anxiety.     dicyclomine 20 mg tablet  Commonly known as: BENTYL  Take 1 tablet (20 mg total) by mouth 4 (four) times daily. for 5 days     docusate sodium 100 MG capsule  Commonly known as: COLACE  Take 100 mg by mouth once daily.     FERREX 150 150 mg iron Cap  Generic drug: iron polysaccharides  Take 150 mg by mouth once daily.     FIBER GUMMIES 2 gram Chew  Generic drug: inulin  Take 1 Piece by mouth once daily.     hydrocortisone 2.5 % rectal cream  Commonly known as: ANUSOL-HC  Place rectally 2 (two) times daily.     ibuprofen 600 MG tablet  Commonly known as: ADVIL,MOTRIN  Take 1 tablet (600 mg total) by mouth every 6 (six) hours. (Take every 3 hours alternating with Tylenol)     levothyroxine 50 MCG tablet  Commonly known as: SYNTHROID  Take 1 tablet (50 mcg total) by mouth once daily.     lisinopriL-hydrochlorothiazide 20-12.5 mg per tablet  Commonly known as: PRINZIDE,ZESTORETIC  Take 2 tablets by mouth once daily.     metoprolol succinate 25 MG 24 hr tablet  Commonly known as: TOPROL-XL  Take 1 tablet (25 mg total) by mouth once daily.     ondansetron 4 MG tablet  Commonly known as: ZOFRAN  Take 1 tablet (4 mg total) by mouth every 6 (six) hours as needed for Nausea.     oxyCODONE 5 MG immediate release tablet  Commonly known as: ROXICODONE  Take 1 tablet (5 mg total) by mouth every 4 (four) hours as needed.     SENNA 8.6 mg tablet  Generic drug: senna  Take 1 tablet by mouth 2 (two) times a day.     VITAMIN B-12 1000 MCG tablet  Generic drug: cyanocobalamin  Take 3,000 mcg by mouth once daily.     VITAMIN C 1000 MG tablet  Generic drug: ascorbic acid (vitamin C)  Take 1,000 mg by mouth once daily.              Medication Reconciliation:  Were medications changed on discharge? Yes  Were medications in the home? Yes  Is the patient taking the medications as directed? Yes  Does the patient/caregiver understand the medications and changes? Yes  Does updated med  list accurately reflects meds patient is currently taking? Yes      Scheduled Follow-up, Appts Reviewed with Modifications if Needed: Yes  Future Appointments   Date Time Provider Department Center   11/28/2023 11:00 AM Gilberto Whiteside MD [facility] GYN ONC [address]   2/20/2024 10:20 AM Lisandro Cox MD [facility]   2/28/2024 11:25 AM LAB, [facility] OHS DRAW STATION [facility] LAB OHS at [facility]   3/6/2024 11:40 AM Tanya Perez MD [facility] OHS at [facility]         Signature:      ZEESHAN BANERJEE,, NP    Transition of Care Visit:  I have reviewed and updated the history and problem list.  I have reconciled the medication list.  I have discussed the hospitalization and current medical issues, prognosis and plans with the patient/family.

## 2023-10-29 NOTE — ASSESSMENT & PLAN NOTE
-Discussed medications in length  -Liquid diet today, advance as tolerated.   -Prioritize rest, advance as tolerated.

## 2023-10-30 ENCOUNTER — TELEPHONE (OUTPATIENT)
Dept: PRIMARY CARE CLINIC | Facility: CLINIC | Age: 68
End: 2023-10-30
Payer: MEDICARE

## 2023-10-30 NOTE — PHYSICIAN QUERY
PT Name: Bhavani Hill  MR #: [id number]     DOCUMENTATION CLARIFICATION     CDS/: ONI Gonsalves,RNC-MNN        Contact information:[email or web address]  This form is a permanent document in the medical record.    Query Date: October 30, 2023    By submitting this query, we are merely seeking further clarification of documentation to reflect the severity of illness of your patient. Please utilize your independent clinical judgment when addressing the question(s) below.    The Medical Record reflects the following:     Indicators   Supporting Clinical Findings Location in Medical Record   X Lab Value(s) Na 129, K 2.9 on AM BMP Gyn Onc Progress note 10/25@645pm   X Treatment/Medication 500cc normal saline bolus and PO K replacement ordered  Gyn Onc Progress note 10/25@645pm   X Other Electrolyte abnormalities  Gyn Onc Progress note 10/25@645pm     Provider, please specify the diagnosis or diagnoses that correspond(s) to the above indicators. Antoine all that apply:    [  X ] Hypokalemia   [   X] Hyponatremia   [   ] Other electrolyte disturbance (please specify): __________   [   ]  Clinically Undetermined       Please document in your progress notes daily for the duration of treatment until resolved, and include in your discharge summary.

## 2023-10-31 ENCOUNTER — TELEPHONE (OUTPATIENT)
Dept: GYNECOLOGIC ONCOLOGY | Facility: CLINIC | Age: 68
End: 2023-10-31
Payer: MEDICARE

## 2023-10-31 DIAGNOSIS — G89.18 POST-OP PAIN: Primary | ICD-10-CM

## 2023-10-31 RX ORDER — OXYCODONE HYDROCHLORIDE 5 MG/1
5 TABLET ORAL EVERY 8 HOURS PRN
Qty: 10 TABLET | Refills: 0 | Status: SHIPPED | OUTPATIENT
Start: 2023-10-31 | End: 2024-03-06

## 2023-10-31 NOTE — TELEPHONE ENCOUNTER
R/t call to pt and informed her that a Rx refill was sent in on yesterday evening and to check with her pharmacy [facility].    ----- Message from Candis Chadwick sent at 10/31/2023 11:04 AM CDT -----  Regarding: medication concerns  Name of Who is Calling: Eliel, spouse           What is the request in detail: Eliel is requesting a call back to discuss some medication patient is taking and if she need to get refills on any of them.            Can the clinic reply by [facility]: No           What Number to Call Back if not in [facility]: [phone]

## 2023-10-31 NOTE — TELEPHONE ENCOUNTER
----- Message from Joselin Richards NP sent at 10/31/2023  6:51 AM CDT -----  Hey I refilled but #10 to space to q8hr if you could please  re: ATC Tylenol and Ibuprofen and Oxy just for breakthrough as she weans.  Thanks    ----- Message -----  From: Tari Bell RN  Sent: 10/30/2023   4:30 PM CDT  To: Joselin Richards NP      ----- Message -----  From: Ruben Martínez  Sent: 10/30/2023   4:14 PM CDT  To: Stevo Macias Staff    Who Called:        Refill or New Rx: refill   RX Name and Strength:  oxyCODONE (ROXICODONE) 5 MG immediate release tablet                                          acetaminophen (TYLENOL) 500 MG tablet        Is this a 30 day or 90 day RX        Preferred Pharmacy with phone number:   [facility] [address] [place], [place] - [address] [facility]        Local or Mail Order: local           Would the patient rather a call back or a response via [facility]? Call back         Best Call Back Number:        Additional Information: patient will be out of medication tonight

## 2023-10-31 NOTE — TELEPHONE ENCOUNTER
----- Message from Tari Bell RN sent at 10/30/2023  4:30 PM CDT -----    ----- Message -----  From: Ruben Martínez  Sent: 10/30/2023   4:14 PM CDT  To: Stevo Macias Staff    Who Called:        Refill or New Rx: refill   RX Name and Strength:  oxyCODONE (ROXICODONE) 5 MG immediate release tablet                                          acetaminophen (TYLENOL) 500 MG tablet        Is this a 30 day or 90 day RX        Preferred Pharmacy with phone number:   [facility] [address] [place], [place] - [address] [facility]        Local or Mail Order: local           Would the patient rather a call back or a response via [facility]? Call back         Best Call Back Number:        Additional Information: patient will be out of medication tonight

## 2023-10-31 NOTE — TELEPHONE ENCOUNTER
----- Message from Sonia Lerner RN sent at 10/31/2023  3:04 PM CDT -----  Regarding: FW: Patient Advice    ----- Message -----  From: Julia Ansari  Sent: 10/31/2023   2:57 PM CDT  To: Stevo Macias Staff  Subject: Patient Advice                                               Reply in [facility]: NO      Please refill the medication listed below and resend to [facility]. Please call the patient  [phone] (m)         Medication      oxyCODONE (ROXICODONE) 5 MG immediate release tablet       Preferred Pharmacy: [facility] [address] [facility] [address] [address]   Phone: [phone]  Fax:  [phone]

## 2023-10-31 NOTE — TELEPHONE ENCOUNTER
Spoke w/pt and informed her that Rx refill was sent to [facility]. Instructed pt to try and space Oxycodone out to every 8 hrs and to alternate w/Tylenol and Ibuprofen. UV.     ----- Message from Joselin Richards NP sent at 10/31/2023  6:51 AM CDT -----  Hey I refilled but #10 to space to q8hr if you could please  re: ATC Tylenol and Ibuprofen and Oxy just for breakthrough as she weans.  Thanks    ----- Message -----  From: Tari Bell RN  Sent: 10/30/2023   4:30 PM CDT  To: Joselin Richrads NP      ----- Message -----  From: Ruben Martínez  Sent: 10/30/2023   4:14 PM CDT  To: Stevo Macias Staff    Who Called:        Refill or New Rx: refill   RX Name and Strength:  oxyCODONE (ROXICODONE) 5 MG immediate release tablet                                          acetaminophen (TYLENOL) 500 MG tablet        Is this a 30 day or 90 day RX        Preferred Pharmacy with phone number:   [facility] [address] [place], [place] - [address] [address]        Local or Mail Order: local           Would the patient rather a call back or a response via [facility]? Call back         Best Call Back Number:        Additional Information: patient will be out of medication tonight

## 2023-11-09 ENCOUNTER — TELEPHONE (OUTPATIENT)
Dept: GYNECOLOGIC ONCOLOGY | Facility: CLINIC | Age: 68
End: 2023-11-09
Payer: MEDICARE

## 2023-11-09 NOTE — TELEPHONE ENCOUNTER
Spoke with our patient about her  reschedule appointment she voiced understanding of the date, time and location. All questions answered appointment mail. Provider Scheduling Coord.  Gynecologic Oncology MA/ZURI /Preceptor Juan Maloney----- Message from Tari Bell RN sent at 11/9/2023 12:18 PM CST -----    ----- Message -----  From: Ruben Martínez  Sent: 11/9/2023  12:15 PM CST  To: Stevo Macias Staff    Type:  Patient Returning Call    Who Called:     Who Left Message for Patient:     Does the patient know what this is regarding?: missed call     Best Call Back Number:  [phone]    Additional Information:

## 2023-12-01 NOTE — PROGRESS NOTES
POST-OP CLINIC NOTE    SUBJECTIVE:    Bhavani Hill is a 68 y.o. year old  patient who is s/p Open RAMIRO/BSO on 10/23/23.  Final pathology c/w L mucinous cystadenoma    She is eating a regular diet without difficulty. Bowel movements are normal. Pain is controlled.  Vaginal bleeding is normal.  Incision sites are clean, dry, and intact without evidence of infection.    REVIEW OF SYSTEMS  All systems reviewed and negative except as noted in HPI.    OBJECTIVE   There were no vitals filed for this visit.   Body mass index is 25.92 kg/m².      1. General: Well appearing, no apparent distress, alert and oriented.  2. Lymph: Neck symmetric without cervical or supraclavicular adenopathy or mass.  3. Lungs: Normal respiratory rate, no accessory muscle use.  4. Psych: Normal affect.  5. Abdomen:  non-distended, soft, non-tender, are no masses, no ascites, no hepatosplenomegaly. Incision sites are C/D/I without evidence of infection.  6. Skin: Warm, dry, no rashes or lesions.   7. Extremities: Bilateral lower extremities without edema or tenderness.  8. Genitourinary               Pelvic Examination including:                a. External genitalia are normal in appearance. No lesions noted.               b. Urethral meatus is normal size, location, and appearance.               c. Urethra is negative.               d. Bladder is nontender. No masses noted.               e. Vagina has normal mucosa with physiologic discharge. No lesions noted. Vaginal cuff is intact              f. Uterus absent              g. Adnexa absent     ASSESSMENT/PLAN:    1. Adnexal mass    2. Essential hypertension    3. Hypothyroidism, unspecified type         Doing well post-operatively.  Operative findings again reviewed. Pathology report discussed.  F/U OBGYN.

## 2023-12-05 ENCOUNTER — OFFICE VISIT (OUTPATIENT)
Dept: GYNECOLOGIC ONCOLOGY | Facility: CLINIC | Age: 68
End: 2023-12-05
Payer: MEDICARE

## 2023-12-05 VITALS — WEIGHT: 151 LBS | BODY MASS INDEX: 25.78 KG/M2 | HEIGHT: 64 IN

## 2023-12-05 DIAGNOSIS — N94.89 ADNEXAL MASS: Primary | ICD-10-CM

## 2023-12-05 DIAGNOSIS — I10 ESSENTIAL HYPERTENSION: ICD-10-CM

## 2023-12-05 DIAGNOSIS — E03.9 HYPOTHYROIDISM, UNSPECIFIED TYPE: ICD-10-CM

## 2023-12-05 PROCEDURE — 99999 PR PBB SHADOW E&M-EST. PATIENT-LVL III: ICD-10-PCS | Mod: PBBFAC,,, | Performed by: OBSTETRICS & GYNECOLOGY

## 2023-12-05 PROCEDURE — 1126F PR PAIN SEVERITY QUANTIFIED, NO PAIN PRESENT: ICD-10-PCS | Mod: CPTII,S$GLB,, | Performed by: OBSTETRICS & GYNECOLOGY

## 2023-12-05 PROCEDURE — 99024 POSTOP FOLLOW-UP VISIT: CPT | Mod: S$GLB,,, | Performed by: OBSTETRICS & GYNECOLOGY

## 2023-12-05 PROCEDURE — 99024 PR POST-OP FOLLOW-UP VISIT: ICD-10-PCS | Mod: S$GLB,,, | Performed by: OBSTETRICS & GYNECOLOGY

## 2023-12-05 PROCEDURE — 1101F PR PT FALLS ASSESS DOC 0-1 FALLS W/OUT INJ PAST YR: ICD-10-PCS | Mod: CPTII,S$GLB,, | Performed by: OBSTETRICS & GYNECOLOGY

## 2023-12-05 PROCEDURE — 1159F PR MEDICATION LIST DOCUMENTED IN MEDICAL RECORD: ICD-10-PCS | Mod: CPTII,S$GLB,, | Performed by: OBSTETRICS & GYNECOLOGY

## 2023-12-05 PROCEDURE — 99999 PR PBB SHADOW E&M-EST. PATIENT-LVL III: CPT | Mod: PBBFAC,,, | Performed by: OBSTETRICS & GYNECOLOGY

## 2023-12-05 PROCEDURE — 1101F PT FALLS ASSESS-DOCD LE1/YR: CPT | Mod: CPTII,S$GLB,, | Performed by: OBSTETRICS & GYNECOLOGY

## 2023-12-05 PROCEDURE — 1126F AMNT PAIN NOTED NONE PRSNT: CPT | Mod: CPTII,S$GLB,, | Performed by: OBSTETRICS & GYNECOLOGY

## 2023-12-05 PROCEDURE — 4010F ACE/ARB THERAPY RXD/TAKEN: CPT | Mod: CPTII,S$GLB,, | Performed by: OBSTETRICS & GYNECOLOGY

## 2023-12-05 PROCEDURE — 4010F PR ACE/ARB THEARPY RXD/TAKEN: ICD-10-PCS | Mod: CPTII,S$GLB,, | Performed by: OBSTETRICS & GYNECOLOGY

## 2023-12-05 PROCEDURE — 3288F PR FALLS RISK ASSESSMENT DOCUMENTED: ICD-10-PCS | Mod: CPTII,S$GLB,, | Performed by: OBSTETRICS & GYNECOLOGY

## 2023-12-05 PROCEDURE — 3288F FALL RISK ASSESSMENT DOCD: CPT | Mod: CPTII,S$GLB,, | Performed by: OBSTETRICS & GYNECOLOGY

## 2023-12-05 PROCEDURE — 1159F MED LIST DOCD IN RCRD: CPT | Mod: CPTII,S$GLB,, | Performed by: OBSTETRICS & GYNECOLOGY

## 2023-12-06 ENCOUNTER — TELEPHONE (OUTPATIENT)
Dept: OBSTETRICS AND GYNECOLOGY | Facility: CLINIC | Age: 68
End: 2023-12-06
Payer: MEDICARE

## 2023-12-06 NOTE — TELEPHONE ENCOUNTER
Spoke with patient. Doing great. Will see me for annual exams. Very pleased with her experience with Dr. Whiteside.

## 2024-01-12 DIAGNOSIS — F40.248 OTHER SITUATIONAL TYPE PHOBIA: ICD-10-CM

## 2024-01-12 RX ORDER — ALPRAZOLAM 0.5 MG/1
TABLET ORAL
Qty: 15 TABLET | Refills: 0 | Status: SHIPPED | OUTPATIENT
Start: 2024-01-12

## 2024-01-17 ENCOUNTER — TELEPHONE (OUTPATIENT)
Dept: PRIMARY CARE CLINIC | Facility: CLINIC | Age: 69
End: 2024-01-17
Payer: MEDICARE

## 2024-01-17 DIAGNOSIS — E03.9 HYPOTHYROIDISM, UNSPECIFIED TYPE: ICD-10-CM

## 2024-01-17 DIAGNOSIS — I10 ESSENTIAL HYPERTENSION: Primary | ICD-10-CM

## 2024-01-17 NOTE — TELEPHONE ENCOUNTER
Pt came to clinic. She moved her appt to March 8. She is asking if you need labs. She is having some labs drawn on 2/28/24.    Please advise.

## 2024-01-19 NOTE — TELEPHONE ENCOUNTER
Left message for pt requesting a call back at earliest convenience. Need to know if she wants Dr. Cox's fasting labs added to her non-fasting labs on 2/28/24.

## 2024-02-26 ENCOUNTER — PATIENT MESSAGE (OUTPATIENT)
Dept: PRIMARY CARE CLINIC | Facility: CLINIC | Age: 69
End: 2024-02-26
Payer: MEDICARE

## 2024-02-28 ENCOUNTER — LAB VISIT (OUTPATIENT)
Dept: LAB | Facility: HOSPITAL | Age: 69
End: 2024-02-28
Attending: INTERNAL MEDICINE
Payer: MEDICARE

## 2024-02-28 DIAGNOSIS — E03.9 HYPOTHYROIDISM, UNSPECIFIED TYPE: ICD-10-CM

## 2024-02-28 DIAGNOSIS — I10 ESSENTIAL HYPERTENSION: ICD-10-CM

## 2024-02-28 DIAGNOSIS — D50.9 IRON DEFICIENCY ANEMIA, UNSPECIFIED IRON DEFICIENCY ANEMIA TYPE: ICD-10-CM

## 2024-02-28 LAB
ALBUMIN SERPL BCP-MCNC: 3.9 G/DL (ref 3.5–5.2)
ALBUMIN SERPL BCP-MCNC: 3.9 G/DL (ref 3.5–5.2)
ALP SERPL-CCNC: 72 U/L (ref 55–135)
ALP SERPL-CCNC: 72 U/L (ref 55–135)
ALT SERPL W/O P-5'-P-CCNC: 14 U/L (ref 10–44)
ALT SERPL W/O P-5'-P-CCNC: 14 U/L (ref 10–44)
ANION GAP SERPL CALC-SCNC: 10 MMOL/L (ref 8–16)
ANION GAP SERPL CALC-SCNC: 10 MMOL/L (ref 8–16)
AST SERPL-CCNC: 23 U/L (ref 10–40)
AST SERPL-CCNC: 23 U/L (ref 10–40)
BASOPHILS # BLD AUTO: 0.03 K/UL (ref 0–0.2)
BASOPHILS # BLD AUTO: 0.03 K/UL (ref 0–0.2)
BASOPHILS NFR BLD: 0.8 % (ref 0–1.9)
BASOPHILS NFR BLD: 0.8 % (ref 0–1.9)
BILIRUB SERPL-MCNC: 0.9 MG/DL (ref 0.1–1)
BILIRUB SERPL-MCNC: 0.9 MG/DL (ref 0.1–1)
BUN SERPL-MCNC: 11 MG/DL (ref 8–23)
BUN SERPL-MCNC: 11 MG/DL (ref 8–23)
CALCIUM SERPL-MCNC: 9.9 MG/DL (ref 8.7–10.5)
CALCIUM SERPL-MCNC: 9.9 MG/DL (ref 8.7–10.5)
CHLORIDE SERPL-SCNC: 100 MMOL/L (ref 95–110)
CHLORIDE SERPL-SCNC: 100 MMOL/L (ref 95–110)
CHOLEST SERPL-MCNC: 222 MG/DL (ref 120–199)
CHOLEST/HDLC SERPL: 2.5 {RATIO} (ref 2–5)
CO2 SERPL-SCNC: 26 MMOL/L (ref 23–29)
CO2 SERPL-SCNC: 26 MMOL/L (ref 23–29)
CREAT SERPL-MCNC: 0.8 MG/DL (ref 0.5–1.4)
CREAT SERPL-MCNC: 0.8 MG/DL (ref 0.5–1.4)
DIFFERENTIAL METHOD BLD: ABNORMAL
DIFFERENTIAL METHOD BLD: ABNORMAL
EOSINOPHIL # BLD AUTO: 0.1 K/UL (ref 0–0.5)
EOSINOPHIL # BLD AUTO: 0.1 K/UL (ref 0–0.5)
EOSINOPHIL NFR BLD: 1.6 % (ref 0–8)
EOSINOPHIL NFR BLD: 1.6 % (ref 0–8)
ERYTHROCYTE [DISTWIDTH] IN BLOOD BY AUTOMATED COUNT: 12.2 % (ref 11.5–14.5)
ERYTHROCYTE [DISTWIDTH] IN BLOOD BY AUTOMATED COUNT: 12.2 % (ref 11.5–14.5)
EST. GFR  (NO RACE VARIABLE): >60 ML/MIN/1.73 M^2
EST. GFR  (NO RACE VARIABLE): >60 ML/MIN/1.73 M^2
FERRITIN SERPL-MCNC: 20 NG/ML (ref 20–300)
GLUCOSE SERPL-MCNC: 107 MG/DL (ref 70–110)
GLUCOSE SERPL-MCNC: 107 MG/DL (ref 70–110)
HCT VFR BLD AUTO: 35.9 % (ref 37–48.5)
HCT VFR BLD AUTO: 35.9 % (ref 37–48.5)
HDLC SERPL-MCNC: 89 MG/DL (ref 40–75)
HDLC SERPL: 40.1 % (ref 20–50)
HGB BLD-MCNC: 12.3 G/DL (ref 12–16)
HGB BLD-MCNC: 12.3 G/DL (ref 12–16)
IMM GRANULOCYTES # BLD AUTO: 0 K/UL (ref 0–0.04)
IMM GRANULOCYTES # BLD AUTO: 0 K/UL (ref 0–0.04)
IMM GRANULOCYTES NFR BLD AUTO: 0 % (ref 0–0.5)
IMM GRANULOCYTES NFR BLD AUTO: 0 % (ref 0–0.5)
IRON SERPL-MCNC: 33 UG/DL (ref 30–160)
LDLC SERPL CALC-MCNC: 117 MG/DL (ref 63–159)
LYMPHOCYTES # BLD AUTO: 1.6 K/UL (ref 1–4.8)
LYMPHOCYTES # BLD AUTO: 1.6 K/UL (ref 1–4.8)
LYMPHOCYTES NFR BLD: 40.7 % (ref 18–48)
LYMPHOCYTES NFR BLD: 40.7 % (ref 18–48)
MCH RBC QN AUTO: 28.5 PG (ref 27–31)
MCH RBC QN AUTO: 28.5 PG (ref 27–31)
MCHC RBC AUTO-ENTMCNC: 34.3 G/DL (ref 32–36)
MCHC RBC AUTO-ENTMCNC: 34.3 G/DL (ref 32–36)
MCV RBC AUTO: 83 FL (ref 82–98)
MCV RBC AUTO: 83 FL (ref 82–98)
MONOCYTES # BLD AUTO: 0.6 K/UL (ref 0.3–1)
MONOCYTES # BLD AUTO: 0.6 K/UL (ref 0.3–1)
MONOCYTES NFR BLD: 14.2 % (ref 4–15)
MONOCYTES NFR BLD: 14.2 % (ref 4–15)
NEUTROPHILS # BLD AUTO: 1.7 K/UL (ref 1.8–7.7)
NEUTROPHILS # BLD AUTO: 1.7 K/UL (ref 1.8–7.7)
NEUTROPHILS NFR BLD: 42.7 % (ref 38–73)
NEUTROPHILS NFR BLD: 42.7 % (ref 38–73)
NONHDLC SERPL-MCNC: 133 MG/DL
NRBC BLD-RTO: 0 /100 WBC
NRBC BLD-RTO: 0 /100 WBC
PLATELET # BLD AUTO: 208 K/UL (ref 150–450)
PLATELET # BLD AUTO: 208 K/UL (ref 150–450)
PMV BLD AUTO: 10.5 FL (ref 9.2–12.9)
PMV BLD AUTO: 10.5 FL (ref 9.2–12.9)
POTASSIUM SERPL-SCNC: 4.2 MMOL/L (ref 3.5–5.1)
POTASSIUM SERPL-SCNC: 4.2 MMOL/L (ref 3.5–5.1)
PROT SERPL-MCNC: 6.8 G/DL (ref 6–8.4)
PROT SERPL-MCNC: 6.8 G/DL (ref 6–8.4)
RBC # BLD AUTO: 4.31 M/UL (ref 4–5.4)
RBC # BLD AUTO: 4.31 M/UL (ref 4–5.4)
SATURATED IRON: 7 % (ref 20–50)
SODIUM SERPL-SCNC: 136 MMOL/L (ref 136–145)
SODIUM SERPL-SCNC: 136 MMOL/L (ref 136–145)
TOTAL IRON BINDING CAPACITY: 480 UG/DL (ref 250–450)
TRANSFERRIN SERPL-MCNC: 324 MG/DL (ref 200–375)
TRIGL SERPL-MCNC: 80 MG/DL (ref 30–150)
TSH SERPL DL<=0.005 MIU/L-ACNC: 1.98 UIU/ML (ref 0.4–4)
WBC # BLD AUTO: 3.86 K/UL (ref 3.9–12.7)
WBC # BLD AUTO: 3.86 K/UL (ref 3.9–12.7)

## 2024-02-28 PROCEDURE — 85025 COMPLETE CBC W/AUTO DIFF WBC: CPT | Mod: PN | Performed by: INTERNAL MEDICINE

## 2024-02-28 PROCEDURE — 84443 ASSAY THYROID STIM HORMONE: CPT | Performed by: FAMILY MEDICINE

## 2024-02-28 PROCEDURE — 80053 COMPREHEN METABOLIC PANEL: CPT | Mod: PN | Performed by: INTERNAL MEDICINE

## 2024-02-28 PROCEDURE — 82728 ASSAY OF FERRITIN: CPT | Performed by: INTERNAL MEDICINE

## 2024-02-28 PROCEDURE — 83540 ASSAY OF IRON: CPT | Performed by: INTERNAL MEDICINE

## 2024-02-28 PROCEDURE — 84238 ASSAY NONENDOCRINE RECEPTOR: CPT | Performed by: INTERNAL MEDICINE

## 2024-02-28 PROCEDURE — 80061 LIPID PANEL: CPT | Performed by: FAMILY MEDICINE

## 2024-03-02 LAB — STFR SERPL-MCNC: 2.7 MG/L (ref 1.8–4.6)

## 2024-03-06 ENCOUNTER — OFFICE VISIT (OUTPATIENT)
Dept: HEMATOLOGY/ONCOLOGY | Facility: CLINIC | Age: 69
End: 2024-03-06
Payer: MEDICARE

## 2024-03-06 VITALS
SYSTOLIC BLOOD PRESSURE: 140 MMHG | TEMPERATURE: 97 F | HEART RATE: 60 BPM | OXYGEN SATURATION: 100 % | WEIGHT: 152.31 LBS | BODY MASS INDEX: 26 KG/M2 | HEIGHT: 64 IN | DIASTOLIC BLOOD PRESSURE: 83 MMHG

## 2024-03-06 DIAGNOSIS — I10 ESSENTIAL HYPERTENSION: Chronic | ICD-10-CM

## 2024-03-06 DIAGNOSIS — N83.8 MASS, OVARIAN: ICD-10-CM

## 2024-03-06 DIAGNOSIS — D50.9 IRON DEFICIENCY ANEMIA, UNSPECIFIED IRON DEFICIENCY ANEMIA TYPE: Primary | ICD-10-CM

## 2024-03-06 PROCEDURE — 99999 PR PBB SHADOW E&M-EST. PATIENT-LVL III: CPT | Mod: PBBFAC,,, | Performed by: INTERNAL MEDICINE

## 2024-03-06 PROCEDURE — 3077F SYST BP >= 140 MM HG: CPT | Mod: CPTII,S$GLB,, | Performed by: INTERNAL MEDICINE

## 2024-03-06 PROCEDURE — 1101F PT FALLS ASSESS-DOCD LE1/YR: CPT | Mod: CPTII,S$GLB,, | Performed by: INTERNAL MEDICINE

## 2024-03-06 PROCEDURE — 99214 OFFICE O/P EST MOD 30 MIN: CPT | Mod: S$GLB,,, | Performed by: INTERNAL MEDICINE

## 2024-03-06 PROCEDURE — 4010F ACE/ARB THERAPY RXD/TAKEN: CPT | Mod: CPTII,S$GLB,, | Performed by: INTERNAL MEDICINE

## 2024-03-06 PROCEDURE — 1159F MED LIST DOCD IN RCRD: CPT | Mod: CPTII,S$GLB,, | Performed by: INTERNAL MEDICINE

## 2024-03-06 PROCEDURE — 3008F BODY MASS INDEX DOCD: CPT | Mod: CPTII,S$GLB,, | Performed by: INTERNAL MEDICINE

## 2024-03-06 PROCEDURE — 1126F AMNT PAIN NOTED NONE PRSNT: CPT | Mod: CPTII,S$GLB,, | Performed by: INTERNAL MEDICINE

## 2024-03-06 PROCEDURE — 3288F FALL RISK ASSESSMENT DOCD: CPT | Mod: CPTII,S$GLB,, | Performed by: INTERNAL MEDICINE

## 2024-03-06 PROCEDURE — 3079F DIAST BP 80-89 MM HG: CPT | Mod: CPTII,S$GLB,, | Performed by: INTERNAL MEDICINE

## 2024-03-06 PROCEDURE — 1157F ADVNC CARE PLAN IN RCRD: CPT | Mod: CPTII,S$GLB,, | Performed by: INTERNAL MEDICINE

## 2024-03-06 NOTE — PROGRESS NOTES
"  Name: Bhavani Hill  MRN:  [id number]  :  1955 Age 68 y.o.  Date of Service: 3/6/2024    Reason for visit:  Bhavani Hill is a 68 y.o. female here regarding DEREJE.    Hematology History/History of Present Illness:   Patient of Dr. Forde, per his note:     "Mrs Hill is a 68-year-old female referred for iron deficiency anemia.  The most recent labs are from 2023 and they are as follows:  Ferritin was 14 ng/mL  WBCs were 4,800 per cubic mm, hemoglobin 11.6 grams/deciliter, hematocrit 36.1%, MCV 79, and platelets 223 K.  A CBC on 2023 showed a white count of 5300 per cubic mm, hemoglobin 10.6 grams/deciliter, hematocrit 34.2%, MCV 82 and platelets 240 K.  On the same day the ferritin was 9 ng/mL, while 3 stool samples that she submitted were negative for occult blood  A CBC on 2023 had shown a white count of 5400 per cubic mm, hemoglobin 11.6 grams/deciliter, hematocrit 36.1%, MCV 89 and platelets 197 K.  A CBC on May 19, 2022 had shown a white count of 4500 per cubic mm, hemoglobin 12.7 grams/deciliter, hematocrit 39.9%, MCV 85 and platelets 234 K.  She is referred for evaluation.  Other pertinent labs include a ferritin of 10 ng/mL.  On April 3, 2023  Three stool samples she has submitted in mid  were negative for occult blood.     She states that she was prescribed an oral formulation of iron in early  which she did not tolerate well, and she was switched to a different formulation that she can tolerate better."    Interval Hx:   Tried oral iron which caused GI side effects, constipation + hemorrhoids.   Had bloating with fiber    PHYSICAL EXAMINATION:  BP (!) 140/83 (BP Location: Right arm, Patient Position: Sitting, BP Method: Medium (Automatic))   Pulse 60   Temp 96.7 °F (35.9 °C) (Temporal)   Ht 5' 4" (1.626 m)   Wt 69.1 kg (152 lb 5.4 oz)   SpO2 100%   BMI 26.15 kg/m²   Wt Readings from Last 3 Encounters:   24 69.1 kg (152 lb 5.4 oz)   23 68.5 kg (151 lb 0.2 oz) "   10/26/23 72.6 kg (160 lb)     ECOG PERFORMANCE STATUS: 1  Physical Exam   General:  Well-appearing, nontoxic  Eyes:  Equal and round pupils, EOMI, no scleral icterus  Mouth:  No lesions, moist  Cardiovascular:  Warm, well-perfused, no peripheral edema  Lungs:  Unlabored on room air, no wheezing  Neurologic:  Awake, alert and oriented, participating in the exam  Psych:  Appropriate mood and affect  Skin:  Normal pallor, No rashes  Heme:  No petechiae, no purpura      LABORATORY:  CBC  Lab Results   Component Value Date    WBC 3.86 (L) 02/28/2024    WBC 3.86 (L) 02/28/2024    HGB 12.3 02/28/2024    HGB 12.3 02/28/2024    HCT 35.9 (L) 02/28/2024    HCT 35.9 (L) 02/28/2024    MCV 83 02/28/2024    MCV 83 02/28/2024     02/28/2024     02/28/2024         BMP  Lab Results   Component Value Date     02/28/2024     02/28/2024    K 4.2 02/28/2024    K 4.2 02/28/2024     02/28/2024     02/28/2024    CO2 26 02/28/2024    CO2 26 02/28/2024    BUN 11 02/28/2024    BUN 11 02/28/2024    CREATININE 0.8 02/28/2024    CREATININE 0.8 02/28/2024    CALCIUM 9.9 02/28/2024    CALCIUM 9.9 02/28/2024    ANIONGAP 10 02/28/2024    ANIONGAP 10 02/28/2024    ESTGFRAFRICA >60.0 05/19/2022    EGFRNONAA >60.0 05/19/2022         PATHOLOGY:      RADIOLOGY:      ASSESSMENT AND PLAN:  Bhavani Hill is a 68 y.o. female with...    # iron-deficiency anemia  -was supposed to have EGD and colonoscopy coming up but was discovered to have a large ovarian mass that neeneededds to have this surgically removed which is now complete but she reports he needed to heal prior to getting her scopes done, she plans on getting EGD and colonoscopy soon  -her cytopenias have resolved with oral iron, she reports she will not take oral iron in the future could because she had abundant GI side effects  -iron levels hanging on but slowly trending down, no IV iron required now.     # ovarian mass  -being monitored by gyn Oncology,  elevated CA 19 9 however no pancreatic lesions appreciated  -reported as benign     # hypertension controlled, per primary care      Tanya Perez M.D.  Hematology/Oncology     Route Chart for Scheduling  Med Onc Route Chart for Scheduling         Answers submitted by the patient for this visit:  Review of Systems Questionnaire (Submitted on 2/28/2024)  appetite change : No  unexpected weight change: No  mouth sores: No  visual disturbance: No  cough: No  shortness of breath: No  chest pain: No  abdominal pain: No  diarrhea: No  frequency: No  back pain: No  rash: No  headaches: No  adenopathy: No  nervous/ anxious: No

## 2024-03-06 NOTE — PROGRESS NOTES
Route Chart for Scheduling    Med Onc Chart Routing      Follow up with physician    Follow up with [facility] 6 months. cbc and iron studies 2-3 days prior to appt with Re[facility]   Infusion scheduling note    Injection scheduling note    Labs   Scheduling:  Preferred lab:  Lab interval:  Cbc and iron studies in 6 months   Imaging    Pharmacy appointment    Other referrals

## 2024-03-08 ENCOUNTER — OFFICE VISIT (OUTPATIENT)
Dept: PRIMARY CARE CLINIC | Facility: CLINIC | Age: 69
End: 2024-03-08
Payer: MEDICARE

## 2024-03-08 VITALS
HEART RATE: 70 BPM | OXYGEN SATURATION: 99 % | BODY MASS INDEX: 26.1 KG/M2 | WEIGHT: 152.88 LBS | DIASTOLIC BLOOD PRESSURE: 78 MMHG | SYSTOLIC BLOOD PRESSURE: 136 MMHG | HEIGHT: 64 IN | RESPIRATION RATE: 17 BRPM

## 2024-03-08 DIAGNOSIS — E03.9 HYPOTHYROIDISM, UNSPECIFIED TYPE: Chronic | ICD-10-CM

## 2024-03-08 DIAGNOSIS — I10 ESSENTIAL HYPERTENSION: Primary | Chronic | ICD-10-CM

## 2024-03-08 DIAGNOSIS — Z12.11 COLON CANCER SCREENING: ICD-10-CM

## 2024-03-08 DIAGNOSIS — I70.0 AORTIC ATHEROSCLEROSIS: ICD-10-CM

## 2024-03-08 PROCEDURE — 3075F SYST BP GE 130 - 139MM HG: CPT | Mod: CPTII,S$GLB,, | Performed by: FAMILY MEDICINE

## 2024-03-08 PROCEDURE — 99214 OFFICE O/P EST MOD 30 MIN: CPT | Mod: S$GLB,,, | Performed by: FAMILY MEDICINE

## 2024-03-08 PROCEDURE — 3078F DIAST BP <80 MM HG: CPT | Mod: CPTII,S$GLB,, | Performed by: FAMILY MEDICINE

## 2024-03-08 PROCEDURE — 1157F ADVNC CARE PLAN IN RCRD: CPT | Mod: CPTII,S$GLB,, | Performed by: FAMILY MEDICINE

## 2024-03-08 PROCEDURE — 3288F FALL RISK ASSESSMENT DOCD: CPT | Mod: CPTII,S$GLB,, | Performed by: FAMILY MEDICINE

## 2024-03-08 PROCEDURE — 3008F BODY MASS INDEX DOCD: CPT | Mod: CPTII,S$GLB,, | Performed by: FAMILY MEDICINE

## 2024-03-08 PROCEDURE — 99999 PR PBB SHADOW E&M-EST. PATIENT-LVL IV: CPT | Mod: PBBFAC,,, | Performed by: FAMILY MEDICINE

## 2024-03-08 PROCEDURE — 1160F RVW MEDS BY RX/DR IN RCRD: CPT | Mod: CPTII,S$GLB,, | Performed by: FAMILY MEDICINE

## 2024-03-08 PROCEDURE — 1126F AMNT PAIN NOTED NONE PRSNT: CPT | Mod: CPTII,S$GLB,, | Performed by: FAMILY MEDICINE

## 2024-03-08 PROCEDURE — 1159F MED LIST DOCD IN RCRD: CPT | Mod: CPTII,S$GLB,, | Performed by: FAMILY MEDICINE

## 2024-03-08 PROCEDURE — 1101F PT FALLS ASSESS-DOCD LE1/YR: CPT | Mod: CPTII,S$GLB,, | Performed by: FAMILY MEDICINE

## 2024-03-08 PROCEDURE — 4010F ACE/ARB THERAPY RXD/TAKEN: CPT | Mod: CPTII,S$GLB,, | Performed by: FAMILY MEDICINE

## 2024-03-08 RX ORDER — ROSUVASTATIN CALCIUM 10 MG/1
10 TABLET, COATED ORAL DAILY
Qty: 90 TABLET | Refills: 3 | Status: SHIPPED | OUTPATIENT
Start: 2024-03-08 | End: 2025-03-08

## 2024-03-08 NOTE — ASSESSMENT & PLAN NOTE
She has mildly elevated cholesterol but we did review her CT scan from several months ago that does show some aortic calcifications.  We discussed that this does indicate ASCVD.  And that there is risk for progression.  Recommend optimizing risk factor control, continue adequate hypertension control.  Continue to work on healthy nutrition and exercise.  Discussed risk and benefit of statin medication and she agrees.  Will begin rosuvastatin 10 mg daily.

## 2024-03-08 NOTE — ASSESSMENT & PLAN NOTE
She did cancel her colonoscopy secondary to evaluation of her ovarian mass and pelvic surgery.  But she is ready to reschedule and will call to do so.  She specifically request Dr. Quintero so will contact the GI department to see when they can schedule

## 2024-03-08 NOTE — PROGRESS NOTES
Primary Care Provider Appointment   [facility]       Patient ID: Bhavani Hill is a 68 y.o. female.    ASSESSMENT/PLAN by Problem List:    1. Essential hypertension  Overview:  since ~ 2000    Assessment & Plan:  Appears stable and satisfactory.  Continue current medications.      2. Aortic atherosclerosis  Assessment & Plan:  She has mildly elevated cholesterol but we did review her CT scan from several months ago that does show some aortic calcifications.  We discussed that this does indicate ASCVD.  And that there is risk for progression.  Recommend optimizing risk factor control, continue adequate hypertension control.  Continue to work on healthy nutrition and exercise.  Discussed risk and benefit of statin medication and she agrees.  Will begin rosuvastatin 10 mg daily.    Orders:  -     rosuvastatin (CRESTOR) 10 MG tablet; Take 1 tablet (10 mg total) by mouth once daily.  Dispense: 90 tablet; Refill: 3  -     Comprehensive Metabolic Panel; Future; Expected date: 03/08/2024  -     Lipid Panel; Future; Expected date: 03/08/2024    3. Hypothyroidism, unspecified type  Overview:  since 2008    Assessment & Plan:  Stable continue levothyroxine.      4. Colon cancer screening  Assessment & Plan:  She did cancel her colonoscopy secondary to evaluation of her ovarian mass and pelvic surgery.  But she is ready to reschedule and will call to do so.  She specifically request Dr. Quintero so will contact the GI department to see when they can schedule           Follow Up:  Three months with labs      Subjective:     Chief Complaint   Patient presents with    Follow-up     I have reviewed the information entered by the ancillary staff regarding the chief complaint as well as the related history.    HPI    Patient is a/an 68 y.o.  female     Routine follow-up.  She has been doing fairly well.  Recovering from her surgery.  Activity level back near normal.  See above for all details  addressed today    For complete problem list, past medical history, surgical history, social history, etc., see appropriate section in the electronic medical record    Review of Systems   Constitutional:  Negative for activity change and unexpected weight change.   HENT:  Negative for hearing loss, rhinorrhea and trouble swallowing.    Eyes:  Negative for discharge and visual disturbance.   Respiratory:  Negative for chest tightness and wheezing.    Cardiovascular:  Negative for chest pain and palpitations.   Gastrointestinal:  Negative for blood in stool, constipation, diarrhea and vomiting.   Endocrine: Negative for polydipsia and polyuria.   Genitourinary:  Negative for difficulty urinating, dysuria, hematuria and menstrual problem.   Musculoskeletal:  Negative for arthralgias, joint swelling and neck pain.   Neurological:  Negative for weakness and headaches.   Psychiatric/Behavioral:  Negative for confusion and dysphoric mood.        Objective     Physical Exam  Vitals reviewed.   Constitutional:       General: She is not in acute distress.     Appearance: She is well-developed. She is not ill-appearing.   HENT:      Head: Normocephalic and atraumatic.   Eyes:      General: No scleral icterus.     Conjunctiva/sclera: Conjunctivae normal.   Cardiovascular:      Rate and Rhythm: Normal rate and regular rhythm.      Heart sounds: Normal heart sounds. No murmur heard.  Pulmonary:      Effort: Pulmonary effort is normal. No respiratory distress.      Breath sounds: Normal breath sounds. No wheezing or rales.   Skin:     General: Skin is dry.      Findings: No rash.   Neurological:      Mental Status: She is alert and oriented to person, place, and time.   Psychiatric:         Behavior: Behavior normal.       Vitals:    03/08/24 1059 03/08/24 1136   BP: (!) 146/80 136/78   BP Location: Right arm    Patient Position: Sitting    BP Method: Medium (Manual)    Pulse: 70    Resp: 17    SpO2: 99%    Weight: 69.3 kg (152 lb  "14.2 oz)    Height: 5' 4" (1.626 m)            THIS DOCUMENT WAS MADE IN PART WITH VOICE RECOGNITION SOFTWARE.  OCCASIONALLY THIS SOFTWARE WILL MISINTERPRET WORDS OR PHRASES.    "

## 2024-03-14 DIAGNOSIS — Z12.31 ENCOUNTER FOR SCREENING MAMMOGRAM FOR BREAST CANCER: Primary | ICD-10-CM

## 2024-03-24 DIAGNOSIS — F40.248 OTHER SITUATIONAL TYPE PHOBIA: ICD-10-CM

## 2024-03-25 RX ORDER — ALPRAZOLAM 0.5 MG/1
TABLET ORAL
Qty: 15 TABLET | Refills: 0 | Status: SHIPPED | OUTPATIENT
Start: 2024-03-25

## 2024-04-03 ENCOUNTER — HOSPITAL ENCOUNTER (OUTPATIENT)
Dept: RADIOLOGY | Facility: HOSPITAL | Age: 69
Discharge: HOME | End: 2024-04-03
Attending: OBSTETRICS & GYNECOLOGY
Payer: MEDICARE

## 2024-04-03 DIAGNOSIS — Z12.31 ENCOUNTER FOR SCREENING MAMMOGRAM FOR BREAST CANCER: ICD-10-CM

## 2024-04-03 PROCEDURE — 77067 SCR MAMMO BI INCL CAD: CPT | Mod: 26,,, | Performed by: RADIOLOGY

## 2024-04-03 PROCEDURE — 77063 BREAST TOMOSYNTHESIS BI: CPT | Mod: 26,,, | Performed by: RADIOLOGY

## 2024-04-03 PROCEDURE — 77067 SCR MAMMO BI INCL CAD: CPT | Mod: TC,PO

## 2024-04-10 DIAGNOSIS — E03.9 ACQUIRED HYPOTHYROIDISM: ICD-10-CM

## 2024-04-10 DIAGNOSIS — I10 ESSENTIAL HYPERTENSION: ICD-10-CM

## 2024-04-11 RX ORDER — METOPROLOL SUCCINATE 25 MG/1
25 TABLET, EXTENDED RELEASE ORAL
Qty: 90 TABLET | Refills: 3 | Status: SHIPPED | OUTPATIENT
Start: 2024-04-11

## 2024-04-11 RX ORDER — LISINOPRIL AND HYDROCHLOROTHIAZIDE 12.5; 2 MG/1; MG/1
2 TABLET ORAL DAILY
Qty: 180 TABLET | Refills: 3 | Status: SHIPPED | OUTPATIENT
Start: 2024-04-11

## 2024-04-11 RX ORDER — LEVOTHYROXINE SODIUM 50 UG/1
50 TABLET ORAL
Qty: 90 TABLET | Refills: 3 | Status: SHIPPED | OUTPATIENT
Start: 2024-04-11

## 2024-04-11 NOTE — TELEPHONE ENCOUNTER
Refill Decision Note   Bhavani Hill  is requesting a refill authorization.  Brief Assessment and Rationale for Refill:  Approve     Medication Therapy Plan:         Comments:     Note composed:1:55 PM 04/11/2024

## 2024-04-11 NOTE — TELEPHONE ENCOUNTER
No care due was identified.  [facility] Embedded Care Due Messages. Reference number: [id number].   4/11/2024 1:55:22 PM CDT

## 2024-06-03 ENCOUNTER — LAB VISIT (OUTPATIENT)
Dept: LAB | Facility: HOSPITAL | Age: 69
End: 2024-06-03
Attending: FAMILY MEDICINE
Payer: MEDICARE

## 2024-06-03 DIAGNOSIS — I70.0 AORTIC ATHEROSCLEROSIS: ICD-10-CM

## 2024-06-03 LAB
ALBUMIN SERPL BCP-MCNC: 3.7 G/DL (ref 3.5–5.2)
ALP SERPL-CCNC: 71 U/L (ref 55–135)
ALT SERPL W/O P-5'-P-CCNC: 14 U/L (ref 10–44)
ANION GAP SERPL CALC-SCNC: 8 MMOL/L (ref 8–16)
AST SERPL-CCNC: 25 U/L (ref 10–40)
BILIRUB SERPL-MCNC: 0.9 MG/DL (ref 0.1–1)
BUN SERPL-MCNC: 13 MG/DL (ref 8–23)
CALCIUM SERPL-MCNC: 9.2 MG/DL (ref 8.7–10.5)
CHLORIDE SERPL-SCNC: 103 MMOL/L (ref 95–110)
CHOLEST SERPL-MCNC: 165 MG/DL (ref 120–199)
CHOLEST/HDLC SERPL: 1.9 {RATIO} (ref 2–5)
CO2 SERPL-SCNC: 27 MMOL/L (ref 23–29)
CREAT SERPL-MCNC: 0.8 MG/DL (ref 0.5–1.4)
EST. GFR  (NO RACE VARIABLE): >60 ML/MIN/1.73 M^2
GLUCOSE SERPL-MCNC: 98 MG/DL (ref 70–110)
HDLC SERPL-MCNC: 86 MG/DL (ref 40–75)
HDLC SERPL: 52.1 % (ref 20–50)
LDLC SERPL CALC-MCNC: 69.2 MG/DL (ref 63–159)
NONHDLC SERPL-MCNC: 79 MG/DL
POTASSIUM SERPL-SCNC: 4 MMOL/L (ref 3.5–5.1)
PROT SERPL-MCNC: 6.4 G/DL (ref 6–8.4)
SODIUM SERPL-SCNC: 138 MMOL/L (ref 136–145)
TRIGL SERPL-MCNC: 49 MG/DL (ref 30–150)

## 2024-06-03 PROCEDURE — 80061 LIPID PANEL: CPT | Performed by: FAMILY MEDICINE

## 2024-06-03 PROCEDURE — 36415 COLL VENOUS BLD VENIPUNCTURE: CPT | Mod: PO | Performed by: FAMILY MEDICINE

## 2024-06-03 PROCEDURE — 80053 COMPREHEN METABOLIC PANEL: CPT | Performed by: FAMILY MEDICINE

## 2024-06-12 ENCOUNTER — OFFICE VISIT (OUTPATIENT)
Dept: PRIMARY CARE CLINIC | Facility: CLINIC | Age: 69
End: 2024-06-12
Payer: MEDICARE

## 2024-06-12 VITALS
SYSTOLIC BLOOD PRESSURE: 138 MMHG | BODY MASS INDEX: 27.04 KG/M2 | TEMPERATURE: 98 F | HEART RATE: 68 BPM | DIASTOLIC BLOOD PRESSURE: 82 MMHG | RESPIRATION RATE: 18 BRPM | WEIGHT: 157.5 LBS | OXYGEN SATURATION: 98 %

## 2024-06-12 DIAGNOSIS — I70.0 AORTIC ATHEROSCLEROSIS: Chronic | ICD-10-CM

## 2024-06-12 DIAGNOSIS — E78.00 ELEVATED CHOLESTEROL: ICD-10-CM

## 2024-06-12 DIAGNOSIS — R20.2 TINGLING IN EXTREMITIES: ICD-10-CM

## 2024-06-12 DIAGNOSIS — E03.9 HYPOTHYROIDISM, UNSPECIFIED TYPE: ICD-10-CM

## 2024-06-12 DIAGNOSIS — I10 ESSENTIAL HYPERTENSION: Primary | Chronic | ICD-10-CM

## 2024-06-12 PROCEDURE — 4010F ACE/ARB THERAPY RXD/TAKEN: CPT | Mod: CPTII,S$GLB,, | Performed by: FAMILY MEDICINE

## 2024-06-12 PROCEDURE — 1101F PT FALLS ASSESS-DOCD LE1/YR: CPT | Mod: CPTII,S$GLB,, | Performed by: FAMILY MEDICINE

## 2024-06-12 PROCEDURE — 1159F MED LIST DOCD IN RCRD: CPT | Mod: CPTII,S$GLB,, | Performed by: FAMILY MEDICINE

## 2024-06-12 PROCEDURE — 1160F RVW MEDS BY RX/DR IN RCRD: CPT | Mod: CPTII,S$GLB,, | Performed by: FAMILY MEDICINE

## 2024-06-12 PROCEDURE — 99999 PR PBB SHADOW E&M-EST. PATIENT-LVL V: CPT | Mod: PBBFAC,,, | Performed by: FAMILY MEDICINE

## 2024-06-12 PROCEDURE — 1157F ADVNC CARE PLAN IN RCRD: CPT | Mod: CPTII,S$GLB,, | Performed by: FAMILY MEDICINE

## 2024-06-12 PROCEDURE — 99214 OFFICE O/P EST MOD 30 MIN: CPT | Mod: S$GLB,,, | Performed by: FAMILY MEDICINE

## 2024-06-12 PROCEDURE — 1126F AMNT PAIN NOTED NONE PRSNT: CPT | Mod: CPTII,S$GLB,, | Performed by: FAMILY MEDICINE

## 2024-06-12 PROCEDURE — 3079F DIAST BP 80-89 MM HG: CPT | Mod: CPTII,S$GLB,, | Performed by: FAMILY MEDICINE

## 2024-06-12 PROCEDURE — 3288F FALL RISK ASSESSMENT DOCD: CPT | Mod: CPTII,S$GLB,, | Performed by: FAMILY MEDICINE

## 2024-06-12 PROCEDURE — 3075F SYST BP GE 130 - 139MM HG: CPT | Mod: CPTII,S$GLB,, | Performed by: FAMILY MEDICINE

## 2024-06-12 PROCEDURE — 3008F BODY MASS INDEX DOCD: CPT | Mod: CPTII,S$GLB,, | Performed by: FAMILY MEDICINE

## 2024-06-12 NOTE — ASSESSMENT & PLAN NOTE
Blood pressure remains satisfactory.  She will continue current medications.  Recommend low-sodium diet and regular exercise.   Nephrology ([facility]) Progress Note      Patient:  Erick Luong  YOB: 1957  Date of Service: 10/10/2023  MRN: [id number]   Acct: [id number]   Primary Care Physician: Del Shetty MD  Advance Directive:   There are no questions and answers to display.     Admit Date: 9/18/2023       Hospital Day: 0  Referring Provider: Juan Manuel Page MD      Patient personally seen and examined.  Complete chart including Consults, Notes, Operative Reports, Labs, Cardiology, and Radiology studies reviewed as able.    Chief complaint: Abnormal labs.    Subjective:  Ercik Luong is a 66 y.o. male  whom we were consulted for chronic kidney disease stage IIIb.  He has stage IIIb chronic kidney disease baseline, follows me in the office as he has diabetic nephropathy.  He has history of insulin-dependent type 2 diabetes, hypertension, abdominal obesity, obstructive sleep apnea, diabetic foot ulcer and coronary artery disease.  He was accepted as a transfer from [facility].  [place] [facility] [place] and extensive debridement of left foot wound at plantar aspect.  Postoperative wound measuring 6 x 17 x 4 cm with exposed bone.  Surgical culture returned positive for Proteus Mirabills.  Patient needed long-term IV antibiotics.  He is now admitted for wound care, long-term IV antibiotics, chronic kidney disease and treatment of diastolic congestive heart failure.  Patient also has history of gastroparesis.       This afternoon patient is nauseated.  His renal function is continued to worsen.  He denies any swelling of legs or shortness of breath.      Allergies:  Cefepime, Bactrim [sulfamethoxazole-trimethoprim], Vancomycin, Zolpidem, Zolpidem tartrate, and Metronidazole    Home Meds:  Medications Prior to Admission   Medication Sig Dispense Refill Last Dose    amitriptyline (ELAVIL) 25 MG tablet Take 2 tablets by mouth Daily at bedtime. 60 tablet 1      amoxicillin-clavulanate (Augmentin) 500-125 MG per tablet Take 1 tablet by mouth every 12 hours with meals for 7 days. 14 tablet 0     ascorbic acid (VITAMIN C) 1000 MG tablet Take 1 tablet by mouth Daily. 30 tablet 3     Aspirin 81 MG capsule Take 81 mg by mouth Daily.       bumetanide (BUMEX) 2 MG tablet Take 1 tablet by mouth Daily. Take 2 tablets in morning;1 tablet at night       busPIRone (BUSPAR) 10 MG tablet Take 1 tablet by mouth 2 (Two) Times a Day As Needed. 100 tablet 3     calcitriol (ROCALTROL) 0.5 MCG capsule Take 1 capsule by mouth Daily. 90 capsule 4     carvedilol (COREG) 6.25 MG tablet Take 1 tablet by mouth 2 (Two) Times a Day. 180 tablet 4     Cholecalciferol (D-5000) 125 MCG (5000 UT) tablet Take 1 tablet by mouth Daily Before Lunch. 30 tablet 2     cloNIDine (CATAPRES) 0.1 MG tablet Take 1 tablet by mouth Every 12 (Twelve) Hours. 60 tablet 2     Diclofenac Sodium (VOLTAREN) 1 % gel gel Apply 2 g topically to the appropriate area as directed 4 (Four) Times a Day As Needed. 300 g 11     donepezil (ARICEPT) 10 MG tablet Take 1 tablet by mouth Daily. 90 tablet 4     Dulaglutide (Trulicity) 4.5 MG/0.5ML solution pen-injector Inject 0.5 mL under the skin into the appropriate area as directed 1 (One) Time Per Week. 2 mL 11     empagliflozin (JARDIANCE) 25 MG tablet tablet Take 1 tablet by mouth Daily. 30 tablet 2     HYDROcodone-acetaminophen (NORCO) 7.5-325 MG per tablet Take 1 tablet by mouth 2 (Two) Times a Day As Needed. 40 tablet 0     Insulin Regular Human, Conc, (HumuLIN R U-500 KwikPen) 500 UNIT/ML solution pen-injector CONCENTRATED injection Inject 120 Units under the skin into the appropriate area as directed 2 (Two) Times a Day with breakfast and dinner. (Patient taking differently: Inject 120 Units under the skin into the appropriate area as directed 3 (Three) Times a Day Before Meals.) 18 mL 5     midodrine (PROAMATINE) 10 MG tablet Take 1 tablet by mouth 3 (Three) Times a Day. 270  tablet 4     ondansetron ODT (ZOFRAN-ODT) 8 MG disintegrating tablet Place 1 tablet under tongue 3 times a day as needed. 25 tablet 1     pantoprazole (Protonix) 40 MG EC tablet Take 1 tablet by mouth 2 (Two) Times a Day. 180 tablet 4     polyethylene glycol (MIRALAX) 17 g packet Take 17 g by mouth Daily. Obtain OTC       pregabalin (LYRICA) 100 MG capsule Take 1 capsule by mouth Daily With Breakfast AND 2 capsules Every Night. 90 capsule 2     rosuvastatin (CRESTOR) 40 MG tablet Take 1 tablet by mouth Every Night. 90 tablet 3     sennosides-docusate (PERICOLACE) 8.6-50 MG per tablet Take 1 tablet by mouth Every Night. Obtain OTC       sennosides-docusate (PERICOLACE) 8.6-50 MG per tablet Take 1 tablet by mouth every night at bedtime. 30 tablet 2     sodium hypochlorite (DAKIN'S 1/4 STRENGTH) 0.125 % solution topical solution 0.125% Apply to affected area twice daily 473 mL 3     tamsulosin (FLOMAX) 0.4 MG capsule 24 hr capsule Take 1 capsule by mouth Daily. 90 capsule 1        Medicines:  Current Facility-Administered Medications   Medication Dose Route Frequency Provider Last Rate Last Admin    acetaminophen (TYLENOL) tablet 650 mg  650 mg Oral Q4H PRN Juan Manuel Page MD        Or    acetaminophen (TYLENOL) suppository 650 mg  650 mg Rectal Q4H PRN Juan Manuel Page MD        aluminum-magnesium hydroxide-simethicone (MAALOX MAX) 400-400-40 MG/5ML suspension 30 mL  30 mL Oral Q4H PRN Juan Manuel Page MD        apixaban (ELIQUIS) tablet 5 mg  5 mg Oral BID With Meals Juan Manuel Page MD        bumetanide (BUMEX) tablet 1 mg  1 mg Oral BID Brian Lomas MD        busPIRone (BUSPAR) tablet 10 mg  10 mg Oral Q12H PRN Juan Manuel Page MD        calcitriol (ROCALTROL) capsule 0.5 mcg  0.5 mcg Oral Daily Juan Manuel Page MD        carvedilol (COREG) tablet 3.125 mg  3.125 mg Oral BID With Meals Juan Manuel Page MD        cefTRIAXone (ROCEPHIN) 1,000 mg in sodium chloride 0.9 % 100  mL IVPB  1,000 mg Intravenous Q24H Juan Manuel Page MD        cholecalciferol (VITAMIN D3) tablet 5,000 Units  5,000 Units Oral Daily Juan Manuel Page MD        dextrose (D50W) (25 g/50 mL) IV injection 25 g  25 g Intravenous Q15 Min PRN Juan Manuel Page MD        dextrose (GLUTOSE) oral gel 15 g  15 g Oral Q15 Min PRN Juan Manuel Page MD        docusate sodium (COLACE) capsule 200 mg  200 mg Oral BID Juan Manuel Page MD        Dulaglutide solution pen-injector 4.5 mg ** PATIENT-SUPPLIED MEDICATION **  4.5 mg Subcutaneous Weekly Juan Manuel Page MD        glucagon (GLUCAGEN) injection 1 mg  1 mg Intramuscular Q15 Min PRN Juan Manuel Page MD        HYDROmorphone (DILAUDID) injection 1 mg  1 mg Intravenous Q8H PRN Juan Manuel Page MD        insulin detemir (LEVEMIR) injection 30 Units  30 Units Subcutaneous Daily Juan Manuel Page MD        Insulin Lispro (humaLOG) injection 2-9 Units  2-9 Units Subcutaneous 4x Daily AC & at Bedtime Juan Manuel Page MD        isosorbide mononitrate (IMDUR) 24 hr tablet 30 mg  30 mg Oral Q24H Joanie Mendes APRN        Lidocaine HCl 4 % gel gel 1 application   1 application  Topical PRN Juan Manuel Page MD        magnesium hydroxide (MILK OF MAGNESIA) 400 MG/5ML suspension 15 mL  15 mL Oral Daily PRN Juan Manuel Page MD        melatonin tablet 6 mg  6 mg Oral Nightly Shruthi Isabel APRN        metoclopramide (REGLAN) tablet 5 mg  5 mg Oral TID AC Juan Manuel Page MD        nitroglycerin (NITROSTAT) SL tablet 0.4 mg  0.4 mg Sublingual Q5 Min PRN Shruthi Isabel APRN        ondansetron (ZOFRAN) tablet 4 mg  4 mg Oral Q6H PRN Juan Manuel Page MD        Or    ondansetron (ZOFRAN) injection 4 mg  4 mg Intravenous Q6H PRN Juan Manuel Page MD        [START ON 10/11/2023] oxyCODONE (oxyCONTIN) 12 hr tablet 10 mg  10 mg Oral Q24H Juan Manuel Page MD        oxyCODONE (ROXICODONE) immediate  release tablet 5 mg  5 mg Oral Q8H PRN Juan Manuel Page MD        pantoprazole (PROTONIX) EC tablet 40 mg  40 mg Oral BID Juan Manuel Page MD        polyethylene glycol (MIRALAX) packet 17 g  17 g Oral Daily Juan Manuel Page MD        pregabalin (LYRICA) capsule 100 mg  100 mg Oral Nightly Juan Manuel Page MD        pregabalin (LYRICA) capsule 50 mg  50 mg Oral Daily Juan Manuel Page MD        [START ON 10/11/2023] rosuvastatin (CRESTOR) tablet 10 mg  10 mg Oral Nightly Juan Manuel Page MD        saccharomyces boulardii (FLORASTOR) capsule 250 mg  250 mg Oral BID Juan Manuel Page MD        sennosides-docusate (PERICOLACE) 8.6-50 MG per tablet 1 tablet  1 tablet Oral Nightly Juan Manuel Page MD        sodium chloride 0.9 % flush 10 mL  10 mL Intravenous Q12H Juan Manuel Page MD        sodium chloride 0.9 % flush 10 mL  10 mL Intravenous PRN Juan Manuel Page MD        sucralfate (CARAFATE) tablet 1 g  1 g Oral TID AC Juan Manuel Page MD        tamsulosin (FLOMAX) 24 hr capsule 0.4 mg  0.4 mg Oral Daily Juan Manuel Page MD        timolol (TIMOPTIC) 0.5 % ophthalmic solution 1 drop  1 drop Both Eyes Q12H Juan Manuel Page MD        valsartan (DIOVAN) tablet 40 mg  40 mg Oral Nightly Juan Manuel Page MD           Past Medical History:  Past Medical History:   Diagnosis Date    Arthritis     Autonomic disease     CAD (coronary artery disease) 02/06/2017    Cervical radiculopathy 09/16/2021    Chronic constipation with acute exaccerbation 05/10/2021    Coronary artery disease     Degeneration of cervical intervertebral disc 08/11/2021    Diabetes mellitus     Diabetic foot ulcer 08/31/2020    Diabetic polyneuropathy associated with type 2 diabetes mellitus 01/18/2021    Elevated cholesterol     Gastroesophageal reflux disease 05/13/2019    Headache     HTN (hypertension), benign 05/03/2017    Hyperlipidemia     Hypertension     Mixed  hyperlipidemia 02/07/2017    Multiple lung nodules 01/26/2020    5mm, 9 mm RLL identified 1/2020, not present 10/2019.    Myocardial infarction     Osteomyelitis 01/22/2020    Osteomyelitis of fifth toe of right foot 10/07/2019    Pancreatitis     Persistent insomnia 01/20/2020    Renal disorder     Sleep apnea 02/06/2017    Sleep apnea with use of continuous positive airway pressure (CPAP)     NON-COMPLIANT    Slow transit constipation 01/16/2019    Spinal stenosis in cervical region 09/16/2021    Vitamin D deficiency 03/02/2021       Past Surgical History:  Past Surgical History:   Procedure Laterality Date    ABDOMINAL SURGERY      AMPUTATION FOOT / TOE Left 10/2021    5th digit     ANTERIOR CERVICAL DISCECTOMY W/ FUSION N/A 8/5/2022    Procedure: CERVICAL DISCECTOMY ANTERIOR WITH FUSION C5-6 with possible plating of C5-7 with neuromonitoring and 1 c-arm;  Surgeon: Karel Soliz MD;  Location:  [facility] OR;  Service: Neurosurgery;  Laterality: N/A;    APPENDECTOMY      BACK SURGERY      CARDIAC CATHETERIZATION Left 02/08/2021    Procedure: Left Heart Cath w poss intervention left anatomical snuff box acess;  Surgeon: Omkar Charles DO;  Location:  [facility] CATH INVASIVE LOCATION;  Service: Cardiology;  Laterality: Left;    CARDIAC SURGERY      CATARACT EXTRACTION      CERVICAL SPINE SURGERY      COLONOSCOPY N/A 01/31/2017    Normal exam repeat in 5 years    COLONOSCOPY N/A 02/11/2019    Mild acute inflammation    COLONOSCOPY W/ POLYPECTOMY  03/04/2014    Hyperplastic polyp    CORONARY ARTERY BYPASS GRAFT  10/2015    ENDOSCOPY  04/13/2011    Gastritis with hemorrhage    ENDOSCOPY N/A 05/05/2017    Normal exam    ENDOSCOPY N/A 02/11/2019    Gastritis    ENDOSCOPY N/A 09/01/2020    Non-erosive gastritis with hemorrhage    ENDOSCOPY N/A 02/10/2021    Esophagitis    FOOT SURGERY Left     INCISION AND DRAINAGE OF WOUND Left 09/2007    spider bite       Family History  Family History   Problem Relation Age of  Onset    Colon cancer Father     Heart disease Father     Colon cancer Sister     Colon polyps Sister     Alzheimer's disease Mother     Coronary artery disease Sister     Coronary artery disease Sister        Social History  Social History     Socioeconomic History    Marital status:    Tobacco Use    Smoking status: Former     Types: Cigarettes     Quit date:      Years since quittin.7    Smokeless tobacco: Never    Tobacco comments:     smoked in highschool   Vaping Use    Vaping Use: Never used   Substance and Sexual Activity    Alcohol use: No    Drug use: No    Sexual activity: Defer       Review of Systems:  History obtained from chart review and the patient  General ROS: No fever or chills  Respiratory ROS: No cough, shortness of breath, wheezing  Cardiovascular ROS: No chest pain or palpitations  Gastrointestinal ROS: No abdominal pain or melena  Genito-Urinary ROS: No dysuria or hematuria  Psych ROS: No anxiety and depression  14 point ROS reviewed with the patient and negative except as noted above and in the HPI unless unable to obtain.    Objective:  Blood pressure: 92/63 mmHg  Heart rate is 76 bpm  O2 saturation 97%    General: awake/alert   HEENT: Normocephalic atraumatic head  Neck: Supple with no JVD carotid bruits.  Chest:  clear to auscultation bilaterally without respiratory distress  CVS: regular rate and rhythm  Abdominal: soft, nontender, positive bowel sounds  Extremities:  Left foot diabetic ulcer at plantar aspect  Skin: warm and dry without rash      Labs:  Results from last 7 days   Lab Units 10/09/23  0554 10/05/23  0502   WBC 10*3/mm3 7.81 6.98   HEMOGLOBIN g/dL 9.5* 8.8*   HEMATOCRIT % 32.0* 29.3*   PLATELETS 10*3/mm3 280 244         Results from last 7 days   Lab Units 10/10/23  0701 10/09/23  0554 10/05/23  0502   SODIUM mmol/L 144 140 140   POTASSIUM mmol/L 4.8 4.4 4.2   CHLORIDE mmol/L 99 96* 100   CO2 mmol/L 33.0* 31.0* 28.0   BUN mg/dL 46* 46* 42*   CREATININE  mg/dL 3.49* 3.15* 2.55*   CALCIUM mg/dL 9.6 9.7 9.4   EGFR mL/min/1.73 18.4* 20.8* 26.8*   GLUCOSE mg/dL 134* 181* 78       Radiology:   Imaging Results (Last 72 Hours)       Procedure Component Value Units Date/Time    XR Shoulder 2+ View Bilateral [[id number]] Resulted: 10/10/23 1352     Updated: 10/10/23 1452    CT Cervical Spine Without Contrast [[id number]] Collected: 10/10/23 0956     Updated: 10/10/23 1005    Narrative:      EXAM: CT CERVICAL SPINE WO CONTRAST- - 10/10/2023 9:25 AM CDT     HISTORY: Spinal cord injury, follow up       COMPARISON: 11/7/2021.     DOSE LENGTH PRODUCT: 402.3 mGy cm. Automated exposure control was also  utilized to decrease patient radiation dose.     TECHNIQUE: Unenhanced CT of the cervical spine performed with  multiplanar reformats.     FINDINGS:  C1 through mid T2 visualized. 7 cervical vertebral bodies. Anterior  cervical discectomy and fixation hardware C5-C7. There appears to be  near complete bony fusion across C6-7. No evidence of hardware  loosening.     Normal alignment. The head is tilted toward the LEFT, which could be  positional or seen with torticollis. Normal vertebral body height. No  evidence of acute fracture. Limited evaluation of the spinal canal given  CT technique. Mild diffuse native disc height loss, moderate at C7-T1.     C2-C3: Small disc osteophyte complex. No convincing spinal canal or  foraminal stenosis.  C3-C4: Disc osteophyte complex. Severe appearing spinal canal stenosis  with 8 x 13 mm central disc protrusion indenting the ventral spinal  cord. No RIGHT and mild LEFT foraminal stenosis.  C4-C5: Disc osteophyte complex. Severe appearing spinal canal stenosis  indenting the ventral spinal cord. No foraminal stenosis.  C5-C6: Discectomy. Residual or recurrent disc osteophyte complex. Severe  spinal canal stenosis. Moderate to severe bilateral foraminal stenosis.  Mild bilateral facet hypertrophy.  C6-C7: Discectomy. Spinal canal and foramen appear  patent.  C7-T1: Disc osteophyte complex. Limited evaluation of the spinal canal  due to beam starvation. Foramen appear patent.     Overlying soft tissues appear within normal limits.          Impression:      1. Severe appearing spinal canal stenosis at C3-4, C4-5 and C5-6.  2. The head is tilted toward the LEFT, which could be positional or seen  with torticollis.  3. Postoperative changes C5-C7. No evidence of hardware loosening.        This report was signed and finalized on 10/10/2023 10:02 AM CDT by Dr Bree Hannah MD.       CT Head Without Contrast [[id number]] Collected: 10/10/23 0942     Updated: 10/10/23 0953    Narrative:      EXAMINATION: CT HEAD WO CONTRAST-      10/10/2023 9:25 AM CDT     HISTORY: Head trauma, minor (Age >= 65y)     In order to have a CT radiation dose as low as reasonably achievable  Automated Exposure Control was utilized for adjustment of the mA and/or  KV according to patient size.     DLP in mGycm= 780     The CT scan of the head is performed without intravenous contrast  enhancement.     The images are acquired in axial plane with subsequent reconstruction in  coronal and sagittal planes.     Comparison is made with the previous study dated 8/21/2023.     There is no evidence of intracranial hemorrhage or hematoma.     There is no evidence of a mass. No midline shift.     Moderately prominent ventricles, basal cisterns and cortical sulci are  age appropriate.     The gray-white matter differentiation is maintained.     Images reviewed in bone window show no evidence of a skull fracture.  Limited visualized paranasal sinuses and mastoid air cells are  unremarkable. Severe atheromatous changes of the intracranial internal  carotid arteries in the cavernous sinus bilaterally are noted.  Atheromatous changes of the intracranial vertebral arteries bilaterally  right more than the left are noted.       Impression:      1. No acute intracranial abnormality.  2. No skull fracture.                                 This report was signed and finalized on 10/10/2023 9:50 AM CDT by Dr. Carlos Cutler MD.       XR Abdomen KUB [[id number]] Collected: 10/07/23 2124     Updated: 10/07/23 2128    Narrative:      EXAMINATION: XR ABDOMEN KUB-  10/7/2023 9:24 PM CDT     HISTORY: Acute vomiting.     FINDINGS: KUB radiograph demonstrates moderate constipation with a large  volume of stool throughout the colon. There is moderate gaseous  distention of the stomach. No free air.       Impression:      1.. Moderate constipation.  2. Moderate gaseous distention of the stomach.     This report was signed and finalized on 10/7/2023 9:25 PM CDT by Dr. Phillip Hart MD.               Culture:  No results found for: BLOODCX, URINECX, WOUNDCX, MRSACX, RESPCX, STOOLCX      Assessment   1.  Acute kidney injury/stage I/worsening  2..Acute tubular necrosis  3.  Stage IIIb chronic kidney disease baseline.  3.  Status post debridement of diabetic foot ulcer  4.  Chronic diastolic CHF.  5.  Proteus wound infection  6.  Insulin-dependent type 2 diabetes.  7.  History of gastroparesis.  8.  Secondary hyperparathyroidism.  9.  Anemia of CKD.  10.  Type II diabetic nephropathy.  11.  Currently hypotensive    Plan:  1.  IV fluid bolus  2.  Midodrine 2.5 mg p.o. 3 times daily.  3.  Continue to monitor renal function closely.    Brian Lomas MD  10/10/2023  15:47 CDT

## 2024-06-12 NOTE — ASSESSMENT & PLAN NOTE
Intermittent tingling of the upper extremities mostly right side.  Often this is after working in the garden, working with her hands etc..  She seems to have trigger points in the trapezius and thoracic as well as lower cervical region that she can massage out and the symptoms resolve.  Recommend regular stretching, proper posture.  Avoidance of positions and activities that aggravate this.  Offered PT but symptoms or control for now but she will let me know if worsening

## 2024-06-12 NOTE — ASSESSMENT & PLAN NOTE
Lipids improved with rosuvastatin.  Nutrition remains mostly healthy.  Continue current medications along with healthy lifestyle

## 2024-06-12 NOTE — PROGRESS NOTES
Primary Care Provider Appointment   [facility]       Patient ID: Bhavani Hill is a 68 y.o. female.    ASSESSMENT/PLAN by Problem List:    1. Essential hypertension  Overview:  since ~ 2000    Assessment & Plan:  Blood pressure remains satisfactory.  She will continue current medications.  Recommend low-sodium diet and regular exercise.    Orders:  -     Comprehensive Metabolic Panel; Future; Expected date: 06/12/2024  -     CBC Auto Differential; Future; Expected date: 06/12/2024    2. Elevated cholesterol  Assessment & Plan:  Lipids improved with rosuvastatin.  Nutrition remains mostly healthy.  Continue current medications along with healthy lifestyle    Orders:  -     Lipid Panel; Future; Expected date: 06/12/2024    3. Aortic atherosclerosis  Assessment & Plan:  Continue rosuvastatin along with healthy lifestyle    Orders:  -     Lipid Panel; Future; Expected date: 06/12/2024    4. Hypothyroidism, unspecified type  Overview:  since 2008    Assessment & Plan:  Stable continue levothyroxine.    Orders:  -     TSH; Future; Expected date: 06/12/2024    5. Tingling in extremities  Assessment & Plan:  Intermittent tingling of the upper extremities mostly right side.  Often this is after working in the garden, working with her hands etc..  She seems to have trigger points in the trapezius and thoracic as well as lower cervical region that she can massage out and the symptoms resolve.  Recommend regular stretching, proper posture.  Avoidance of positions and activities that aggravate this.  Offered PT but symptoms or control for now but she will let me know if worsening           Follow Up:  Three months    Subjective:     Chief Complaint   Patient presents with    Follow-up     3 month follow up visit    Tingling     Pt states she has some tingling in her arms and hand more so her right arm than left and around the back shoulder blade area     I have reviewed the information  entered by the ancillary staff regarding the chief complaint as well as the related history.    Follow-up  Pertinent negatives include no chest pain, headaches or vomiting.       Patient is a/an 68 y.o.  female     Follow-up labs.  Lipids improved.  Doing well on rosuvastatin    Muscle tightness and tension, neck and shoulders, tingling at times right arm, manageable with massaging,         For complete problem list, past medical history, surgical history, social history, etc., see appropriate section in the electronic medical record    Review of Systems   Constitutional:  Negative for activity change.   HENT:  Negative for hearing loss and trouble swallowing.    Eyes:  Negative for discharge.   Respiratory:  Negative for chest tightness and wheezing.    Cardiovascular:  Negative for chest pain and palpitations.   Gastrointestinal:  Negative for constipation, diarrhea and vomiting.   Genitourinary:  Negative for difficulty urinating and hematuria.   Neurological:  Negative for headaches.   Psychiatric/Behavioral:  Negative for dysphoric mood.        Objective     Physical Exam  Vitals reviewed.   Constitutional:       General: She is not in acute distress.     Appearance: She is well-developed. She is not diaphoretic.   HENT:      Head: Normocephalic and atraumatic.   Eyes:      General: No scleral icterus.  Pulmonary:      Effort: Pulmonary effort is normal. No respiratory distress.   Neurological:      Mental Status: She is alert and oriented to person, place, and time.   Psychiatric:         Mood and Affect: Mood normal.         Behavior: Behavior normal.       Vitals:    06/12/24 1442   BP: 138/82   BP Location: Left arm   Patient Position: Sitting   BP Method: Medium (Manual)   Pulse: 68   Resp: 18   Temp: 98.4 °F (36.9 °C)   TempSrc: Oral   SpO2: 98%   Weight: 71.5 kg (157 lb 8.3 oz)           THIS DOCUMENT WAS MADE IN PART WITH VOICE RECOGNITION SOFTWARE.  OCCASIONALLY THIS SOFTWARE WILL MISINTERPRET WORDS OR  PHRASES.

## 2024-06-14 ENCOUNTER — TELEPHONE (OUTPATIENT)
Dept: HEMATOLOGY/ONCOLOGY | Facility: CLINIC | Age: 69
End: 2024-06-14
Payer: MEDICARE

## 2024-06-14 NOTE — TELEPHONE ENCOUNTER
Spoke with the pt and got the pt rescheduled with labs and md. Pt stated that she will be of [facility]. Pt verbalized and understanding.  ----- Message from Nathalia Buck sent at 6/14/2024  1:23 PM CDT -----  Regarding: Dr. Perez  Type: Needs Medical Advice  Who Called:  Patient   Symptoms (please be specific):    How long has patient had these symptoms:    Pharmacy name and phone #:   Best Call Back Number: [phone]  Additional Information: Patient is requesting a call back to reschedule an appt..

## 2024-07-11 NOTE — TELEPHONE ENCOUNTER
----- Message from Kunal Rojo sent at 6/21/2022 10:17 AM CDT -----  Type: Needs Medical Advice  Who Called:  Pt    Best Call Back Number: [phone]   Additional Information: Sts she would like a call back from the nurse.  Please advise -- Thank you         4

## 2024-08-01 ENCOUNTER — TELEPHONE (OUTPATIENT)
Dept: PRIMARY CARE CLINIC | Facility: CLINIC | Age: 69
End: 2024-08-01

## 2024-08-01 NOTE — TELEPHONE ENCOUNTER
----- Message from Kaci Eckert sent at 8/1/2024  4:19 PM CDT -----  Regarding: pt advice- travel letter  [phone] - call back ; requesting a letter form  that she is being treated by him for possible anxiety when she's in  a vehicle as a passenger for their  [employer] trip. If you the nurse can give her a call for this.      Kaci

## 2024-08-01 NOTE — TELEPHONE ENCOUNTER
Spoke with pt, pt is going on a road trip for [employer] and needs a letter stating that pt is treated for anxiety when she is a passenger in a vehicle, so pt can sit in a reserved seat for medical condition.      The trip is middle of September.     Please call pt if there are any problems with her request.

## 2024-08-10 DIAGNOSIS — F40.248 OTHER SITUATIONAL TYPE PHOBIA: ICD-10-CM

## 2024-08-12 RX ORDER — ALPRAZOLAM 0.5 MG/1
TABLET ORAL
Qty: 15 TABLET | Refills: 0 | Status: SHIPPED | OUTPATIENT
Start: 2024-08-12

## 2024-09-16 ENCOUNTER — OFFICE VISIT (OUTPATIENT)
Dept: PRIMARY CARE CLINIC | Facility: CLINIC | Age: 69
End: 2024-09-16
Payer: MEDICARE

## 2024-09-16 VITALS
HEART RATE: 65 BPM | HEIGHT: 64 IN | DIASTOLIC BLOOD PRESSURE: 86 MMHG | SYSTOLIC BLOOD PRESSURE: 132 MMHG | WEIGHT: 159.19 LBS | BODY MASS INDEX: 27.18 KG/M2 | OXYGEN SATURATION: 99 %

## 2024-09-16 DIAGNOSIS — K62.5 BRBPR (BRIGHT RED BLOOD PER RECTUM): ICD-10-CM

## 2024-09-16 DIAGNOSIS — K59.00 CONSTIPATION, UNSPECIFIED CONSTIPATION TYPE: ICD-10-CM

## 2024-09-16 DIAGNOSIS — Z12.11 COLON CANCER SCREENING: Primary | ICD-10-CM

## 2024-09-16 DIAGNOSIS — R19.4 CHANGE IN BOWEL HABIT: ICD-10-CM

## 2024-09-16 PROCEDURE — 1126F AMNT PAIN NOTED NONE PRSNT: CPT | Mod: CPTII,S$GLB,, | Performed by: FAMILY MEDICINE

## 2024-09-16 PROCEDURE — 99214 OFFICE O/P EST MOD 30 MIN: CPT | Mod: S$GLB,,, | Performed by: FAMILY MEDICINE

## 2024-09-16 PROCEDURE — 3079F DIAST BP 80-89 MM HG: CPT | Mod: CPTII,S$GLB,, | Performed by: FAMILY MEDICINE

## 2024-09-16 PROCEDURE — 1159F MED LIST DOCD IN RCRD: CPT | Mod: CPTII,S$GLB,, | Performed by: FAMILY MEDICINE

## 2024-09-16 PROCEDURE — 1123F ACP DISCUSS/DSCN MKR DOCD: CPT | Mod: CPTII,S$GLB,, | Performed by: FAMILY MEDICINE

## 2024-09-16 PROCEDURE — 1101F PT FALLS ASSESS-DOCD LE1/YR: CPT | Mod: CPTII,S$GLB,, | Performed by: FAMILY MEDICINE

## 2024-09-16 PROCEDURE — 3075F SYST BP GE 130 - 139MM HG: CPT | Mod: CPTII,S$GLB,, | Performed by: FAMILY MEDICINE

## 2024-09-16 PROCEDURE — 4010F ACE/ARB THERAPY RXD/TAKEN: CPT | Mod: CPTII,S$GLB,, | Performed by: FAMILY MEDICINE

## 2024-09-16 PROCEDURE — 99999 PR PBB SHADOW E&M-EST. PATIENT-LVL IV: CPT | Mod: PBBFAC,,, | Performed by: FAMILY MEDICINE

## 2024-09-16 PROCEDURE — 3288F FALL RISK ASSESSMENT DOCD: CPT | Mod: CPTII,S$GLB,, | Performed by: FAMILY MEDICINE

## 2024-09-16 PROCEDURE — 1160F RVW MEDS BY RX/DR IN RCRD: CPT | Mod: CPTII,S$GLB,, | Performed by: FAMILY MEDICINE

## 2024-09-16 PROCEDURE — 3008F BODY MASS INDEX DOCD: CPT | Mod: CPTII,S$GLB,, | Performed by: FAMILY MEDICINE

## 2024-09-16 NOTE — PROGRESS NOTES
Primary Care Provider Appointment   [facility] [place]       Patient ID: Bhavani Hill is a 69 y.o. female.    ASSESSMENT/PLAN by Problem List:    1. Colon cancer screening  -     Case Request Endoscopy: COLONOSCOPY    2. Change in bowel habit    3. BRBPR (bright red blood per rectum)    4. Constipation, unspecified constipation type     Patient presents today after having significant change in GI symptoms including pain, cramping, diarrhea, constipation and some blood.  She is starting to do better now but had to cancel her trip which was scheduled to leave today because of these acute symptoms and not being able to ride on a bus without reliable access to a bathroom.  I think this is a reasonable request.      Bleeding is most likely from hemorrhoids however with this change I do not recommend continuing annual fecal occult blood testing at this point, recommend proceeding with a colonoscopy this year.  Referral entered.    I do recommend taking the stool softener on a daily basis and adding fiber.  If this does not adequately alleviate symptoms she will let us know.    Follow Up:  Keep regularly scheduled follow-up in one month    Advance Care Planning     Date: 09/16/2024    Power of   I initiated the process of voluntary advance care planning today and explained the importance of this process to the patient.  I introduced the concept of advance directives to the patient, as well. Then the patient received detailed information about the importance of designating a Health Care Power of  (HCPOA). She was also instructed to communicate with this person about their wishes for future healthcare, should she become sick and lose decision-making capacity. The patient has previously appointed a HCPOA. After our discussion, the patient has decided to complete a HCPOA and has appointed her significant other, health care agent:  Rip  & health care agent number:  see chart .  I encouraged her to communicate with this person about their wishes for future healthcare, should she become sick and lose decision-making capacity.      Living will and healthcare power-of- both on file, reviewed today no changes             Subjective:     Chief Complaint   Patient presents with    Constipation     Blood on stool, bright red this happened on wed.      On the tp this morning.      Hemorrhoids     One hangs down, saw blood dripping.       I have reviewed the information entered by the ancillary staff regarding the chief complaint as well as the related history.    HPI    Patient is a/an 69 y.o.  female     Patient recently with significant GI symptoms that is sorry with cramping, constipation, blood in his stool, this was followed by diarrhea.  Symptoms started late last week.  They are a little better but she still very irregular and had to cancel a scheduled trip today.  She was going with [employer] on a bus tour up to [place].  But with this pain, irregularity, and now diarrhea, she did not feel comfortable getting on the bus not having reliable access to a restroom.  Few episodes of blood in stool.  After BM, bright red  +constipation  She has had blood in his stool after constipation in the past.  Had been on Senakot with stool softener, pretty regularly after abdominal surgery but at the beginning of the year stop the Senokot and just proceeded with the stool softeners.  However with the stool softeners she would have several bowel movement today, unpredictable, that often interfere with activities, going out, etc. so she stopped that recently which may have triggered the current symptoms.  She has since restarted.        For complete problem list, past medical history, surgical history, social history, etc., see appropriate section in the electronic medical record    Review of Systems   Constitutional:  Negative for activity change and unexpected weight change.   HENT:   "Negative for hearing loss, rhinorrhea and trouble swallowing.    Eyes:  Negative for discharge and visual disturbance.   Respiratory:  Negative for chest tightness and wheezing.    Cardiovascular:  Negative for chest pain and palpitations.   Gastrointestinal:  Positive for abdominal pain, blood in stool, constipation and diarrhea. Negative for vomiting.   Endocrine: Negative for polydipsia and polyuria.   Genitourinary:  Negative for difficulty urinating, dysuria, hematuria and menstrual problem.   Musculoskeletal:  Negative for arthralgias, joint swelling and neck pain.   Neurological:  Negative for weakness and headaches.   Psychiatric/Behavioral:  Negative for confusion and dysphoric mood.        Objective     Physical Exam  Vitals reviewed.   Constitutional:       General: She is not in acute distress.     Appearance: She is well-developed. She is not ill-appearing.   HENT:      Head: Normocephalic and atraumatic.   Eyes:      General: No scleral icterus.     Conjunctiva/sclera: Conjunctivae normal.   Cardiovascular:      Rate and Rhythm: Normal rate and regular rhythm.      Heart sounds: Normal heart sounds. No murmur heard.  Pulmonary:      Effort: Pulmonary effort is normal. No respiratory distress.      Breath sounds: Normal breath sounds. No wheezing or rales.   Abdominal:      General: Abdomen is flat. Bowel sounds are normal. There is no distension.      Tenderness: There is no abdominal tenderness. There is no guarding or rebound.   Skin:     General: Skin is dry.      Coloration: Skin is not jaundiced.      Findings: No rash.   Neurological:      Mental Status: She is alert and oriented to person, place, and time.   Psychiatric:         Behavior: Behavior normal.       Vitals:    09/16/24 1450   BP: 132/86   BP Location: Right arm   Patient Position: Sitting   BP Method: Small (Manual)   Pulse: 65   SpO2: 99%   Weight: 72.2 kg (159 lb 2.8 oz)   Height: 5' 4" (1.626 m)           THIS DOCUMENT WAS MADE IN " PART WITH VOICE RECOGNITION SOFTWARE.  OCCASIONALLY THIS SOFTWARE WILL MISINTERPRET WORDS OR PHRASES.

## 2024-10-01 DIAGNOSIS — F40.248 OTHER SITUATIONAL TYPE PHOBIA: ICD-10-CM

## 2024-10-01 RX ORDER — ALPRAZOLAM 0.5 MG/1
TABLET ORAL
Qty: 15 TABLET | Refills: 0 | Status: SHIPPED | OUTPATIENT
Start: 2024-10-01

## 2024-10-10 ENCOUNTER — LAB VISIT (OUTPATIENT)
Dept: LAB | Facility: HOSPITAL | Age: 69
End: 2024-10-10
Attending: INTERNAL MEDICINE
Payer: MEDICARE

## 2024-10-10 DIAGNOSIS — I10 ESSENTIAL HYPERTENSION: Chronic | ICD-10-CM

## 2024-10-10 DIAGNOSIS — D50.9 IRON DEFICIENCY ANEMIA, UNSPECIFIED IRON DEFICIENCY ANEMIA TYPE: ICD-10-CM

## 2024-10-10 DIAGNOSIS — E78.00 ELEVATED CHOLESTEROL: ICD-10-CM

## 2024-10-10 DIAGNOSIS — E03.9 HYPOTHYROIDISM, UNSPECIFIED TYPE: ICD-10-CM

## 2024-10-10 DIAGNOSIS — I70.0 AORTIC ATHEROSCLEROSIS: Chronic | ICD-10-CM

## 2024-10-10 LAB
ALBUMIN SERPL BCP-MCNC: 3.6 G/DL (ref 3.5–5.2)
ALP SERPL-CCNC: 78 U/L (ref 55–135)
ALT SERPL W/O P-5'-P-CCNC: 14 U/L (ref 10–44)
ANION GAP SERPL CALC-SCNC: 11 MMOL/L (ref 8–16)
AST SERPL-CCNC: 25 U/L (ref 10–40)
BASOPHILS # BLD AUTO: 0.04 K/UL (ref 0–0.2)
BASOPHILS # BLD AUTO: 0.04 K/UL (ref 0–0.2)
BASOPHILS NFR BLD: 1.2 % (ref 0–1.9)
BASOPHILS NFR BLD: 1.2 % (ref 0–1.9)
BILIRUB SERPL-MCNC: 0.6 MG/DL (ref 0.1–1)
BUN SERPL-MCNC: 11 MG/DL (ref 8–23)
CALCIUM SERPL-MCNC: 8.5 MG/DL (ref 8.7–10.5)
CHLORIDE SERPL-SCNC: 99 MMOL/L (ref 95–110)
CHOLEST SERPL-MCNC: 151 MG/DL (ref 120–199)
CHOLEST/HDLC SERPL: 1.8 {RATIO} (ref 2–5)
CO2 SERPL-SCNC: 25 MMOL/L (ref 23–29)
CREAT SERPL-MCNC: 0.8 MG/DL (ref 0.5–1.4)
DIFFERENTIAL METHOD BLD: ABNORMAL
DIFFERENTIAL METHOD BLD: ABNORMAL
EOSINOPHIL # BLD AUTO: 0.1 K/UL (ref 0–0.5)
EOSINOPHIL # BLD AUTO: 0.1 K/UL (ref 0–0.5)
EOSINOPHIL NFR BLD: 2.3 % (ref 0–8)
EOSINOPHIL NFR BLD: 2.3 % (ref 0–8)
ERYTHROCYTE [DISTWIDTH] IN BLOOD BY AUTOMATED COUNT: 13.3 % (ref 11.5–14.5)
ERYTHROCYTE [DISTWIDTH] IN BLOOD BY AUTOMATED COUNT: 13.3 % (ref 11.5–14.5)
EST. GFR  (NO RACE VARIABLE): >60 ML/MIN/1.73 M^2
FERRITIN SERPL-MCNC: 10 NG/ML (ref 20–300)
GLUCOSE SERPL-MCNC: 95 MG/DL (ref 70–110)
HCT VFR BLD AUTO: 28.9 % (ref 37–48.5)
HCT VFR BLD AUTO: 28.9 % (ref 37–48.5)
HDLC SERPL-MCNC: 85 MG/DL (ref 40–75)
HDLC SERPL: 56.3 % (ref 20–50)
HGB BLD-MCNC: 9.5 G/DL (ref 12–16)
HGB BLD-MCNC: 9.5 G/DL (ref 12–16)
IMM GRANULOCYTES # BLD AUTO: 0 K/UL (ref 0–0.04)
IMM GRANULOCYTES # BLD AUTO: 0 K/UL (ref 0–0.04)
IMM GRANULOCYTES NFR BLD AUTO: 0 % (ref 0–0.5)
IMM GRANULOCYTES NFR BLD AUTO: 0 % (ref 0–0.5)
IRON SERPL-MCNC: 20 UG/DL (ref 30–160)
LDLC SERPL CALC-MCNC: 55.6 MG/DL (ref 63–159)
LYMPHOCYTES # BLD AUTO: 1.3 K/UL (ref 1–4.8)
LYMPHOCYTES # BLD AUTO: 1.3 K/UL (ref 1–4.8)
LYMPHOCYTES NFR BLD: 37.8 % (ref 18–48)
LYMPHOCYTES NFR BLD: 37.8 % (ref 18–48)
MCH RBC QN AUTO: 25.8 PG (ref 27–31)
MCH RBC QN AUTO: 25.8 PG (ref 27–31)
MCHC RBC AUTO-ENTMCNC: 32.9 G/DL (ref 32–36)
MCHC RBC AUTO-ENTMCNC: 32.9 G/DL (ref 32–36)
MCV RBC AUTO: 79 FL (ref 82–98)
MCV RBC AUTO: 79 FL (ref 82–98)
MONOCYTES # BLD AUTO: 0.5 K/UL (ref 0.3–1)
MONOCYTES # BLD AUTO: 0.5 K/UL (ref 0.3–1)
MONOCYTES NFR BLD: 13.5 % (ref 4–15)
MONOCYTES NFR BLD: 13.5 % (ref 4–15)
NEUTROPHILS # BLD AUTO: 1.5 K/UL (ref 1.8–7.7)
NEUTROPHILS # BLD AUTO: 1.5 K/UL (ref 1.8–7.7)
NEUTROPHILS NFR BLD: 45.2 % (ref 38–73)
NEUTROPHILS NFR BLD: 45.2 % (ref 38–73)
NONHDLC SERPL-MCNC: 66 MG/DL
NRBC BLD-RTO: 0 /100 WBC
NRBC BLD-RTO: 0 /100 WBC
PLATELET # BLD AUTO: 205 K/UL (ref 150–450)
PLATELET # BLD AUTO: 205 K/UL (ref 150–450)
PMV BLD AUTO: 10.4 FL (ref 9.2–12.9)
PMV BLD AUTO: 10.4 FL (ref 9.2–12.9)
POTASSIUM SERPL-SCNC: 3.5 MMOL/L (ref 3.5–5.1)
PROT SERPL-MCNC: 6.3 G/DL (ref 6–8.4)
RBC # BLD AUTO: 3.68 M/UL (ref 4–5.4)
RBC # BLD AUTO: 3.68 M/UL (ref 4–5.4)
SATURATED IRON: 5 % (ref 20–50)
SODIUM SERPL-SCNC: 135 MMOL/L (ref 136–145)
TOTAL IRON BINDING CAPACITY: 432 UG/DL (ref 250–450)
TRANSFERRIN SERPL-MCNC: 292 MG/DL (ref 200–375)
TRIGL SERPL-MCNC: 52 MG/DL (ref 30–150)
TSH SERPL DL<=0.005 MIU/L-ACNC: 2.31 UIU/ML (ref 0.4–4)
WBC # BLD AUTO: 3.41 K/UL (ref 3.9–12.7)
WBC # BLD AUTO: 3.41 K/UL (ref 3.9–12.7)

## 2024-10-10 PROCEDURE — 83540 ASSAY OF IRON: CPT | Performed by: INTERNAL MEDICINE

## 2024-10-10 PROCEDURE — 85025 COMPLETE CBC W/AUTO DIFF WBC: CPT | Mod: PN | Performed by: INTERNAL MEDICINE

## 2024-10-10 PROCEDURE — 36415 COLL VENOUS BLD VENIPUNCTURE: CPT | Mod: PN | Performed by: INTERNAL MEDICINE

## 2024-10-10 PROCEDURE — 80053 COMPREHEN METABOLIC PANEL: CPT | Mod: PN | Performed by: FAMILY MEDICINE

## 2024-10-10 PROCEDURE — 84443 ASSAY THYROID STIM HORMONE: CPT | Performed by: FAMILY MEDICINE

## 2024-10-10 PROCEDURE — 84238 ASSAY NONENDOCRINE RECEPTOR: CPT | Performed by: INTERNAL MEDICINE

## 2024-10-10 PROCEDURE — 82728 ASSAY OF FERRITIN: CPT | Performed by: INTERNAL MEDICINE

## 2024-10-10 PROCEDURE — 84466 ASSAY OF TRANSFERRIN: CPT | Performed by: INTERNAL MEDICINE

## 2024-10-10 PROCEDURE — 80061 LIPID PANEL: CPT | Performed by: FAMILY MEDICINE

## 2024-10-12 LAB — STFR SERPL-MCNC: 3.8 MG/L (ref 1.8–4.6)

## 2024-10-16 ENCOUNTER — OFFICE VISIT (OUTPATIENT)
Dept: HEMATOLOGY/ONCOLOGY | Facility: CLINIC | Age: 69
End: 2024-10-16
Payer: MEDICARE

## 2024-10-16 VITALS
TEMPERATURE: 97 F | BODY MASS INDEX: 27.74 KG/M2 | WEIGHT: 162.5 LBS | HEIGHT: 64 IN | DIASTOLIC BLOOD PRESSURE: 85 MMHG | HEART RATE: 78 BPM | RESPIRATION RATE: 17 BRPM | OXYGEN SATURATION: 97 % | SYSTOLIC BLOOD PRESSURE: 123 MMHG

## 2024-10-16 DIAGNOSIS — D50.9 IRON DEFICIENCY ANEMIA, UNSPECIFIED IRON DEFICIENCY ANEMIA TYPE: Primary | ICD-10-CM

## 2024-10-16 DIAGNOSIS — K64.9 BLEEDING HEMORRHOID: ICD-10-CM

## 2024-10-16 PROCEDURE — 99999 PR PBB SHADOW E&M-EST. PATIENT-LVL IV: CPT | Mod: PBBFAC,,, | Performed by: INTERNAL MEDICINE

## 2024-10-16 PROCEDURE — 99215 OFFICE O/P EST HI 40 MIN: CPT | Mod: S$GLB,,, | Performed by: INTERNAL MEDICINE

## 2024-10-16 PROCEDURE — 3074F SYST BP LT 130 MM HG: CPT | Mod: CPTII,S$GLB,, | Performed by: INTERNAL MEDICINE

## 2024-10-16 PROCEDURE — 1157F ADVNC CARE PLAN IN RCRD: CPT | Mod: CPTII,S$GLB,, | Performed by: INTERNAL MEDICINE

## 2024-10-16 PROCEDURE — 3008F BODY MASS INDEX DOCD: CPT | Mod: CPTII,S$GLB,, | Performed by: INTERNAL MEDICINE

## 2024-10-16 PROCEDURE — 3079F DIAST BP 80-89 MM HG: CPT | Mod: CPTII,S$GLB,, | Performed by: INTERNAL MEDICINE

## 2024-10-16 PROCEDURE — 4010F ACE/ARB THERAPY RXD/TAKEN: CPT | Mod: CPTII,S$GLB,, | Performed by: INTERNAL MEDICINE

## 2024-10-16 PROCEDURE — 1159F MED LIST DOCD IN RCRD: CPT | Mod: CPTII,S$GLB,, | Performed by: INTERNAL MEDICINE

## 2024-10-16 PROCEDURE — 1126F AMNT PAIN NOTED NONE PRSNT: CPT | Mod: CPTII,S$GLB,, | Performed by: INTERNAL MEDICINE

## 2024-10-16 PROCEDURE — 3288F FALL RISK ASSESSMENT DOCD: CPT | Mod: CPTII,S$GLB,, | Performed by: INTERNAL MEDICINE

## 2024-10-16 PROCEDURE — 1101F PT FALLS ASSESS-DOCD LE1/YR: CPT | Mod: CPTII,S$GLB,, | Performed by: INTERNAL MEDICINE

## 2024-10-16 RX ORDER — SODIUM CHLORIDE 0.9 % (FLUSH) 0.9 %
10 SYRINGE (ML) INJECTION
OUTPATIENT
Start: 2024-10-21

## 2024-10-16 RX ORDER — HEPARIN 100 UNIT/ML
500 SYRINGE INTRAVENOUS
OUTPATIENT
Start: 2024-10-28

## 2024-10-16 RX ORDER — HEPARIN 100 UNIT/ML
500 SYRINGE INTRAVENOUS
OUTPATIENT
Start: 2024-10-21

## 2024-10-16 RX ORDER — SODIUM CHLORIDE 0.9 % (FLUSH) 0.9 %
10 SYRINGE (ML) INJECTION
OUTPATIENT
Start: 2024-10-28

## 2024-10-16 NOTE — PROGRESS NOTES
"  Name: Bhavani Hill  MRN:  [id number]  :  1955 Age 69 y.o.  Date of Service: 10/16/2024    Reason for visit:  Bhavani Hill is a 69 y.o. female here regarding DEREJE.    Hematology History/History of Present Illness:   Patient of Dr. Forde, per his note:     "Mrs Hill is a 68-year-old female referred for iron deficiency anemia.  The most recent labs are from 2023 and they are as follows:  Ferritin was 14 ng/mL  WBCs were 4,800 per cubic mm, hemoglobin 11.6 grams/deciliter, hematocrit 36.1%, MCV 79, and platelets 223 K.  A CBC on 2023 showed a white count of 5300 per cubic mm, hemoglobin 10.6 grams/deciliter, hematocrit 34.2%, MCV 82 and platelets 240 K.  On the same day the ferritin was 9 ng/mL, while 3 stool samples that she submitted were negative for occult blood  A CBC on 2023 had shown a white count of 5400 per cubic mm, hemoglobin 11.6 grams/deciliter, hematocrit 36.1%, MCV 89 and platelets 197 K.  A CBC on May 19, 2022 had shown a white count of 4500 per cubic mm, hemoglobin 12.7 grams/deciliter, hematocrit 39.9%, MCV 85 and platelets 234 K.  She is referred for evaluation.  Other pertinent labs include a ferritin of 10 ng/mL.  On April 3, 2023  Three stool samples she has submitted in mid  were negative for occult blood.     She states that she was prescribed an oral formulation of iron in early  which she did not tolerate well, and she was switched to a different formulation that she can tolerate better."    Interval Hx:   -reports ongoing hemorrhoidal bleeding, + Tried oral iron which caused GI side effects, constipation + hemorrhoids.   -reports she her daughter got  2 weeks ago and she's put off getting her colonoscopy because of wedding planning     PHYSICAL EXAMINATION:  There were no vitals taken for this visit.  Wt Readings from Last 3 Encounters:   24 72.2 kg (159 lb 2.8 oz)   24 71.5 kg (157 lb 8.3 oz)   24 69.3 kg (152 lb 14.2 oz)     ECOG " PERFORMANCE STATUS: 1  Physical Exam   General:  Well-appearing, nontoxic  Eyes:  Equal and round pupils, EOMI, no scleral icterus  Mouth:  No lesions, moist  Cardiovascular:  Warm, well-perfused, no peripheral edema  Lungs:  Unlabored on room air, no wheezing  Neurologic:  Awake, alert and oriented, participating in the exam  Psych:  Appropriate mood and affect  Skin:  Normal pallor, No rashes  Heme:  No petechiae, no purpura      LABORATORY:  CBC  Lab Results   Component Value Date    WBC 3.41 (L) 10/10/2024    WBC 3.41 (L) 10/10/2024    HGB 9.5 (L) 10/10/2024    HGB 9.5 (L) 10/10/2024    HCT 28.9 (L) 10/10/2024    HCT 28.9 (L) 10/10/2024    MCV 79 (L) 10/10/2024    MCV 79 (L) 10/10/2024     10/10/2024     10/10/2024         BMP  Lab Results   Component Value Date     (L) 10/10/2024    K 3.5 10/10/2024    CL 99 10/10/2024    CO2 25 10/10/2024    BUN 11 10/10/2024    CREATININE 0.8 10/10/2024    CALCIUM 8.5 (L) 10/10/2024    ANIONGAP 11 10/10/2024    ESTGFRAFRICA >60.0 05/19/2022    EGFRNONAA >60.0 05/19/2022         PATHOLOGY:      RADIOLOGY:      ASSESSMENT AND PLAN:  Bhavani Hill is a 69 y.o. female with...    # iron-deficiency anemia  -was supposed to have EGD and colonoscopy but her daughter got  over the last 2 weeks so she's put it off a little bit  -her cytopenias formerly resolved with oral iron but she's had ongoing hemorrhoidal bleeding, she reports she will not take oral iron in the future could because she had abundant GI side effects  -iron levels now in iron deicient range   -recommended 2 doses of feraheme to avoid worsening constipation issues     -moving forward can follow exclusively with [facility] for ongoing iron deficiency, will need iron as long as she's having hemorrhoidal bleeding issues, once this is resolved, she can be discharged from hematology clinic.     # hypertension controlled, per primary care      Tanya Perez M.D.  Hematology/Oncology     Route Chart for  Scheduling    Med Onc Chart Routing      Follow up with physician    Follow up with [facility] 4 months. with Ottoniel cbc and iron studies prior   Infusion scheduling note   2 doses of feraheme non urgently   Injection scheduling note    Labs CBC, ferritin and iron and TIBC   Scheduling:  Preferred lab:  Lab interval:     Imaging    Pharmacy appointment    Other referrals                   Answers submitted by the patient for this visit:  Review of Systems Questionnaire (Submitted on 2/28/2024)  appetite change : No  unexpected weight change: No  mouth sores: No  visual disturbance: No  cough: No  shortness of breath: No  chest pain: No  abdominal pain: No  diarrhea: No  frequency: No  back pain: No  rash: No  headaches: No  adenopathy: No  nervous/ anxious: No

## 2024-10-17 ENCOUNTER — TELEPHONE (OUTPATIENT)
Dept: GASTROENTEROLOGY | Facility: CLINIC | Age: 69
End: 2024-10-17
Payer: MEDICARE

## 2024-10-17 PROBLEM — K64.9 BLEEDING HEMORRHOID: Status: ACTIVE | Noted: 2024-10-17

## 2024-10-17 NOTE — TELEPHONE ENCOUNTER
Returned call to the patient, patient requested to schedule her Colonoscopy, procedure scheduled with the patient, patient verbalized understanding of this, prep instructions sent to patient portal.      MiraLAX Prep      ALERT: Please notify the clinic if you start any blood thinners as does affect your procedure  NOTE: NO ASPIRIN or ibuprofen products for the morning of your procedure. This includes Motrin, ALEVE, Advil, or other arthritis type medications. TYLENOL IS ALLOWED.  AVOID the following foods for 7 - 10 days prior to the procedure: Beans, peas, corn, nuts, popcorn, or tomatoes. If you forget we will not cancel your procedure.      **You will need to purchase over-the-counter  -    4 Dulcolax laxative tablets - any brand is fine   -    2 Liter Bottle or 64 oz. of any clear liquid - see list below       (Noncarbonated, Nothing RED or PURPLE)   -    MiraLAX (16 oz ) bottle of powder     The evening before your prep day, at bedtime, take 2 Dulcolax tablets    ONE DAY BEFORE THE PROCEDURE (PREP DAY):   1. You will be on a clear liquid diet the entire day before the procedure.  2. At 10 am you will take 2 more Dulcolax laxative tablets  3. At 5 pm mix one 8oz. glass of clear liquid with TWO capfuls of Miralax and drink it entirely.  4. Repeat every 15 minutes until you have finished the 2 liter/ 64 oz. of clear fluid.      It's about 8 - 10 glasses of fluid. You may have some MiraLAX left over.      CLEAR LIQUIDS INCLUDE: Coffee (no cream), tea, apple juice, white grape juice, limit carbonated drinks to 2 in 24 hours, boullion, chicken broth, beef broth, Gatorade, Manuel-Aid, jello, popsicles, snowballs, Italian ice, and hard candy. NO ALCOHOL. NOTHING RED OR PURPLE.    It is important to drink plenty of clear fluids throughout the day prior to the procedure while doing this prep. After midnight  WATER ONLY is allowed, then nothing by mouth 4 hours prior to the procedure time.    A preop nurse will call the day  prior to your procedure to discuss medications you can and cannot take the morning of your procedure. Since sedation is used, you must have someone drive you home. It is [facility] policy that you may not take a taxi home after sedation.         NOTE:  ·         Dulaglutide (Trulicity) (weekly) - hold 8 days  ·         Exenatide extended release (Bydureon bcise) (weekly) - hold 8 days  ·         Semaglutide (Ozempic) (weekly) - hold 8 days  Tirepatide (Mounjaro) (weekly) - hold 8 days  Wegovy (weekly) - hold 8 days  ·         Exenatide (Byetta) (twice daily)- hold DAY OF PROCEDURE  ·         Liraglutide (Victoza, Saxenda) (daily)- hold DAY OF PROCEDURE  ·         Lixisenatide (Adlyxin) (daily)- hold DAY OF PROCEDURE  ·         Semaglutide (Rybelsus) (daily) -hold DAY OF PROCEDURE

## 2024-10-17 NOTE — TELEPHONE ENCOUNTER
----- Message from Armond sent at 10/17/2024  9:48 AM CDT -----  Contact: self  Type: Needs Medical Advice  Who Called:  Patient    Best Call Back Number: [phone]    Additional Information: Pt states she would like to eneida a colonoscopy.Please call back

## 2024-10-17 NOTE — TELEPHONE ENCOUNTER
----- Message from Bel sent at 10/17/2024 10:47 AM CDT -----  Regarding: call back  Contact: patient  Type: Needs Medical Advice  Who Called:  patient   Symptoms (please be specific):    How long has patient had these symptoms:    Pharmacy name and phone #:    Best Call Back Number: [phone]    Additional Information: hey pt is returning ur call are u available to assist MRN: [id number] [facility]     [phone]

## 2024-10-18 ENCOUNTER — TELEPHONE (OUTPATIENT)
Dept: INFUSION THERAPY | Facility: HOSPITAL | Age: 69
End: 2024-10-18
Payer: MEDICARE

## 2024-10-18 ENCOUNTER — PATIENT MESSAGE (OUTPATIENT)
Dept: INFUSION THERAPY | Facility: HOSPITAL | Age: 69
End: 2024-10-18
Payer: MEDICARE

## 2024-10-18 NOTE — TELEPHONE ENCOUNTER
----- Message from Alberta sent at 10/18/2024  1:40 PM CDT -----  Type: Needs Medical Advice  Who Called:  Patient   Symptoms (please be specific):    How long has patient had these symptoms:    Pharmacy name and phone #:    Best Call Back Number: [phone]  Additional Information: Patient calling to schedule iron infusion from a message she got from Janet through my chart.

## 2024-10-21 ENCOUNTER — OFFICE VISIT (OUTPATIENT)
Dept: PRIMARY CARE CLINIC | Facility: CLINIC | Age: 69
End: 2024-10-21
Payer: MEDICARE

## 2024-10-21 VITALS
BODY MASS INDEX: 27.63 KG/M2 | HEIGHT: 64 IN | SYSTOLIC BLOOD PRESSURE: 128 MMHG | OXYGEN SATURATION: 99 % | DIASTOLIC BLOOD PRESSURE: 88 MMHG | WEIGHT: 161.81 LBS | HEART RATE: 65 BPM

## 2024-10-21 DIAGNOSIS — R91.1 SOLITARY PULMONARY NODULE: ICD-10-CM

## 2024-10-21 DIAGNOSIS — E78.00 ELEVATED CHOLESTEROL: Chronic | ICD-10-CM

## 2024-10-21 DIAGNOSIS — I10 ESSENTIAL HYPERTENSION: Primary | Chronic | ICD-10-CM

## 2024-10-21 DIAGNOSIS — E03.9 HYPOTHYROIDISM, UNSPECIFIED TYPE: Chronic | ICD-10-CM

## 2024-10-21 DIAGNOSIS — I70.0 AORTIC ATHEROSCLEROSIS: Chronic | ICD-10-CM

## 2024-10-21 DIAGNOSIS — Z78.0 POST-MENOPAUSAL: ICD-10-CM

## 2024-10-21 DIAGNOSIS — F41.9 ANXIETY: ICD-10-CM

## 2024-10-21 DIAGNOSIS — R91.1 LUNG NODULE: ICD-10-CM

## 2024-10-21 DIAGNOSIS — D50.9 IRON DEFICIENCY ANEMIA, UNSPECIFIED IRON DEFICIENCY ANEMIA TYPE: ICD-10-CM

## 2024-10-21 PROCEDURE — 3008F BODY MASS INDEX DOCD: CPT | Mod: CPTII,S$GLB,, | Performed by: FAMILY MEDICINE

## 2024-10-21 PROCEDURE — 99999 PR PBB SHADOW E&M-EST. PATIENT-LVL IV: CPT | Mod: PBBFAC,,, | Performed by: FAMILY MEDICINE

## 2024-10-21 PROCEDURE — 1157F ADVNC CARE PLAN IN RCRD: CPT | Mod: CPTII,S$GLB,, | Performed by: FAMILY MEDICINE

## 2024-10-21 PROCEDURE — 99214 OFFICE O/P EST MOD 30 MIN: CPT | Mod: S$GLB,,, | Performed by: FAMILY MEDICINE

## 2024-10-21 PROCEDURE — 1160F RVW MEDS BY RX/DR IN RCRD: CPT | Mod: CPTII,S$GLB,, | Performed by: FAMILY MEDICINE

## 2024-10-21 PROCEDURE — 4010F ACE/ARB THERAPY RXD/TAKEN: CPT | Mod: CPTII,S$GLB,, | Performed by: FAMILY MEDICINE

## 2024-10-21 PROCEDURE — 3074F SYST BP LT 130 MM HG: CPT | Mod: CPTII,S$GLB,, | Performed by: FAMILY MEDICINE

## 2024-10-21 PROCEDURE — 3079F DIAST BP 80-89 MM HG: CPT | Mod: CPTII,S$GLB,, | Performed by: FAMILY MEDICINE

## 2024-10-21 PROCEDURE — 1159F MED LIST DOCD IN RCRD: CPT | Mod: CPTII,S$GLB,, | Performed by: FAMILY MEDICINE

## 2024-10-21 NOTE — PROGRESS NOTES
Primary Care Provider Appointment   [facility] [place]       Patient ID: Bhavani Hill is a 69 y.o. female.    ASSESSMENT/PLAN by Problem List:    Assessment & Plan    Hyperlipidemia well-controlled on rosuvastatin 10 mg; LDL at 55, below target of 70  Chronic iron deficiency anemia persists; not critical but potentially affecting patient's energy levels  Hypothyroidism stable and well-controlled on levothyroxine 50 mcg daily; TSH at 2.3, within normal range  Slightly low calcium level noted; recommending supplement to address  Hemorrhoidal bleeding appears to have improved with current management  Follow-up on previously identified ovarian mass (surgically removed, benign) complete  Left kidney angiomyolipoma confirmed benign on follow-up imaging; no further action needed  Previously noted 8mm lung nodule requires follow-up chest CT to ensure resolution or stability    BONE HEALTH:  - Explained that calcium blood levels do not directly correlate with bone density.  - Started Citracal plus D, 1 tablet daily; advised to monitor for constipation.    LUNG NODULES:  - Discussed common occurrence of lung nodules and scars due to environmental factors.  - Clarified that lung nodules often require follow-up imaging to ensure stability, even if benign.  - Chest CT ordered to follow up on previously noted lung nodule and evaluate entire lung field.  - Follow up to schedule chest [facility] at [facility].    GASTROINTESTINAL HEALTH:  - Bhavani to continue high-fiber diet.  - Bhavani to maintain current water intake habits.  - Continued Colace daily.    HYPERLIPIDEMIA:  - Continued rosuvastatin 10 mg daily.    THYROID DISORDER:  - Continued levothyroxine 50 mcg daily.         ORDERS, CODING, ADDITIONAL DOCUMENTATION RELATED TO THIS ENCOUNTER:  1. Essential hypertension  Overview:  since ~ 2000    Orders:  -     Comprehensive Metabolic Panel; Future; Expected date: 10/21/2024    2.  Hypothyroidism, unspecified type  Overview:  since 2008    Orders:  -     TSH; Future; Expected date: 10/21/2024    3. Iron deficiency anemia, unspecified iron deficiency anemia type    4. Anxiety    5. Aortic atherosclerosis  -     Lipid Panel; Future; Expected date: 10/21/2024    6. Elevated cholesterol  -     Lipid Panel; Future; Expected date: 10/21/2024    7. Solitary pulmonary nodule  -     CT Chest Without Contrast; Future; Expected date: 10/21/2024    8. Lung nodule  -     CT Chest Without Contrast; Future; Expected date: 10/21/2024    9. Post-menopausal  -     DXA Bone Density Axial Skeleton 1 or more sites; Future; Expected date: 10/21/2024           Follow Up:  5-6 months sooner if any concerns.    Health Maintenance         Date Due Completion Date    Colorectal Cancer Screening 06/02/2016 6/2/2015    DEXA Scan 12/17/2024 12/17/2021    Mammogram 04/03/2025 4/3/2024    Override on 7/28/2014: Done    Hemoglobin A1c (Diabetic Prevention Screening) 05/19/2025 5/19/2022    High Dose Statin 10/21/2025 10/21/2024    TETANUS VACCINE 11/21/2026 11/21/2016    Lipid Panel 10/10/2029 10/10/2024        Colonoscopy has been scheduled      Subjective:       Follow-up  Pertinent negatives include no arthralgias, chest pain, headaches, joint swelling, neck pain, vomiting or weakness.       Patient is a/an 69 y.o.  female       For complete problem list, past medical history, surgical history, social history, etc., see appropriate section in the electronic medical record    History of Present Illness    CHIEF COMPLAINT:  Bhavani presents today for follow up.    MEDICATIONS:  She is currently taking rosuvastatin 10 mg for hyperlipidemia without side effects, levothyroxine 50 mcg daily for well-controlled hypothyroidism, and alprazolam as needed for chronic anxiety. She denies any issues or side effects with these medications.    GASTROINTESTINAL:  She reports a history of iron deficiency anemia and has consulted with  Hematology/Oncology. A colonoscopy as scheduled and she is scheduled for iron infusions    CARDIOVASCULAR:  She reports home blood pressure readings are generally very good.    NUTRITION AND HYDRATION:  She consumes dairy products regularly and has undergone bone density tests in the past. She is uncertain about the relationship between her calcium intake, blood calcium levels, and bone density. She is making efforts to increase water intake and acknowledges experiencing dry mouth. She reports decreased appetite, often not feeling hungry for a few days. To improve her eating habits, she is attempting to eat more regularly by incorporating foods she enjoys, such as berries and smoked salmon, into her morning routine.    IMAGING FINDINGS:  Previous imaging revealed a benign growth consistent with angiomyolipoma on the left kidney, identified through multiple scans including an MRI of the abdomen and a subsequent CT scan in October. A small lung nodule was detected in a previous scan but was not visible in a follow-up abdominal CT scan. She has been informed that lung nodules are fairly common and can sometimes resolve on their own or remain as scar tissue.    FAMILY HISTORY:  She reports a family history of breast cancer, with both her mother and sister affected. She expresses awareness of her increased risk and states she has been diligent about getting annual mammograms. She denies any personal history of breast cancer and mentions avoiding breast reduction surgery due to concerns about potential complications or increased cancer risk.      ROS:  General: +loss of appetite  ENT: -difficulty swallowing, +dry mouth  Gastrointestinal: -melena       Review of Systems   Constitutional:  Negative for activity change and unexpected weight change.   HENT:  Negative for hearing loss, rhinorrhea and trouble swallowing.    Eyes:  Negative for discharge and visual disturbance.   Respiratory:  Negative for chest tightness and  "wheezing.    Cardiovascular:  Negative for chest pain and palpitations.   Gastrointestinal:  Negative for blood in stool, constipation, diarrhea and vomiting.   Endocrine: Negative for polydipsia and polyuria.   Genitourinary:  Negative for difficulty urinating, dysuria, hematuria and menstrual problem.   Musculoskeletal:  Negative for arthralgias, joint swelling and neck pain.   Neurological:  Negative for weakness and headaches.   Psychiatric/Behavioral:  Negative for confusion and dysphoric mood.        Objective     Physical Exam  Vitals reviewed.   Constitutional:       General: She is not in acute distress.     Appearance: She is well-developed. She is not diaphoretic.   HENT:      Head: Normocephalic and atraumatic.   Eyes:      General: No scleral icterus.  Pulmonary:      Effort: Pulmonary effort is normal. No respiratory distress.   Neurological:      Mental Status: She is alert and oriented to person, place, and time.   Psychiatric:         Mood and Affect: Mood normal.         Behavior: Behavior normal.       Vitals:    10/21/24 1412   BP: 128/88   BP Location: Left arm   Patient Position: Sitting   Pulse: 65   SpO2: 99%   Weight: 73.4 kg (161 lb 13.1 oz)   Height: 5' 4" (1.626 m)     Physical Exam               This note was generated with the assistance of ambient listening technology. Verbal consent was obtained by the patient and accompanying visitor(s) for the recording of patient appointment to facilitate this note. I attest to having reviewed and edited the generated note for accuracy, though some syntax or spelling errors may persist. Please contact the author of this note for any clarification.  Parts of the note were also generated with voice recognition software.  Occasionally this software will misinterpreted words or phrases    "

## 2024-10-23 ENCOUNTER — OFFICE VISIT (OUTPATIENT)
Dept: FAMILY MEDICINE | Facility: CLINIC | Age: 69
End: 2024-10-23
Payer: MEDICARE

## 2024-10-23 VITALS
BODY MASS INDEX: 27.63 KG/M2 | HEART RATE: 69 BPM | HEIGHT: 64 IN | OXYGEN SATURATION: 96 % | SYSTOLIC BLOOD PRESSURE: 126 MMHG | TEMPERATURE: 99 F | WEIGHT: 161.81 LBS | DIASTOLIC BLOOD PRESSURE: 80 MMHG

## 2024-10-23 DIAGNOSIS — I70.0 AORTIC ATHEROSCLEROSIS: Chronic | ICD-10-CM

## 2024-10-23 DIAGNOSIS — E03.9 HYPOTHYROIDISM, UNSPECIFIED TYPE: Chronic | ICD-10-CM

## 2024-10-23 DIAGNOSIS — Z00.00 ENCOUNTER FOR PREVENTIVE HEALTH EXAMINATION: Primary | ICD-10-CM

## 2024-10-23 DIAGNOSIS — I10 ESSENTIAL HYPERTENSION: Chronic | ICD-10-CM

## 2024-10-23 PROCEDURE — 99999 PR PBB SHADOW E&M-EST. PATIENT-LVL IV: CPT | Mod: PBBFAC,,, | Performed by: NURSE PRACTITIONER

## 2024-10-23 PROCEDURE — 3288F FALL RISK ASSESSMENT DOCD: CPT | Mod: CPTII,S$GLB,, | Performed by: NURSE PRACTITIONER

## 2024-10-23 PROCEDURE — 1160F RVW MEDS BY RX/DR IN RCRD: CPT | Mod: CPTII,S$GLB,, | Performed by: NURSE PRACTITIONER

## 2024-10-23 PROCEDURE — 1101F PT FALLS ASSESS-DOCD LE1/YR: CPT | Mod: CPTII,S$GLB,, | Performed by: NURSE PRACTITIONER

## 2024-10-23 PROCEDURE — 4010F ACE/ARB THERAPY RXD/TAKEN: CPT | Mod: CPTII,S$GLB,, | Performed by: NURSE PRACTITIONER

## 2024-10-23 PROCEDURE — 3079F DIAST BP 80-89 MM HG: CPT | Mod: CPTII,S$GLB,, | Performed by: NURSE PRACTITIONER

## 2024-10-23 PROCEDURE — 1159F MED LIST DOCD IN RCRD: CPT | Mod: CPTII,S$GLB,, | Performed by: NURSE PRACTITIONER

## 2024-10-23 PROCEDURE — 1170F FXNL STATUS ASSESSED: CPT | Mod: CPTII,S$GLB,, | Performed by: NURSE PRACTITIONER

## 2024-10-23 PROCEDURE — 1123F ACP DISCUSS/DSCN MKR DOCD: CPT | Mod: CPTII,S$GLB,, | Performed by: NURSE PRACTITIONER

## 2024-10-23 PROCEDURE — G0439 PPPS, SUBSEQ VISIT: HCPCS | Mod: S$GLB,,, | Performed by: NURSE PRACTITIONER

## 2024-10-23 PROCEDURE — 1126F AMNT PAIN NOTED NONE PRSNT: CPT | Mod: CPTII,S$GLB,, | Performed by: NURSE PRACTITIONER

## 2024-10-23 PROCEDURE — 3074F SYST BP LT 130 MM HG: CPT | Mod: CPTII,S$GLB,, | Performed by: NURSE PRACTITIONER

## 2024-10-23 NOTE — PATIENT INSTRUCTIONS
Counseling and Referral of Other Preventative  (Italic type indicates deductible and co-insurance are waived)    Patient Name: Bhavani Hill  Today's Date: 10/23/2024    Health Maintenance       Date Due Completion Date    Colorectal Cancer Screening 06/02/2016 6/2/2015    DEXA Scan 12/17/2024 12/17/2021    Mammogram 04/03/2025 4/3/2024    Override on 7/28/2014: Done    Hemoglobin A1c (Diabetic Prevention Screening) 05/19/2025 5/19/2022    High Dose Statin 10/21/2025 10/21/2024    TETANUS VACCINE 11/21/2026 11/21/2016    Lipid Panel 10/10/2029 10/10/2024        No orders of the defined types were placed in this encounter.    The following information is provided to all patients.  This information is to help you find resources for any of the problems found today that may be affecting your health:                  Living healthy guide: www.[facility].[place].[facility]      Understanding Diabetes: www.diabetes.org      Eating healthy: www.cdc.gov/healthyweight      [facility] home safety checklist: www.cdc.gov/steadi/patient.html      Agency on Aging: www.goea.[place].[facility]      Alcoholics anonymous (AA): www.aa.org      Physical Activity: www.[place].nih.gov/ul9ujzn      Tobacco use: www.quitwithusla.org

## 2024-10-23 NOTE — PROGRESS NOTES
"Bhavani Hill presented for an initial Medicare AWV today. The following components were reviewed and updated:    Medical history  Family History  Social history  Allergies and Current Medications  Health Risk Assessment  Health Maintenance  Care Team    **See Completed Assessments for Annual Wellness visit with in the encounter summary    The following assessments were completed:  Depression Screening  Cognitive function Screening    Timed Get Up Test  Whisper Test    Opioid documentation:  Patient does not have a current opioid prescription.        Vitals:    10/23/24 1235   BP: 126/80   Pulse: 69   Temp: 99 °F (37.2 °C)   TempSrc: Oral   SpO2: 96%   Weight: 73.4 kg (161 lb 13.1 oz)   Height: 5' 4" (1.626 m)     Body mass index is 27.78 kg/m².     Physical Exam  Vitals reviewed.   Constitutional:       General: She is not in acute distress.  Pulmonary:      Effort: Pulmonary effort is normal. No respiratory distress.   Neurological:      Mental Status: She is alert and oriented to person, place, and time.   Psychiatric:         Mood and Affect: Mood normal.         Behavior: Behavior normal.         Thought Content: Thought content normal.         Judgment: Judgment normal.     Diagnoses and health risks identified today and associated recommendations/orders:  1. Encounter for preventive health examination  Reviewed and discussed health maintenance.    Colonoscopy scheduled 1/24/2025 with     2. Aortic atherosclerosis  Stable- continue current treatment and follow up routinely with PCP   Htn and hld controlled    3. Essential hypertension  Stable- continue current treatment and follow up routinely with PCP   Lisinopril/htcz 20/12.5mg daily and metoprolol 25mg daily  Vitals:    10/23/24 1235   BP: 126/80   Pulse: 69   Temp: 99 °F (37.2 °C)      4. Hypothyroidism, unspecified type  Stable- continue current treatment and follow up routinely with PCP   Synthroid 50mcg daily  Lab Results   Component Value Date "    TSH 2.312 10/10/2024      Provided Bhavani with a 5-10 year written screening schedule and personal prevention plan. Recommendations were developed using the USPSTF age appropriate recommendations. Education, counseling, and referrals were provided as needed.  After Visit Summary printed and given to patient which includes a list of additional screenings\tests needed.    I offered to discuss advanced care planning, including how to pick a person who would make decisions for you if you were unable to make them for yourself, called a health care power of , and what kind of decisions you might make such as use of life sustaining treatments such as ventilators and tube feeding when faced with a life limiting illness recorded on a living will that they will need to know. (How you want to be cared for as you near the end of your natural life)   X  Patient has advanced directives on file, which we reviewed, and they do not wish to make changes.  Trixie Fitzgerald, NP

## 2024-10-28 ENCOUNTER — INFUSION (OUTPATIENT)
Dept: INFUSION THERAPY | Facility: HOSPITAL | Age: 69
End: 2024-10-28
Attending: INTERNAL MEDICINE
Payer: MEDICARE

## 2024-10-28 VITALS
HEART RATE: 72 BPM | OXYGEN SATURATION: 99 % | HEIGHT: 64 IN | WEIGHT: 162.69 LBS | SYSTOLIC BLOOD PRESSURE: 163 MMHG | RESPIRATION RATE: 18 BRPM | DIASTOLIC BLOOD PRESSURE: 88 MMHG | BODY MASS INDEX: 27.77 KG/M2 | TEMPERATURE: 98 F

## 2024-10-28 DIAGNOSIS — D50.9 IRON DEFICIENCY ANEMIA, UNSPECIFIED IRON DEFICIENCY ANEMIA TYPE: Primary | ICD-10-CM

## 2024-10-28 PROCEDURE — 25000003 PHARM REV CODE 250: Mod: PN | Performed by: INTERNAL MEDICINE

## 2024-10-28 PROCEDURE — A4216 STERILE WATER/SALINE, 10 ML: HCPCS | Mod: PN | Performed by: INTERNAL MEDICINE

## 2024-10-28 PROCEDURE — 63600175 PHARM REV CODE 636 W HCPCS: Mod: JZ,JG,PN | Performed by: INTERNAL MEDICINE

## 2024-10-28 PROCEDURE — 96365 THER/PROPH/DIAG IV INF INIT: CPT | Mod: PN

## 2024-10-28 RX ORDER — SODIUM CHLORIDE 0.9 % (FLUSH) 0.9 %
10 SYRINGE (ML) INJECTION
Status: DISCONTINUED | OUTPATIENT
Start: 2024-10-28 | End: 2024-10-28 | Stop reason: HOSPADM

## 2024-10-28 RX ADMIN — FERUMOXYTOL 510 MG: 510 INJECTION INTRAVENOUS at 03:10

## 2024-10-28 RX ADMIN — SODIUM CHLORIDE: 9 INJECTION, SOLUTION INTRAVENOUS at 03:10

## 2024-10-28 RX ADMIN — Medication 10 ML: at 03:10

## 2024-10-30 ENCOUNTER — HOSPITAL ENCOUNTER (OUTPATIENT)
Dept: RADIOLOGY | Facility: HOSPITAL | Age: 69
Discharge: HOME | End: 2024-10-30
Attending: FAMILY MEDICINE
Payer: MEDICARE

## 2024-10-30 DIAGNOSIS — R91.1 SOLITARY PULMONARY NODULE: ICD-10-CM

## 2024-10-30 DIAGNOSIS — R91.1 LUNG NODULE: ICD-10-CM

## 2024-10-30 PROCEDURE — 71250 CT THORAX DX C-: CPT | Mod: 26,,, | Performed by: RADIOLOGY

## 2024-10-30 PROCEDURE — 71250 CT THORAX DX C-: CPT | Mod: TC,PO

## 2024-11-04 ENCOUNTER — TELEPHONE (OUTPATIENT)
Dept: PRIMARY CARE CLINIC | Facility: CLINIC | Age: 69
End: 2024-11-04
Payer: MEDICARE

## 2024-11-04 ENCOUNTER — INFUSION (OUTPATIENT)
Dept: INFUSION THERAPY | Facility: HOSPITAL | Age: 69
End: 2024-11-04
Attending: INTERNAL MEDICINE
Payer: MEDICARE

## 2024-11-04 VITALS
SYSTOLIC BLOOD PRESSURE: 138 MMHG | TEMPERATURE: 98 F | HEART RATE: 64 BPM | DIASTOLIC BLOOD PRESSURE: 74 MMHG | HEIGHT: 64 IN | WEIGHT: 162.69 LBS | BODY MASS INDEX: 27.77 KG/M2 | OXYGEN SATURATION: 100 % | RESPIRATION RATE: 16 BRPM

## 2024-11-04 DIAGNOSIS — D50.9 IRON DEFICIENCY ANEMIA, UNSPECIFIED IRON DEFICIENCY ANEMIA TYPE: Primary | ICD-10-CM

## 2024-11-04 PROCEDURE — 63600175 PHARM REV CODE 636 W HCPCS: Mod: JZ,JG,PN | Performed by: INTERNAL MEDICINE

## 2024-11-04 PROCEDURE — 96374 THER/PROPH/DIAG INJ IV PUSH: CPT | Mod: PN

## 2024-11-04 PROCEDURE — A4216 STERILE WATER/SALINE, 10 ML: HCPCS | Mod: PN | Performed by: INTERNAL MEDICINE

## 2024-11-04 PROCEDURE — 25000003 PHARM REV CODE 250: Mod: PN | Performed by: INTERNAL MEDICINE

## 2024-11-04 RX ORDER — SODIUM CHLORIDE 0.9 % (FLUSH) 0.9 %
10 SYRINGE (ML) INJECTION
Status: DISCONTINUED | OUTPATIENT
Start: 2024-11-04 | End: 2024-11-04 | Stop reason: HOSPADM

## 2024-11-04 RX ADMIN — FERUMOXYTOL 510 MG: 510 INJECTION INTRAVENOUS at 02:11

## 2024-11-04 RX ADMIN — SODIUM CHLORIDE: 9 INJECTION, SOLUTION INTRAVENOUS at 01:11

## 2024-11-04 RX ADMIN — Medication 10 ML: at 01:11

## 2024-11-04 NOTE — TELEPHONE ENCOUNTER
Please call regarding CT results.  We ordered a CT scan of her lungs to evaluate a nodule that was seen previously in the lower lung on an abdominal CT    The CT scan shows that that nodule has unchanged and does not look worrisome.  However it did  in additional nodule higher up, not previously within the window of vision on the abdominal CT.    Radiology recommended that if patient is low risk no follow-up is needed.  She does not have a history of smoking.  However we can still review and discuss this at her next follow-up and determine whether or not she would like to repeat a CT in a year as a precaution.

## 2024-11-04 NOTE — PLAN OF CARE
Pt arrived to clinic for Feraheme infusion and tolerated well with no changes throughout therapy, with being monitored 30 min post infusion. Pt aware of side effects and f/u appts and discharged to home in [facility].

## 2024-11-04 NOTE — PLAN OF CARE
Problem: Adult Inpatient Plan of Care  Goal: Plan of Care Review  Outcome: Progressing  Flowsheets (Taken 11/4/2024 1455)  Plan of Care Reviewed With: patient  Goal: Patient-Specific Goal (Individualized)  Outcome: Progressing  Flowsheets (Taken 11/4/2024 1455)  Individualized Care Needs: education, conversation, recliner  Anxieties, Fears or Concerns: generalized anxiety  Goal: Optimal Comfort and Wellbeing  Outcome: Progressing  Intervention: Provide Person-Centered Care  Flowsheets (Taken 11/4/2024 1455)  Trust Relationship/Rapport:   care explained   questions answered   choices provided   questions encouraged   emotional support provided   reassurance provided   empathic listening provided   thoughts/feelings acknowledged     Problem: Fatigue  Goal: Improved Activity Tolerance  Outcome: Progressing  Intervention: Promote Improved Energy  Flowsheets (Taken 11/4/2024 1455)  Fatigue Management:   paced activity encouraged   fatigue-related activity identified   frequent rest breaks encouraged  Sleep/Rest Enhancement:   therapeutic touch utilized   relaxation techniques promoted   natural light exposure provided   noise level reduced   family presence promoted  Activity Management:   Ambulated -L4   Up in chair - L3  Environmental Support:   rest periods encouraged   distractions minimized   calm environment promoted   personal routine supported

## 2024-12-18 ENCOUNTER — HOSPITAL ENCOUNTER (OUTPATIENT)
Dept: RADIOLOGY | Facility: HOSPITAL | Age: 69
Discharge: HOME | End: 2024-12-18
Attending: FAMILY MEDICINE
Payer: MEDICARE

## 2024-12-18 ENCOUNTER — TELEPHONE (OUTPATIENT)
Dept: PRIMARY CARE CLINIC | Facility: CLINIC | Age: 69
End: 2024-12-18
Payer: MEDICARE

## 2024-12-18 DIAGNOSIS — Z78.0 POST-MENOPAUSAL: ICD-10-CM

## 2024-12-18 PROCEDURE — 77080 DXA BONE DENSITY AXIAL: CPT | Mod: 26,,, | Performed by: RADIOLOGY

## 2024-12-18 PROCEDURE — 77080 DXA BONE DENSITY AXIAL: CPT | Mod: TC,PO

## 2024-12-18 PROCEDURE — 77092 TBS I&R FX RSK QHP: CPT | Mod: ,,, | Performed by: RADIOLOGY

## 2024-12-18 NOTE — TELEPHONE ENCOUNTER
----- Message from JUANITO Arango sent at 12/18/2024  2:08 PM CST -----  Please let patient know Bone density scan looks good.

## 2025-01-23 NOTE — H&P
COLONOSCOPY HISTORY AND PE    Procedure : Colonoscopy      INDICATIONS: asymptomatic screening exam      Past Medical History:   Diagnosis Date    Essential (primary) hypertension     since ~     Family history of breast cancer     Mother at age 58, Sister age 35    H/O bone density study 2015    R hip: 0.20   //   L hip:  0.02   //  spine 2.46    ([facility])     History of cholelithiasis     Hypothyroidism     since     Menopause     Pap smear for cervical cancer screening 2015    Pap/Hpv negative  ([facility])        Past Surgical History:   Procedure Laterality Date     SECTION      x 2    CHOLECYSTECTOMY      LAPAROTOMY, EXPLORATORY N/A 10/23/2023    Procedure: LAPAROTOMY, EXPLORATORY;  Surgeon: Gilberto Whiteside MD;  Location: [facility];  Service: OB/GYN;  Laterality: N/A;    SALPINGOOPHORECTOMY Bilateral 10/23/2023    Procedure: SALPINGO-OOPHORECTOMY;  Surgeon: Gilberto Whiteside MD;  [place] [facility] [place] [facility];  Service: OB/GYN;  Laterality: Bilateral;    TOTAL ABDOMINAL HYSTERECTOMY N/A 10/23/2023    Procedure: HYSTERECTOMY, TOTAL, ABDOMINAL;  Surgeon: Gilberto Whiteside MD;  [place] [facility] [place] [facility] [place];  Service: OB/GYN;  Laterality: N/A;  PROCEED AS INDICATED       Review of patient's allergies indicates:   Allergen Reactions    Trazodone Other (See Comments)     Hyperactivity, personality/mood change    Guaifenesin Other (See Comments)     Eyes and nose water       No current facility-administered medications on file prior to encounter.     Current Outpatient Medications on File Prior to Encounter   Medication Sig Dispense Refill    ascorbic acid, vitamin C, (VITAMIN C) 1000 MG tablet Take 1,000 mg by mouth once daily.      cyanocobalamin (VITAMIN B-12) 1000 MCG tablet Take 3,000 mcg by mouth every other day.      docusate sodium (COLACE) 100 MG capsule Take 100 mg by mouth once daily.      levothyroxine (SYNTHROID) 50 MCG tablet Take 1 tablet by mouth once daily 90 tablet 3    lisinopriL-hydrochlorothiazide  (PRINZIDE,ZESTORETIC) 20-12.5 mg per tablet Take 2 tablets by mouth once daily 180 tablet 3    metoprolol succinate (TOPROL-XL) 25 MG 24 hr tablet Take 1 tablet by mouth once daily 90 tablet 3    rosuvastatin (CRESTOR) 10 MG tablet Take 1 tablet (10 mg total) by mouth once daily. 90 tablet 3    hydrocortisone (ANUSOL-HC) 2.5 % rectal cream Place rectally 2 (two) times daily. 28 g 0    inulin (FIBER GUMMIES) 2 gram Chew Take 1 Piece by mouth once daily.         Family History   Problem Relation Name Age of Onset    Breast cancer Mother Inez Torres 58    Cancer Mother Inez Torres         Breast    Breast cancer Sister Toya     Cancer Sister Bryanna Muniz Garcia         Breast and ? biliary CA    Colon cancer Neg Hx      Ovarian cancer Neg Hx         Social History     Socioeconomic History    Marital status:      Spouse name: Eliel   Occupational History    Occupation: teacher   Tobacco Use    Smoking status: Never    Smokeless tobacco: Never   Substance and Sexual Activity    Alcohol use: Yes     Alcohol/week: 0.0 standard drinks of alcohol     Comment: 1 martini daily    Drug use: Never    Sexual activity: Yes     Birth control/protection: Post-menopausal     Social Drivers of Health     Financial Resource Strain: Low Risk  (10/16/2024)    Overall Financial Resource Strain (CARDIA)     Difficulty of Paying Living Expenses: Not hard at all   Food Insecurity: No Food Insecurity (10/16/2024)    Hunger Vital Sign     Worried About Running Out of Food in the Last Year: Never true     Ran Out of Food in the Last Year: Never true   Transportation Needs: Unknown (10/16/2024)    PRAPARE - Transportation     Lack of Transportation (Medical): No   Physical Activity: Insufficiently Active (10/16/2024)    Exercise Vital Sign     Days of Exercise per Week: 3 days     Minutes of Exercise per Session: 20 min   Stress: Stress Concern Present (10/16/2024)    [employer] - Occupational Stress  Questionnaire     Feeling of Stress : To some extent   Housing Stability: Unknown (10/16/2024)    Housing Stability Vital Sign     Unable to Pay for Housing in the Last Year: No       Review of Systems -    Respiratory : no cough, shortness of breath, or wheezing  Cardiovascular  no chest pain or dyspnea on exertion  Gastrointestinal no abdominal pain, change in bowel habits, or black or bloody stools  Musculoskeletal no deformities, swelling  Neurological no TIA or stroke symptoms        Physical Exam:  General: NAD  AT NC EOMI  Mallampati Score   Neck supple, trachea midline  Lungs: nl excursions, no retractions.  Breathing comfortably  Abdomen ND soft NT.  No masses  Extremities: No CCE.      ASA:  II    PLAN  COLONOSCOPY.  The details of the procedure, the possible need for biopsy or polypectomy and the potential risks including bleeding, perforation, missed polyps were discussed in detail.

## 2025-01-24 ENCOUNTER — HOSPITAL ENCOUNTER (OUTPATIENT)
Facility: HOSPITAL | Age: 70
Discharge: HOME | End: 2025-01-24
Attending: COLON & RECTAL SURGERY | Admitting: COLON & RECTAL SURGERY
Payer: MEDICARE

## 2025-01-24 ENCOUNTER — ANESTHESIA (OUTPATIENT)
Dept: ENDOSCOPY | Facility: HOSPITAL | Age: 70
End: 2025-01-24
Payer: MEDICARE

## 2025-01-24 ENCOUNTER — ANESTHESIA EVENT (OUTPATIENT)
Dept: ENDOSCOPY | Facility: HOSPITAL | Age: 70
End: 2025-01-24
Payer: MEDICARE

## 2025-01-24 DIAGNOSIS — Z12.11 SCREEN FOR COLON CANCER: ICD-10-CM

## 2025-01-24 PROCEDURE — 63600175 PHARM REV CODE 636 W HCPCS: Mod: PO | Performed by: COLON & RECTAL SURGERY

## 2025-01-24 PROCEDURE — D9220A PRA ANESTHESIA: Mod: PT,ANES,, | Performed by: ANESTHESIOLOGY

## 2025-01-24 PROCEDURE — 88305 TISSUE EXAM BY PATHOLOGIST: CPT | Mod: 59,PO | Performed by: STUDENT IN AN ORGANIZED HEALTH CARE EDUCATION/TRAINING PROGRAM

## 2025-01-24 PROCEDURE — 27201089 HC SNARE, DISP (ANY): Mod: PO | Performed by: COLON & RECTAL SURGERY

## 2025-01-24 PROCEDURE — 45385 COLONOSCOPY W/LESION REMOVAL: CPT | Mod: PT,,, | Performed by: COLON & RECTAL SURGERY

## 2025-01-24 PROCEDURE — 88305 TISSUE EXAM BY PATHOLOGIST: CPT | Mod: 26,,, | Performed by: STUDENT IN AN ORGANIZED HEALTH CARE EDUCATION/TRAINING PROGRAM

## 2025-01-24 PROCEDURE — 37000008 HC ANESTHESIA 1ST 15 MINUTES: Mod: PO | Performed by: COLON & RECTAL SURGERY

## 2025-01-24 PROCEDURE — 63600175 PHARM REV CODE 636 W HCPCS: Mod: PO | Performed by: NURSE ANESTHETIST, CERTIFIED REGISTERED

## 2025-01-24 PROCEDURE — D9220A PRA ANESTHESIA: Mod: PT,CRNA,, | Performed by: NURSE ANESTHETIST, CERTIFIED REGISTERED

## 2025-01-24 PROCEDURE — 37000009 HC ANESTHESIA EA ADD 15 MINS: Mod: PO | Performed by: COLON & RECTAL SURGERY

## 2025-01-24 PROCEDURE — 45385 COLONOSCOPY W/LESION REMOVAL: CPT | Mod: PT,PO | Performed by: COLON & RECTAL SURGERY

## 2025-01-24 RX ORDER — LIDOCAINE HYDROCHLORIDE 20 MG/ML
INJECTION INTRAVENOUS
Status: DISCONTINUED | OUTPATIENT
Start: 2025-01-24 | End: 2025-01-24

## 2025-01-24 RX ORDER — SODIUM CHLORIDE 0.9 % (FLUSH) 0.9 %
10 SYRINGE (ML) INJECTION
Status: DISCONTINUED | OUTPATIENT
Start: 2025-01-24 | End: 2025-01-24 | Stop reason: HOSPADM

## 2025-01-24 RX ORDER — SODIUM CHLORIDE, SODIUM LACTATE, POTASSIUM CHLORIDE, CALCIUM CHLORIDE 600; 310; 30; 20 MG/100ML; MG/100ML; MG/100ML; MG/100ML
INJECTION, SOLUTION INTRAVENOUS CONTINUOUS
Status: DISCONTINUED | OUTPATIENT
Start: 2025-01-24 | End: 2025-01-24 | Stop reason: HOSPADM

## 2025-01-24 RX ORDER — PROPOFOL 10 MG/ML
VIAL (ML) INTRAVENOUS
Status: DISCONTINUED | OUTPATIENT
Start: 2025-01-24 | End: 2025-01-24

## 2025-01-24 RX ADMIN — PROPOFOL 50 MG: 10 INJECTION, EMULSION INTRAVENOUS at 09:01

## 2025-01-24 RX ADMIN — LIDOCAINE HYDROCHLORIDE 100 MG: 20 INJECTION INTRAVENOUS at 09:01

## 2025-01-24 RX ADMIN — PROPOFOL 50 MG: 10 INJECTION, EMULSION INTRAVENOUS at 10:01

## 2025-01-24 RX ADMIN — PROPOFOL 100 MG: 10 INJECTION, EMULSION INTRAVENOUS at 09:01

## 2025-01-24 RX ADMIN — SODIUM CHLORIDE, POTASSIUM CHLORIDE, SODIUM LACTATE AND CALCIUM CHLORIDE: 600; 310; 30; 20 INJECTION, SOLUTION INTRAVENOUS at 09:01

## 2025-01-24 NOTE — PROVATION PATIENT INSTRUCTIONS
Discharge Summary/Instructions after an Endoscopic Procedure  Patient Name: Bhavani Hill  Patient MRN: [id number]  Patient YOB: 1955 Friday, January 24, 2025  Kunal Quintero MD  Dear patient,  As a result of recent federal legislation (The Federal Cures Act), you may   receive lab or pathology results from your procedure in your [facility]   account before your physician is able to contact you. Your physician or   their representative will relay the results to you with their   recommendations at their soonest availability.  Thank you,  RESTRICTIONS:  During your procedure today, you received medications for sedation.  These   medications may affect your judgment, balance and coordination.  Therefore,   for 24 hours, you have the following restrictions:   - DO NOT drive a car, operate machinery, make legal/financial decisions,   sign important papers or drink alcohol.    ACTIVITY:  Today: no heavy lifting, straining or running due to procedural   sedation/anesthesia.  The following day: return to full activity including work.  DIET:  Eat and drink normally unless instructed otherwise.     TREATMENT FOR COMMON SIDE EFFECTS:  - Mild abdominal pain, nausea, belching, bloating or excessive gas:  rest,   eat lightly and use a heating pad.  - Sore Throat: treat with throat lozenges and/or gargle with warm salt   water.  - Because air was used during the procedure, expelling large amounts of air   from your rectum or belching is normal.  - If a bowel prep was taken, you may not have a bowel movement for 1-3 days.    This is normal.  SYMPTOMS TO WATCH FOR AND REPORT TO YOUR PHYSICIAN:  1. Abdominal pain or bloating, other than gas cramps.  2. Chest pain.  3. Back pain.  4. Signs of infection such as: chills or fever occurring within 24 hours   after the procedure.  5. Rectal bleeding, which would show as bright red, maroon, or black stools.   (A tablespoon of blood from the rectum is not serious, especially if    hemorrhoids are present.)  6. Vomiting.  7. Weakness or dizziness.  GO DIRECTLY TO THE NEAREST EMERGENCY ROOM IF YOU HAVE ANY OF THE FOLLOWING:      Difficulty breathing              Chills and/or fever over 101 F   Persistent vomiting and/or vomiting blood   Severe abdominal pain   Severe chest pain   Black, tarry stools   Bleeding- more than one tablespoon   Any other symptom or condition that you feel may need urgent attention  Your doctor recommends these additional instructions:  If any biopsies were taken, your doctors clinic will contact you in 1 to 2   weeks with any results.  You are being discharged to home.   You have a contact number available for emergencies.  The signs and symptoms   of potential delayed complications were discussed with you.  You may return   to normal activities tomorrow.  Written discharge instructions were   provided to you.   Resume your previous diet.   Continue your present medications.   We are waiting for your pathology results.   Your physician has recommended a repeat colonoscopy in five years for   surveillance based on pathology results.   Return to my office in four weeks.  For questions, problems or results please call your physician - Kunal Quintero MD at Work:  [phone].  EMERGENCY PHONE NUMBER: [phone], LAB RESULTS: [phone]  IF A COMPLICATION OR EMERGENCY SITUATION ARISES AND YOU ARE UNABLE TO REACH   YOUR PHYSICIAN - GO DIRECTLY TO THE EMERGENCY ROOM.  ___________________________________________  Nurse Signature  ___________________________________________  Patient/Designated Responsible Party Signature  Kunal Quintero MD  1/24/2025 10:23:53 AM  This report has been verified and signed electronically.  Dear patient,  As a result of recent federal legislation (The Federal Cures Act), you may   receive lab or pathology results from your procedure in your [facility]   account before your physician is able to contact you. Your physician or   their  representative will relay the results to you with their   recommendations at their soonest availability.  Thank you.  PROVATION

## 2025-01-24 NOTE — TRANSFER OF CARE
"Anesthesia Transfer of Care Note    Patient: Bhavani Hill    Procedure(s) Performed: Procedure(s) (LRB):  COLONOSCOPY, SCREENING, LOW RISK PATIENT (N/A)    Patient location: PACU    Anesthesia Type: general    Transport from OR: Transported from OR on room air with adequate spontaneous ventilation    Post pain: adequate analgesia    Post assessment: no apparent anesthetic complications and tolerated procedure well    Post vital signs: stable    Level of consciousness: awake and alert    Nausea/Vomiting: no nausea/vomiting    Complications: none    Transfer of care protocol was followed      Last vitals: Visit Vitals  BP (!) 182/86 (BP Location: Right arm, Patient Position: Lying)   Pulse 61   Temp 36.3 °C (97.4 °F)   Resp 16   Ht 5' 4" (1.626 m)   Wt 72.1 kg (159 lb)   SpO2 97%   Breastfeeding No   BMI 27.29 kg/m²     "

## 2025-01-24 NOTE — ANESTHESIA PREPROCEDURE EVALUATION
01/24/2025  Bhavani Hill is a 69 y.o., female.      Pre-op Assessment    I have reviewed the Patient Summary Reports.     I have reviewed the Nursing Notes. I have reviewed the NPO Status.   I have reviewed the Medications.     Review of Systems  Anesthesia Hx:             Denies Family Hx of Anesthesia complications.    Denies Personal Hx of Anesthesia complications.                    Hematology/Oncology:       -- Anemia:                                  Cardiovascular:     Hypertension               Ao ASCVD                           Endocrine:   Hypothyroidism        Obesity / BMI > 30  Psych:   anxiety                 Physical Exam  General: Cooperative, Alert and Oriented    Airway:  Mallampati: III / II        Anesthesia Plan  Type of Anesthesia, risks & benefits discussed:    Anesthesia Type: Gen Natural Airway  Intra-op Monitoring Plan: Standard ASA Monitors  Induction:  IV  Informed Consent: Informed consent signed with the Patient and all parties understand the risks and agree with anesthesia plan.  All questions answered.   ASA Score: 2    Ready For Surgery From Anesthesia Perspective.     .       [Follow-up Visit ___] : a follow-up visit  for [unfilled]

## 2025-01-24 NOTE — ANESTHESIA POSTPROCEDURE EVALUATION
Anesthesia Post Evaluation    Patient: Bhavani Hill    Procedure(s) Performed: Procedure(s) (LRB):  COLONOSCOPY, SCREENING, LOW RISK PATIENT (N/A)    Final Anesthesia Type: general      Patient location during evaluation: PACU  Patient participation: Yes- Able to Participate  Level of consciousness: awake and alert  Post-procedure vital signs: reviewed and stable  Pain management: adequate  Airway patency: patent    PONV status at discharge: No PONV  Anesthetic complications: no      Cardiovascular status: blood pressure returned to baseline  Respiratory status: unassisted  Hydration status: euvolemic  Follow-up not needed.              Vitals Value Taken Time   /83 01/24/25 1050   Temp 36.3 °C (97.4 °F) 01/24/25 1021   Pulse 66 01/24/25 1050   Resp 15 01/24/25 1050   SpO2 98 % 01/24/25 1050         Event Time   Out of Recovery 10:52:00         Pain/Joyce Score: Joyce Score: 10 (1/24/2025 10:39 AM)

## 2025-01-25 VITALS
HEART RATE: 66 BPM | DIASTOLIC BLOOD PRESSURE: 83 MMHG | HEIGHT: 64 IN | SYSTOLIC BLOOD PRESSURE: 155 MMHG | WEIGHT: 159 LBS | TEMPERATURE: 97 F | OXYGEN SATURATION: 98 % | RESPIRATION RATE: 15 BRPM | BODY MASS INDEX: 27.14 KG/M2

## 2025-01-27 LAB
FINAL PATHOLOGIC DIAGNOSIS: NORMAL
GROSS: NORMAL
Lab: NORMAL
MICROSCOPIC EXAM: NORMAL

## 2025-01-30 DIAGNOSIS — Z00.00 ENCOUNTER FOR MEDICARE ANNUAL WELLNESS EXAM: ICD-10-CM

## 2025-02-13 DIAGNOSIS — I70.0 AORTIC ATHEROSCLEROSIS: ICD-10-CM

## 2025-02-13 RX ORDER — ROSUVASTATIN CALCIUM 10 MG/1
10 TABLET, COATED ORAL
Qty: 90 TABLET | Refills: 1 | Status: SHIPPED | OUTPATIENT
Start: 2025-02-13

## 2025-02-17 ENCOUNTER — TELEPHONE (OUTPATIENT)
Dept: SURGERY | Facility: CLINIC | Age: 70
End: 2025-02-17
Payer: MEDICARE

## 2025-02-17 ENCOUNTER — LAB VISIT (OUTPATIENT)
Dept: LAB | Facility: HOSPITAL | Age: 70
End: 2025-02-17
Payer: MEDICARE

## 2025-02-17 DIAGNOSIS — D50.9 IRON DEFICIENCY ANEMIA, UNSPECIFIED IRON DEFICIENCY ANEMIA TYPE: ICD-10-CM

## 2025-02-17 LAB
BASOPHILS # BLD AUTO: 0.05 K/UL (ref 0–0.2)
BASOPHILS NFR BLD: 0.6 % (ref 0–1.9)
DIFFERENTIAL METHOD BLD: ABNORMAL
EOSINOPHIL # BLD AUTO: 0.1 K/UL (ref 0–0.5)
EOSINOPHIL NFR BLD: 1.6 % (ref 0–8)
ERYTHROCYTE [DISTWIDTH] IN BLOOD BY AUTOMATED COUNT: 13.5 % (ref 11.5–14.5)
HCT VFR BLD AUTO: 36.4 % (ref 37–48.5)
HGB BLD-MCNC: 12.5 G/DL (ref 12–16)
IMM GRANULOCYTES # BLD AUTO: 0.02 K/UL (ref 0–0.04)
IMM GRANULOCYTES NFR BLD AUTO: 0.2 % (ref 0–0.5)
LYMPHOCYTES # BLD AUTO: 1.7 K/UL (ref 1–4.8)
LYMPHOCYTES NFR BLD: 20.8 % (ref 18–48)
MCH RBC QN AUTO: 29.6 PG (ref 27–31)
MCHC RBC AUTO-ENTMCNC: 34.3 G/DL (ref 32–36)
MCV RBC AUTO: 86 FL (ref 82–98)
MONOCYTES # BLD AUTO: 0.7 K/UL (ref 0.3–1)
MONOCYTES NFR BLD: 7.8 % (ref 4–15)
NEUTROPHILS # BLD AUTO: 5.8 K/UL (ref 1.8–7.7)
NEUTROPHILS NFR BLD: 69 % (ref 38–73)
NRBC BLD-RTO: 0 /100 WBC
PLATELET # BLD AUTO: 223 K/UL (ref 150–450)
PMV BLD AUTO: 11.2 FL (ref 9.2–12.9)
RBC # BLD AUTO: 4.22 M/UL (ref 4–5.4)
WBC # BLD AUTO: 8.38 K/UL (ref 3.9–12.7)

## 2025-02-17 PROCEDURE — 83540 ASSAY OF IRON: CPT | Performed by: INTERNAL MEDICINE

## 2025-02-17 PROCEDURE — 84238 ASSAY NONENDOCRINE RECEPTOR: CPT | Performed by: INTERNAL MEDICINE

## 2025-02-17 PROCEDURE — 85025 COMPLETE CBC W/AUTO DIFF WBC: CPT | Mod: PN | Performed by: INTERNAL MEDICINE

## 2025-02-17 PROCEDURE — 82728 ASSAY OF FERRITIN: CPT | Performed by: INTERNAL MEDICINE

## 2025-02-17 NOTE — TELEPHONE ENCOUNTER
RN returned patient's call -- pt states she was told to follow up in clinic per Dr Quintero. Assisted pt in scheduling appointment. Pt voiced understanding of appointment time and location. Answered questions to patient's satisfaction. Advised pt to call the clinic with additional questions or concerns.    ----- Message from Patti sent at 2/17/2025  4:04 PM CST -----  Type: Needs Medical AdviceWho Called:  PTBest Call Back Number: [phone] Additional Information: requesting a call back to get colonoscopy F/u appt from 1/24

## 2025-02-18 LAB
FERRITIN SERPL-MCNC: 69 NG/ML (ref 20–300)
IRON SERPL-MCNC: 74 UG/DL (ref 30–160)
SATURATED IRON: 21 % (ref 20–50)
TOTAL IRON BINDING CAPACITY: 360 UG/DL (ref 250–450)
TRANSFERRIN SERPL-MCNC: 243 MG/DL (ref 200–375)

## 2025-02-18 NOTE — PROGRESS NOTES
"PATIENT: Bhavani Hill  MRN: [id number]  DATE: 2/19/2025      Diagnosis:   1. Iron deficiency anemia, unspecified iron deficiency anemia type    2. Bleeding hemorrhoid        Chief Complaint: Follow-up ( 4mths f/u with labs 2/17/)          Subjective:    Interval History: Ms. Hill is a 69 y.o. female who returns for follow up. She reports feeling well overall. Has some fatigue, but improved recently. Notes scant bleeding when wiping. Denies cp, sob, palpitations.     Her most recent labs are from 2/17/25 and they are as follows:  Ferritin was 69 ng/mL  WBCs were 8,380 per cubic mm, hemoglobin 12.5 grams/deciliter, hematocrit 36.4%, MCV 86, and platelets 223 K.  TIBC 360    Prior History:   Per Record:  Mrs Hill is a 68-year-old female referred for iron deficiency anemia.  The most recent labs are from 08/07/2023 and they are as follows:  Ferritin was 14 ng/mL  WBCs were 4,800 per cubic mm, hemoglobin 11.6 grams/deciliter, hematocrit 36.1%, MCV 79, and platelets 223 K.  A CBC on 06/05/2023 showed a white count of 5300 per cubic mm, hemoglobin 10.6 grams/deciliter, hematocrit 34.2%, MCV 82 and platelets 240 K.  On the same day the ferritin was 9 ng/mL, while 3 stool samples that she submitted were negative for occult blood  A CBC on 05/04/2023 had shown a white count of 5400 per cubic mm, hemoglobin 11.6 grams/deciliter, hematocrit 36.1%, MCV 89 and platelets 197 K.  A CBC on May 19, 2022 had shown a white count of 4500 per cubic mm, hemoglobin 12.7 grams/deciliter, hematocrit 39.9%, MCV 85 and platelets 234 K.  She is referred for evaluation.  Other pertinent labs include a ferritin of 10 ng/mL.  On April 3, 2023  Three stool samples she has submitted in mid June were negative for occult blood.     She states that she was prescribed an oral formulation of iron in early June which she did not tolerate well, and she was switched to a different formulation that she can tolerate better."       -reports ongoing hemorrhoidal " bleeding, + Tried oral iron which caused GI side effects, constipation + hemorrhoids.   -reports she her daughter got  2 weeks ago and she's put off getting her colonoscopy because of wedding planning     Oncology History    No history exists.       Past Medical History:   Past Medical History:   Diagnosis Date    Essential (primary) hypertension     since ~     Family history of breast cancer     Mother at age 58, Sister age 35    H/O bone density study 2015    R hip: 0.20   //   L hip:  0.02   //  sp[place] 2.46    [place] [place]     History of cholelithiasis     Hypothyroidism     since     Menopause     Pap smear for cervical cancer screening 2015    Pap/Hpv negative  [place] [place] [facility] [place]        Past Surgical HIstory:   Past Surgical History:   Procedure Laterality Date     SECTION      x 2    CHOLECYSTECTOMY      COLONOSCOPY, SCREENING, LOW RISK PATIENT N/A 2025    Procedure: COLONOSCOPY, SCREENING, LOW RISK PATIENT;  Surgeon: PHILL Quintero MD;  Location: [facility];  Service: Endoscopy;  Laterality: N/A;    LAPAROTOMY, EXPLORATORY N/A 10/23/2023    Procedure: LAPAROTOMY, EXPLORATORY;  Surgeon: Gilberto Whiteside MD;  Location: [facility];  Service: OB/GYN;  Laterality: N/A;    SALPINGOOPHORECTOMY Bilateral 10/23/2023    Procedure: SALPINGO-OOPHORECTOMY;  Surgeon: Gilberto Whiteside MD;  Location: [facility] OR;  Service: OB/GYN;  Laterality: Bilateral;    TOTAL ABDOMINAL HYSTERECTOMY N/A 10/23/2023    Procedure: HYSTERECTOMY, TOTAL, ABDOMINAL;  Surgeon: Gilberto Whiteside MD;  Location: [facility];  Service: OB/GYN;  Laterality: N/A;  PROCEED AS INDICATED       Family History:   Family History   Problem Relation Name Age of Onset    Breast cancer Mother Inez Torres 58    Cancer Mother Inez Torres         Breast    Breast cancer Sister Toya     Cancer Sister Bryanna  Muniz Garcia         Breast and ? biliary CA    Colon cancer Neg Hx      Ovarian cancer Neg Hx         Social History:  reports that she has  "never smoked. She has never used smokeless tobacco. She reports current alcohol use. She reports that she does not use drugs.    Allergies:  Review of patient's allergies indicates:   Allergen Reactions    Trazodone Other (See Comments)     Hyperactivity, personality/mood change    Guaifenesin Other (See Comments)     Eyes and nose water       Medications:  Current Medications[1]    Review of Systems   Constitutional:  Negative for appetite change and unexpected weight change.   HENT:  Negative for mouth sores.    Eyes:  Negative for visual disturbance.   Respiratory:  Negative for cough and shortness of breath.    Cardiovascular:  Negative for chest pain.   Gastrointestinal:  Negative for abdominal pain and diarrhea.   Genitourinary:  Negative for frequency.   Musculoskeletal:  Negative for back pain.   Skin:  Negative for rash.   Neurological:  Negative for headaches.   Hematological:  Negative for adenopathy.   Psychiatric/Behavioral:  The patient is not nervous/anxious.        ECOG Performance Status:   ECOG SCORE             Objective:      Vitals:   Vitals:    02/19/25 1132   BP: (!) 165/91   BP Location: Left arm   Patient Position: Sitting   Pulse: 76   Resp: 18   Temp: 98.2 °F (36.8 °C)   TempSrc: Temporal   SpO2: 100%   Weight: 76.7 kg (169 lb 1.5 oz)   Height: 5' 4" (1.626 m)     BMI: Body mass index is 29.02 kg/m².    Physical Exam  HENT:      Head: Normocephalic.      Nose: Nose normal.      Mouth/Throat:      Mouth: Mucous membranes are moist.      Pharynx: Oropharynx is clear.   Cardiovascular:      Rate and Rhythm: Normal rate and regular rhythm.      Heart sounds: Normal heart sounds.   Pulmonary:      Effort: Pulmonary effort is normal.      Breath sounds: Normal breath sounds.   Musculoskeletal:         General: Normal range of motion.   Skin:     General: Skin is warm and dry.   Neurological:      Mental Status: She is alert and oriented to person, place, and time.   Psychiatric:         Mood and " Affect: Mood normal.         Behavior: Behavior normal.         Laboratory Data:  No visits with results within 1 Day(s) from this visit.   Latest known visit with results is:   Lab Visit on 02/17/2025   Component Date Value Ref Range Status    Ferritin 02/17/2025 69  20.0 - 300.0 ng/mL Final    Iron 02/17/2025 74  30 - 160 ug/dL Final    Transferrin 02/17/2025 243  200 - 375 mg/dL Final    TIBC 02/17/2025 360  250 - 450 ug/dL Final    Saturated Iron 02/17/2025 21  20 - 50 % Final    WBC 02/17/2025 8.38  3.90 - 12.70 K/uL Final    RBC 02/17/2025 4.22  4.00 - 5.40 M/uL Final    Hemoglobin 02/17/2025 12.5  12.0 - 16.0 g/dL Final    Hematocrit 02/17/2025 36.4 (L)  37.0 - 48.5 % Final    MCV 02/17/2025 86  82 - 98 fL Final    MCH 02/17/2025 29.6  27.0 - 31.0 pg Final    MCHC 02/17/2025 34.3  32.0 - 36.0 g/dL Final    RDW 02/17/2025 13.5  11.5 - 14.5 % Final    Platelets 02/17/2025 223  150 - 450 K/uL Final    MPV 02/17/2025 11.2  9.2 - 12.9 fL Final    Immature Granulocytes 02/17/2025 0.2  0.0 - 0.5 % Final    Gran # (ANC) 02/17/2025 5.8  1.8 - 7.7 K/uL Final    Immature Grans (Abs) 02/17/2025 0.02  0.00 - 0.04 K/uL Final    Lymph # 02/17/2025 1.7  1.0 - 4.8 K/uL Final    Mono # 02/17/2025 0.7  0.3 - 1.0 K/uL Final    Eos # 02/17/2025 0.1  0.0 - 0.5 K/uL Final    Baso # 02/17/2025 0.05  0.00 - 0.20 K/uL Final    nRBC 02/17/2025 0  0 /100 WBC Final    Gran % 02/17/2025 69.0  38.0 - 73.0 % Final    Lymph % 02/17/2025 20.8  18.0 - 48.0 % Final    Mono % 02/17/2025 7.8  4.0 - 15.0 % Final    Eosinophil % 02/17/2025 1.6  0.0 - 8.0 % Final    Basophil % 02/17/2025 0.6  0.0 - 1.9 % Final    Differential Method 02/17/2025 Automated   Final      Colonoscopy 1/24/25  Impression:            - Diverticulosis in the entire examined colon.                          - One 5 mm polyp at the ileocecal valve, removed                          with a cold snare. Resected and retrieved.                          - One 4 mm polyp in the  sigmoid colon, removed                          with a cold snare. Resected and retrieved.                          - The examined portion of the ileum was normal.                          - Non-bleeding internal hemorrhoids. No specimens                          collected.   Pathology:  1. Ileocecal valve, polypectomy:   - Tubular adenoma   - Negative for high-grade dysplasia or malignancy     2. Colon, sigmoid, polypectomy:   - Tubular adenoma   - Negative for high-grade dysplasia or malignancy     Imaging: DXA BONE DENSITY AXIAL SKELETON 1 OR MORE SITES W/TBS (XPD)     CLINICAL HISTORY:  Asymptomatic menopausal state     TECHNIQUE:  DXA scanning was performed over the left hip and lumbar spine.  Review of the images confirms satisfactory positioning and technique.  Trabecular bone score (TBS)  analysis was also performed over the lumbar spine at L1-L4.     COMPARISON:  December 17, 2021     FINDINGS:  DEXA:     The L1-L4 vertebral bone mineral density is equal to 1.409 g/cm squared with a T score of 3.3.  There has been no significant change relative to the prior study.     The left femoral neck bone mineral density is equal to 0.961 g/cm squared with a T score of 1.0.  There has been a 5.1% statistically significant change relative to the prior study.     There is a 5.9% risk of a major osteoporotic fracture and a 0.2% risk of hip fracture in the next 10 years (TBS adjusted FRAX score).     Trabecular Bone Score:     The lumbar spine trabecular bone score (TBS L1-L4) is 1.468.  This is consistent with normal microarchitecture.     The lumbar spine T-score adjusted for TBS is 4.3.     The femoral neck T-score adjusted for TBS is 1.5.     The bone resilience index is normal compared to the reference population.     Impression:     No evidence of significant bone density loss     Consider FDA approved medical therapies in postmenopausal women and men aged 50 years and older, based on the following:     *A hip or  vertebral (clinical or morphometric) fracture  *T score less than or equal to -2.5 at the femoral neck or spine after appropriate evaluation to exclude secondary causes.  *Low bone mass -- also known as osteopenia (T score between -1.0 and -2.5 at the femoral neck or spine) and a 10 year probability of hip fracture greater than or equal to 3% or a 10 year probability of major osteoporosis-related fracture greater than or equal to 20% based on the [place]-adapted WHO algorithm.  *Clinicians judgment and/or patient preference may indicate treatment for people with 10 year fracture probabilities is above or below these levels.        Electronically signed by:Vincent Rivas MD  Date:                                            12/18/2024   Assessment:       1. Iron deficiency anemia, unspecified iron deficiency anemia type    2. Bleeding hemorrhoid           Plan:      Iron-deficiency anemia  -Colonoscopy 1/24/25 revealed non-bleeding hemorrhoids. EGD pending. Following with Dr Quintero.  -her cytopenias formerly resolved with oral iron but she's had ongoing hemorrhoidal bleeding, she reports she will not take oral iron in the future could because she had abundant GI side effects  -S/P 2 doses of feraheme to avoid worsening constipation issues   -iron levels now WNL. CBC stable.   -Follow up 3 mos with repeat labs     -moving forward can follow exclusively with [facility] for ongoing iron deficiency, will need iron as long as she's having hemorrhoidal bleeding issues, once this is resolved, she can be discharged from hematology clinic.            Med Onc Chart Routing      Follow up with physician    Follow up with [facility] 3 months. with cbc, cmp, iron studies, ferritin at least 1 day prior to visit.   Infusion scheduling note    Injection scheduling note    Labs    Imaging    Pharmacy appointment    Other referrals                  Plan was discussed with the patient at length, and she verbalized understanding. Bhavani was given an  opportunity to ask questions that were answered to her satisfaction, and she was advised to call in the interval if any problems or questions arise.    Assessment/Plan reviewed and approved by Dr Perez    36 minutes were spent in coordination of patient's care, record review and counseling.    QUINTIN Brink, FNP-C  Hematology & Oncology         [1]   Current Outpatient Medications   Medication Sig Dispense Refill    ascorbic acid, vitamin C, (VITAMIN C) 1000 MG tablet Take 1,000 mg by mouth once daily.      cyanocobalamin (VITAMIN B-12) 1000 MCG tablet Take 3,000 mcg by mouth every other day.      docusate sodium (COLACE) 100 MG capsule Take 100 mg by mouth once daily.      hydrocortisone (ANUSOL-HC) 2.5 % rectal cream Place rectally 2 (two) times daily. 28 g 0    inulin (FIBER GUMMIES) 2 gram Chew Take 1 Piece by mouth once daily.      levothyroxine (SYNTHROID) 50 MCG tablet Take 1 tablet by mouth once daily 90 tablet 3    lisinopriL-hydrochlorothiazide (PRINZIDE,ZESTORETIC) 20-12.5 mg per tablet Take 2 tablets by mouth once daily 180 tablet 3    metoprolol succinate (TOPROL-XL) 25 MG 24 hr tablet Take 1 tablet by mouth once daily 90 tablet 3    rosuvastatin (CRESTOR) 10 MG tablet Take 1 tablet by mouth once daily 90 tablet 1    ALPRAZolam (XANAX) 0.5 MG tablet TAKE 1 TABLET BY MOUTH ONCE DAILY AS NEEDED FOR ANXIETY (Patient not taking: Reported on 2/19/2025) 15 tablet 0     No current facility-administered medications for this visit.

## 2025-02-19 ENCOUNTER — OFFICE VISIT (OUTPATIENT)
Dept: HEMATOLOGY/ONCOLOGY | Facility: CLINIC | Age: 70
End: 2025-02-19
Payer: MEDICARE

## 2025-02-19 VITALS
SYSTOLIC BLOOD PRESSURE: 165 MMHG | BODY MASS INDEX: 28.86 KG/M2 | HEIGHT: 64 IN | DIASTOLIC BLOOD PRESSURE: 91 MMHG | HEART RATE: 76 BPM | RESPIRATION RATE: 18 BRPM | OXYGEN SATURATION: 100 % | TEMPERATURE: 98 F | WEIGHT: 169.06 LBS

## 2025-02-19 DIAGNOSIS — D50.9 IRON DEFICIENCY ANEMIA, UNSPECIFIED IRON DEFICIENCY ANEMIA TYPE: Primary | ICD-10-CM

## 2025-02-19 DIAGNOSIS — K64.9 BLEEDING HEMORRHOID: ICD-10-CM

## 2025-02-19 LAB — STFR SERPL-MCNC: 1.9 MG/L (ref 1.8–4.6)

## 2025-02-19 PROCEDURE — 99999 PR PBB SHADOW E&M-EST. PATIENT-LVL IV: CPT | Mod: PBBFAC,,,

## 2025-02-28 ENCOUNTER — TELEPHONE (OUTPATIENT)
Dept: PRIMARY CARE CLINIC | Facility: CLINIC | Age: 70
End: 2025-02-28
Payer: MEDICARE

## 2025-02-28 RX ORDER — DOXYCYCLINE 100 MG/1
100 CAPSULE ORAL EVERY 12 HOURS
Qty: 20 CAPSULE | Refills: 0 | Status: SHIPPED | OUTPATIENT
Start: 2025-02-28

## 2025-02-28 RX ORDER — BENZONATATE 100 MG/1
200 CAPSULE ORAL 3 TIMES DAILY PRN
Qty: 28 CAPSULE | Refills: 1 | Status: SHIPPED | OUTPATIENT
Start: 2025-02-28

## 2025-02-28 NOTE — TELEPHONE ENCOUNTER
Called patient and gave her MD recommendation. Patient will call back if she doesn't feel better after the weekend.

## 2025-02-28 NOTE — TELEPHONE ENCOUNTER
Patient c/o sinus congestion and watery eyes last week at that time she took Loratadine. She no longer has sinus drainage but has a cough. Has taken some kind of cough med the last 3 nights she does not know the name of. She is coughing up some yellow sputum. She has been afebrile. She feels she is getting progressively worse. She cannot take guaifenesin due to having an adverse reaction from it. She is using Riccola and Halls menthol cough drops. She was encouraged to get a cough suppressant for patients with HTN.

## 2025-02-28 NOTE — TELEPHONE ENCOUNTER
Since she is worsening I sent in a prescription for doxycycline.  Also Tessalon Perles to help with cough.

## 2025-03-05 ENCOUNTER — OFFICE VISIT (OUTPATIENT)
Dept: SURGERY | Facility: CLINIC | Age: 70
End: 2025-03-05
Payer: MEDICARE

## 2025-03-05 VITALS
RESPIRATION RATE: 16 BRPM | WEIGHT: 165.38 LBS | DIASTOLIC BLOOD PRESSURE: 84 MMHG | HEART RATE: 60 BPM | OXYGEN SATURATION: 100 % | HEIGHT: 64 IN | TEMPERATURE: 98 F | BODY MASS INDEX: 28.24 KG/M2 | SYSTOLIC BLOOD PRESSURE: 134 MMHG

## 2025-03-05 DIAGNOSIS — K64.9 BLEEDING HEMORRHOID: ICD-10-CM

## 2025-03-05 DIAGNOSIS — K64.8 BLEEDING INTERNAL HEMORRHOIDS: ICD-10-CM

## 2025-03-05 DIAGNOSIS — K64.2 PROLAPSED INTERNAL HEMORRHOIDS, GRADE 3: ICD-10-CM

## 2025-03-05 DIAGNOSIS — D50.0 IRON DEFICIENCY ANEMIA DUE TO CHRONIC BLOOD LOSS: Primary | ICD-10-CM

## 2025-03-05 PROCEDURE — 3079F DIAST BP 80-89 MM HG: CPT | Mod: CPTII,S$GLB,, | Performed by: COLON & RECTAL SURGERY

## 2025-03-05 PROCEDURE — 3288F FALL RISK ASSESSMENT DOCD: CPT | Mod: CPTII,S$GLB,, | Performed by: COLON & RECTAL SURGERY

## 2025-03-05 PROCEDURE — 1160F RVW MEDS BY RX/DR IN RCRD: CPT | Mod: CPTII,S$GLB,, | Performed by: COLON & RECTAL SURGERY

## 2025-03-05 PROCEDURE — 99999 PR PBB SHADOW E&M-EST. PATIENT-LVL IV: CPT | Mod: PBBFAC,,, | Performed by: COLON & RECTAL SURGERY

## 2025-03-05 PROCEDURE — 3075F SYST BP GE 130 - 139MM HG: CPT | Mod: CPTII,S$GLB,, | Performed by: COLON & RECTAL SURGERY

## 2025-03-05 PROCEDURE — 1157F ADVNC CARE PLAN IN RCRD: CPT | Mod: CPTII,S$GLB,, | Performed by: COLON & RECTAL SURGERY

## 2025-03-05 PROCEDURE — 46221 LIGATION OF HEMORRHOID(S): CPT | Mod: S$GLB,,, | Performed by: COLON & RECTAL SURGERY

## 2025-03-05 PROCEDURE — 1126F AMNT PAIN NOTED NONE PRSNT: CPT | Mod: CPTII,S$GLB,, | Performed by: COLON & RECTAL SURGERY

## 2025-03-05 PROCEDURE — 99213 OFFICE O/P EST LOW 20 MIN: CPT | Mod: 25,S$GLB,, | Performed by: COLON & RECTAL SURGERY

## 2025-03-05 PROCEDURE — 3008F BODY MASS INDEX DOCD: CPT | Mod: CPTII,S$GLB,, | Performed by: COLON & RECTAL SURGERY

## 2025-03-05 PROCEDURE — 1159F MED LIST DOCD IN RCRD: CPT | Mod: CPTII,S$GLB,, | Performed by: COLON & RECTAL SURGERY

## 2025-03-05 PROCEDURE — 1101F PT FALLS ASSESS-DOCD LE1/YR: CPT | Mod: CPTII,S$GLB,, | Performed by: COLON & RECTAL SURGERY

## 2025-03-05 NOTE — PROGRESS NOTES
HPI:  Bhavani Hill is a 69 y.o. female with history of bleeding and prolapsing hemorrhoids    Blood on TP with most BMs.  History of anemia.    Blood in bowl.  Even went to ED 1 year ago.      BMs daily, formed  Using colace  Prolapsing requires manual reduction    Colonoscopy 1-  Findings:       The perianal and digital rectal examinations were normal.        Scattered small-mouthed diverticula were found in the entire colon.        A 5 mm polyp was found in the ileocecal valve. The polyp was        semi-sessile. The polyp was removed with a cold snare. Resection and        retrieval were complete. Verification of patient identification for        the specimen was done. Estimated blood loss was minimal.        A 4 mm polyp was found in the sigmoid colon. The polyp was        semi-pedunculated. The polyp was removed with a cold snare.        Resection and retrieval were complete. Verification of patient        identification for the specimen was done. Estimated blood loss was        minimal.        The terminal ileum appeared normal.        Non-bleeding internal hemorrhoids were found during retroflexion.        The hemorrhoids were moderate and Grade III (internal hemorrhoids        that prolapse but require manual reduction). No biopsies or other        specimens were collected for this exam.     Collected: 01/24/25 1018   Result status: Final   Resulting lab: [facility]   Value: 1. Ileocecal valve, polypectomy:  - Tubular adenoma  - Negative for high-grade dysplasia or malignancy    2. Colon, sigmoid, polypectomy:  - Tubular adenoma  - Negative for high-grade dysplasia or malignancy   Comment: Interp By Jonatan Gustafson MD, PhD, Signed on 01/27/2025 at 13:14     Past Medical History:   Diagnosis Date    Essential (primary) hypertension     since ~ 2000    Family history of breast cancer     Mother at age 58, Sister age 35    H/O bone density study 06/02/2015    R hip: 0.20   //   L hip:  0.02   //   [place] 2[place]46    [place] [place]     History of cholelithiasis     Hypothyroidism     since     Menopause     Pap smear for cervical cancer screening 2015    [place] [place] [facility] [place]         Past Surgical History:   Procedure Laterality Date     SECTION      x 2    CHOLECYSTECTOMY      COLONOSCOPY, SCREENING, LOW RISK PATIENT N/A 2025    Procedure: COLONOSCOPY, SCREENING, LOW RISK PATIENT;  Surgeon: PHILL Quintero MD;  Location: [facility] END[facility];  Service: Endoscopy;  Laterality: N/A;    LAPAROTOMY, EXPLORATORY N/A 10/23/2023    Procedure: LAPAROTOMY, EXPLORATORY;  Surgeon: Gilberto Whiteside MD;  Location: [facility];  Service: OB/GYN;  Laterality: N/A;    SALPINGOOPHORECTOMY Bilateral 10/23/2023    Procedure: SALPINGO-OOPHORECTOMY;  Surgeon: Gilberto Whiteside MD;  Location: [facility] OR;  Service: OB/GYN;  Laterality: Bilateral;    TOTAL ABDOMINAL HYSTERECTOMY N/A 10/23/2023    Procedure: HYSTERECTOMY, TOTAL, ABDOMINAL;  Surgeon: Gilberto Whiteside MD;  Location: [facility];  Service: OB/GYN;  Laterality: N/A;  PROCEED AS INDICATED       Review of patient's allergies indicates:   Allergen Reactions    Trazodone Other (See Comments)     Hyperactivity, personality/mood change    Guaifenesin Other (See Comments)     Eyes and nose water       Family History   Problem Relation Name Age of Onset    Breast cancer Mother Inez Torres 58    Cancer Mother Inez Torres         Breast    Breast cancer Sister Toya     Cancer Sister Bryanna  Muniz Garcia         Breast and ? biliary CA    Colon cancer Neg Hx      Ovarian cancer Neg Hx         Social History     Socioeconomic History    Marital status:      Spouse name: Eliel   Occupational History    Occupation: teacher   Tobacco Use    Smoking status: Never    Smokeless tobacco: Never   Substance and Sexual Activity    Alcohol use: Yes     Alcohol/week: 0.0 standard drinks of alcohol     Comment: 1 martini daily    Drug use: Never    Sexual activity: Yes     Birth  "control/protection: Post-menopausal     Social Drivers of Health     Financial Resource Strain: Low Risk  (2/14/2025)    Overall Financial Resource Strain (CARDIA)     Difficulty of Paying Living Expenses: Not hard at all   Food Insecurity: No Food Insecurity (2/14/2025)    Hunger Vital Sign     Worried About Running Out of Food in the Last Year: Never true     Ran Out of Food in the Last Year: Never true   Transportation Needs: No Transportation Needs (2/14/2025)    PRAPARE - Transportation     Lack of Transportation (Medical): No     Lack of Transportation (Non-Medical): No   Physical Activity: Insufficiently Active (2/14/2025)    Exercise Vital Sign     Days of Exercise per Week: 4 days     Minutes of Exercise per Session: 20 min   Stress: No Stress Concern Present (2/14/2025)    [employer] - Occupational Stress Questionnaire     Feeling of Stress : Only a little   Housing Stability: Low Risk  (2/14/2025)    Housing Stability Vital Sign     Unable to Pay for Housing in the Last Year: No     Number of Times Moved in the Last Year: 0     Homeless in the Last Year: No       ROS:  GENERAL: No fever, chills, fatigability or weight loss.  Integument: No rashes, redness, icterus  CHEST: Denies OLMOS, cyanosis, wheezing, cough and sputum production.  CARDIOVASCULAR: Denies chest pain, PND, orthopnea or reduced exercise tolerance.  GI: Denies abd pain, dysphagia, nausea, vomiting, no hematemesis   : Denies burning on urination, no hematuria, no bacteriuria  MSK: No deformities, swelling, joint pain swelling  Neurologic: No HAs, seizures, weakness, paresthesias, gait problems    PE:  General appearance iNAD  /84 (BP Location: Left arm, Patient Position: Sitting)   Pulse 60   Temp 98.1 °F (36.7 °C) (Temporal)   Resp 16   Ht 5' 4" (1.626 m)   Wt 75 kg (165 lb 5.5 oz)   SpO2 100%   BMI 28.38 kg/m²     Sclera/ Skin anicteric  AT NC EOMI  Neck supple trachea midline   Chest symmetric, " nl excursion, no retractions, breathing comfortably  EXT - no CCE  Neuro:  Mood/ affect nl, alert and oriented x 3, moves all ext's, gait nl    Rectal  Inspection     RANDOLPH nl tone, no mass, palpable internal hemorrhoids      Assessment:  Bleeding and prolapsing internal hemorrhoids    Plan:  Discussed with the patient length.  Recommend high-fiber diet and Metamucil daily  Given her daily bleeding and anemia I have recommended that she undergo anoscopy with rubber-band ligation today.  The patient is agreeable.    Hemorrhoid Rubber Band Ligation Procedure Note    Verbal consent obtained for anoscopy and rubber band ligation.    The details of the procedure and the expected outcome discussed..  The risk of persistent symptoms, need for repeat banding, bleeding, infection discussed with pt.      Indications: The patient had a history of bleeding and prolapsing  internal hemorrhoids.    Pre-operative Diagnosis: Internal hemorrhoids with bleeding and prolapse     Post-operative Diagnosis: Internal hemorrhoids with bleeding and prolapse    Surgeon: TRES HENRY     Assistants:  Yasmani    Findings at anoscopy:    Left lateral internal hemorrhoid grade 3  Right anterior internal hemorrhoid grade 3  Right posterior internal hemorrhoid grade 3    Procedure Details   Acritical timeout was performed.  Digital rectal exam was performed.  I then placed a lubricated anoscope into the anal canal. Suction band ligation was then performed of the internal hemorrhoids in the  right anterior, right posterior and left lateral positions.             Complications:  None; patient tolerated the procedure well.           Disposition: Discharge from office to home           Condition: stable    Instruction sheet given to patient  High fiber diet  Drink eight glasses of water per day  Miralax as necessary  OV in 6 weeks

## 2025-03-23 DIAGNOSIS — E03.9 ACQUIRED HYPOTHYROIDISM: ICD-10-CM

## 2025-03-23 DIAGNOSIS — I10 ESSENTIAL HYPERTENSION: ICD-10-CM

## 2025-03-24 RX ORDER — LISINOPRIL AND HYDROCHLOROTHIAZIDE 12.5; 2 MG/1; MG/1
2 TABLET ORAL DAILY
Qty: 180 TABLET | Refills: 1 | Status: SHIPPED | OUTPATIENT
Start: 2025-03-24

## 2025-03-24 RX ORDER — LEVOTHYROXINE SODIUM 50 UG/1
50 TABLET ORAL
Qty: 90 TABLET | Refills: 1 | Status: SHIPPED | OUTPATIENT
Start: 2025-03-24

## 2025-03-24 RX ORDER — METOPROLOL SUCCINATE 25 MG/1
25 TABLET, EXTENDED RELEASE ORAL
Qty: 90 TABLET | Refills: 1 | Status: SHIPPED | OUTPATIENT
Start: 2025-03-24

## 2025-03-25 DIAGNOSIS — F40.248 OTHER SITUATIONAL TYPE PHOBIA: ICD-10-CM

## 2025-03-26 RX ORDER — ALPRAZOLAM 0.5 MG/1
TABLET ORAL
Qty: 15 TABLET | Refills: 0 | Status: SHIPPED | OUTPATIENT
Start: 2025-03-26

## 2025-04-14 ENCOUNTER — LAB VISIT (OUTPATIENT)
Dept: LAB | Facility: HOSPITAL | Age: 70
End: 2025-04-14
Attending: FAMILY MEDICINE
Payer: MEDICARE

## 2025-04-14 DIAGNOSIS — E78.00 ELEVATED CHOLESTEROL: Chronic | ICD-10-CM

## 2025-04-14 DIAGNOSIS — I70.0 AORTIC ATHEROSCLEROSIS: Chronic | ICD-10-CM

## 2025-04-14 LAB
CHOLEST SERPL-MCNC: 159 MG/DL (ref 120–199)
CHOLEST/HDLC SERPL: 1.8 {RATIO} (ref 2–5)
HDLC SERPL-MCNC: 88 MG/DL (ref 40–75)
HDLC SERPL: 55.3 % (ref 20–50)
LDLC SERPL CALC-MCNC: 61.2 MG/DL (ref 63–159)
NONHDLC SERPL-MCNC: 71 MG/DL
TRIGL SERPL-MCNC: 49 MG/DL (ref 30–150)

## 2025-04-14 PROCEDURE — 80061 LIPID PANEL: CPT

## 2025-04-14 PROCEDURE — 36415 COLL VENOUS BLD VENIPUNCTURE: CPT | Mod: PO

## 2025-04-15 ENCOUNTER — RESULTS FOLLOW-UP (OUTPATIENT)
Dept: PRIMARY CARE CLINIC | Facility: CLINIC | Age: 70
End: 2025-04-15

## 2025-04-16 ENCOUNTER — OFFICE VISIT (OUTPATIENT)
Dept: SURGERY | Facility: CLINIC | Age: 70
End: 2025-04-16
Payer: MEDICARE

## 2025-04-16 VITALS
DIASTOLIC BLOOD PRESSURE: 82 MMHG | RESPIRATION RATE: 16 BRPM | HEART RATE: 75 BPM | OXYGEN SATURATION: 98 % | TEMPERATURE: 98 F | WEIGHT: 166.44 LBS | BODY MASS INDEX: 28.42 KG/M2 | HEIGHT: 64 IN | SYSTOLIC BLOOD PRESSURE: 132 MMHG

## 2025-04-16 DIAGNOSIS — K64.1 GRADE II HEMORRHOIDS: Primary | ICD-10-CM

## 2025-04-16 PROCEDURE — 99999 PR PBB SHADOW E&M-EST. PATIENT-LVL III: CPT | Mod: PBBFAC,,, | Performed by: COLON & RECTAL SURGERY

## 2025-04-16 NOTE — PROGRESS NOTES
HPI:  Bhavani Hill is a 69 y.o. female with history of bleeding and prolapsing hemorrhoids     Blood on TP with most BMs.  History of anemia.    Blood in bowl.  Even went to ED 1 year ago.       BMs daily, formed  Using colace  Prolapsing requires manual reduction     Colonoscopy 1-  Findings:       The perianal and digital rectal examinations were normal.        Scattered small-mouthed diverticula were found in the entire colon.        A 5 mm polyp was found in the ileocecal valve. The polyp was        semi-sessile. The polyp was removed with a cold snare. Resection and        retrieval were complete. Verification of patient identification for        the specimen was done. Estimated blood loss was minimal.        A 4 mm polyp was found in the sigmoid colon. The polyp was        semi-pedunculated. The polyp was removed with a cold snare.        Resection and retrieval were complete. Verification of patient        identification for the specimen was done. Estimated blood loss was        minimal.        The terminal ileum appeared normal.        Non-bleeding internal hemorrhoids were found during retroflexion.        The hemorrhoids were moderate and Grade III (internal hemorrhoids        that prolapse but require manual reduction). No biopsies or other        specimens were collected for this exam.      Collected: 01/24/25 1018   Result status: Final   Resulting lab: [facility]   Value: 1. Ileocecal valve, polypectomy:  - Tubular adenoma  - Negative for high-grade dysplasia or malignancy    2. Colon, sigmoid, polypectomy:  - Tubular adenoma  - Negative for high-grade dysplasia or malignancy   Comment: Interp By Jonatan Gustafson MD, PhD, Signed on 01/27/2025 at 13:14        4-  Interval hx  Improved for about a month but has recurrent blood on the toilet paper and reports prolapse.  One sometimes 2 bowel movements in the morning.  Denies straining.           Past Medical History:   Diagnosis Date     Essential (primary) hypertension       since ~     Family history of breast cancer       Mother at age 58, Sister age 35    H/O bone density study 2015     R hip: 0.20   //   L hip:  0.02   //  sp[place] 2.46    [place] [place]     History of cholelithiasis      Hypothyroidism       since     Menopause      Pap smear for cervical cancer screening 2015     Pap/Hpv negative  ([facility])                Past Surgical History:   Procedure Laterality Date     SECTION         x 2    CHOLECYSTECTOMY   2010    COLONOSCOPY, SCREENING, LOW RISK PATIENT N/A 2025     Procedure: COLONOSCOPY, SCREENING, LOW RISK PATIENT;  Surgeon: PHILL Quintero MD;  Location: [facility] ENDO;  Service: Endoscopy;  Laterality: N/A;    LAPAROTOMY, EXPLORATORY N/A 10/23/2023     Procedure: LAPAROTOMY, EXPLORATORY;  Surgeon: Gilberto Whiteside MD;  Location: [facility] OR;  Service: OB/GYN;  Laterality: N/A;    SALPINGOOPHORECTOMY Bilateral 10/23/2023     Procedure: SALPINGO-OOPHORECTOMY;  Surgeon: Gilberto Whiteside MD;  Location: [facility] OR;  Service: OB/GYN;  Laterality: Bilateral;    TOTAL ABDOMINAL HYSTERECTOMY N/A 10/23/2023     Procedure: HYSTERECTOMY, TOTAL, ABDOMINAL;  Surgeon: Gilberto Whiteside MD;  Location: [facility] OR;  Service: OB/GYN;  Laterality: N/A;  PROCEED AS INDICATED               Review of patient's allergies indicates:   Allergen Reactions    Trazodone Other (See Comments)       Hyperactivity, personality/mood change    Guaifenesin Other (See Comments)       Eyes and nose water                Family History   Problem Relation Name Age of Onset    Breast cancer Mother Inez Torres 58    Cancer Mother Inez Torres           Breast    Breast cancer Sister Toya      Cancer Sister Bryanna  Muniz Garcia           Breast and ? biliary CA    Colon cancer Neg Hx        Ovarian cancer Neg Hx             Social History            Socioeconomic History    Marital status:        Spouse name: Ray   Occupational History    Occupation:  teacher   Tobacco Use    Smoking status: Never    Smokeless tobacco: Never   Substance and Sexual Activity    Alcohol use: Yes       Alcohol/week: 0.0 standard drinks of alcohol       Comment: 1 martini daily    Drug use: Never    Sexual activity: Yes       Birth control/protection: Post-menopausal      Social Drivers of Health           Financial Resource Strain: Low Risk  (2/14/2025)     Overall Financial Resource Strain (CARDIA)      Difficulty of Paying Living Expenses: Not hard at all   Food Insecurity: No Food Insecurity (2/14/2025)     Hunger Vital Sign      Worried About Running Out of Food in the Last Year: Never true      Ran Out of Food in the Last Year: Never true   Transportation Needs: No Transportation Needs (2/14/2025)     PRAPARE - Transportation      Lack of Transportation (Medical): No      Lack of Transportation (Non-Medical): No   Physical Activity: Insufficiently Active (2/14/2025)     Exercise Vital Sign      Days of Exercise per Week: 4 days      Minutes of Exercise per Session: 20 min   Stress: No Stress Concern Present (2/14/2025)     Citizen of [place] of Occupational Health - Occupational Stress Questionnaire      Feeling of Stress : Only a little   Housing Stability: Low Risk  (2/14/2025)     Housing Stability Vital Sign      Unable to Pay for Housing in the Last Year: No      Number of Times Moved in the Last Year: 0      Homeless in the Last Year: No         ROS:  GENERAL: No fever, chills, fatigability or weight loss.  Integument: No rashes, redness, icterus  CHEST: Denies OLMOS, cyanosis, wheezing, cough and sputum production.  CARDIOVASCULAR: Denies chest pain, PND, orthopnea or reduced exercise tolerance.  GI: Denies abd pain, dysphagia, nausea, vomiting, no hematemesis   : Denies burning on urination, no hematuria, no bacteriuria  MSK: No deformities, swelling, joint pain swelling  Neurologic: No HAs, seizures, weakness, paresthesias, gait problems     PE:  General appearance  "iNAD  /82 (BP Location: Left arm, Patient Position: Sitting)   Pulse 75   Temp 97.8 °F (36.6 °C) (Temporal)   Resp 16   Ht 5' 4" (1.626 m)   Wt 75.5 kg (166 lb 7.2 oz)   SpO2 98%   BMI 28.57 kg/m²   Sclera/ Skin anicteric  AT NC EOMI  Neck supple trachea midline   Chest symmetric, nl excursion, no retractions, breathing comfortably  EXT - no CCE  Neuro:  Mood/ affect nl, alert and oriented x 3, moves all ext's, gait nl           Assessment:  Bleeding and prolapsing internal hemorrhoids s/p RBL  x 3 - residual hemorrhoids and symptoms     Plan:  High fiber diet - 25 grams per day.  Emphasis on whole grain breads such as double fiber wheat bread and high fiber cereals and bars.    Drink 8 glasses of water per day 64 - 96 oz per day  Fiber supplements such as KONSYL, METAMUCIL can be taken 1-2 x per day  Regular exercise - 30 min brisk walking 5 days per week  Repeat rubber band ligation        Anoscopy Procedure Note:   Verbal consent obtained    Indications:  anal bleeding    Post procedure diagnosis:  same    Procedure: anoscopy    Surgeon ELAINE    Assistant: MA    Specimen none    Findings:  see bleow    Right posterior hemorrhoid grade 2  Right anterior hemorrhoid grade 2  Left lateral hemorrhoid grade 1    Pt tolerated procedure well.      No complications.    "

## 2025-04-21 ENCOUNTER — OFFICE VISIT (OUTPATIENT)
Dept: PRIMARY CARE CLINIC | Facility: CLINIC | Age: 70
End: 2025-04-21
Payer: MEDICARE

## 2025-04-21 VITALS
SYSTOLIC BLOOD PRESSURE: 128 MMHG | BODY MASS INDEX: 28.51 KG/M2 | DIASTOLIC BLOOD PRESSURE: 88 MMHG | HEIGHT: 64 IN | HEART RATE: 62 BPM | OXYGEN SATURATION: 98 % | WEIGHT: 167 LBS

## 2025-04-21 DIAGNOSIS — I10 ESSENTIAL HYPERTENSION: Primary | Chronic | ICD-10-CM

## 2025-04-21 DIAGNOSIS — E03.9 HYPOTHYROIDISM, UNSPECIFIED TYPE: Chronic | ICD-10-CM

## 2025-04-21 DIAGNOSIS — Z12.31 ENCOUNTER FOR SCREENING MAMMOGRAM FOR MALIGNANT NEOPLASM OF BREAST: ICD-10-CM

## 2025-04-21 DIAGNOSIS — E78.00 ELEVATED CHOLESTEROL: Chronic | ICD-10-CM

## 2025-04-21 PROCEDURE — 4010F ACE/ARB THERAPY RXD/TAKEN: CPT | Mod: CPTII,S$GLB,, | Performed by: FAMILY MEDICINE

## 2025-04-21 PROCEDURE — 1159F MED LIST DOCD IN RCRD: CPT | Mod: CPTII,S$GLB,, | Performed by: FAMILY MEDICINE

## 2025-04-21 PROCEDURE — 99214 OFFICE O/P EST MOD 30 MIN: CPT | Mod: S$GLB,,, | Performed by: FAMILY MEDICINE

## 2025-04-21 PROCEDURE — 3079F DIAST BP 80-89 MM HG: CPT | Mod: CPTII,S$GLB,, | Performed by: FAMILY MEDICINE

## 2025-04-21 PROCEDURE — 3008F BODY MASS INDEX DOCD: CPT | Mod: CPTII,S$GLB,, | Performed by: FAMILY MEDICINE

## 2025-04-21 PROCEDURE — 1126F AMNT PAIN NOTED NONE PRSNT: CPT | Mod: CPTII,S$GLB,, | Performed by: FAMILY MEDICINE

## 2025-04-21 PROCEDURE — 99999 PR PBB SHADOW E&M-EST. PATIENT-LVL IV: CPT | Mod: PBBFAC,,, | Performed by: FAMILY MEDICINE

## 2025-04-21 PROCEDURE — 1123F ACP DISCUSS/DSCN MKR DOCD: CPT | Mod: CPTII,S$GLB,, | Performed by: FAMILY MEDICINE

## 2025-04-21 PROCEDURE — 3074F SYST BP LT 130 MM HG: CPT | Mod: CPTII,S$GLB,, | Performed by: FAMILY MEDICINE

## 2025-04-21 PROCEDURE — 1101F PT FALLS ASSESS-DOCD LE1/YR: CPT | Mod: CPTII,S$GLB,, | Performed by: FAMILY MEDICINE

## 2025-04-21 PROCEDURE — 1160F RVW MEDS BY RX/DR IN RCRD: CPT | Mod: CPTII,S$GLB,, | Performed by: FAMILY MEDICINE

## 2025-04-21 PROCEDURE — 3288F FALL RISK ASSESSMENT DOCD: CPT | Mod: CPTII,S$GLB,, | Performed by: FAMILY MEDICINE

## 2025-04-21 NOTE — PROGRESS NOTES
Primary Care Provider Appointment   [facility] [address] [facility] [place]       Patient ID: Bhavani Hill is a 69 y.o. female.    ASSESSMENT/PLAN by Problem List:    Assessment & Plan    IMPRESSION:  - HTN under reasonable control with occasional mild borderline diastolic readings; anxiety may contribute.  - Elevated cholesterol associated with atherosclerosis improved with current treatment; LDL within target at 61, excellent HDL, and normal triglycerides.  - Hypothyroidism stable and well-controlled on current levothyroxine dose; clinically stable despite TSH not included in recent labs.  - Ruled out thyroid function as potential cause of reported bloating given stable history.  - Reviewed recent BP reading of 130s/84 from another provider visit.    HYPERTENSION:  - Monitor blood pressure at home 2 times per week.  - Continue lisinopril/HCTZ, 2 tablets daily.  - Noted occasional mild borderline diastolic readings, with a recent reading of 130/84 at a doctor's visit.  - Assessed that blood pressure appears to be under reasonable control.  - Advised to maintain a lower sodium diet and regular exercise.  - Instructed the patient to limit salt intake and check labels on food.  - Ms. Hill to maintain lower sodium diet.  - Educated on importance of limiting salt intake, including checking food labels for sodium content.    ATHEROSCLEROSIS:  - Noted that atherosclerosis was incidentally seen on previous imaging.  - Continue rosuvastatin 10 mg daily for cholesterol management associated with atherosclerosis.    HYPOTHYROIDISM:  - Continue levothyroxine 50 mcg daily.  - Ordered TSH test to be added to upcoming hematology labs scheduled for May 15th.  - Noted that hypothyroidism has been stable and well controlled on current medication.  - Assessed that the patient is clinically stable despite TSH not being included in most recent labs.    HYPERLIPIDEMIA:  - Continue rosuvastatin 10 mg daily.  - Explained that  "some lab values may appear as "abnormal" on reports due to being outside reference range, but can actually indicate positive health status (e.g., high HDL cholesterol).  - Noted improvement in patient's lipids, with LDL within target at 61, excellent HDL, and normal triglycerides.  - Recommend Mediterranean-style diet.  - Ms. Hill to continue regular exercise and maintain Mediterranean-style diet.    FOLLOW-UP:  - Follow up in 5-6 months.  - Contact the office if any issues arise before next scheduled visit.         CODING, ORDERS, AND ADDITIONAL DOCUMENTATION RELATED TO THIS ENCOUNTER:  (note that the diagnoses and clinical summaries above were generated with the assistance of [employer] software and represents my assessment and plan.  This software does not always generate the precise nor most specific diagnosis codes.  Therefore the specific diagnosis codes and orders for billing purposes are detailed below along with any additional documentation if needed)      1. Essential hypertension  Overview:  since ~ 2000      2. Elevated cholesterol    3. Hypothyroidism, unspecified type  Overview:  since 2008    Orders:  -     TSH; Future; Expected date: 04/21/2025    4. Encounter for screening mammogram for malignant neoplasm of breast  -     Mammo Digital Screening Bilat w/ Huan (XPD); Future; Expected date: 04/21/2025           Follow Up:  5-6 months    Health Maintenance         Date Due Completion Date    Mammogram 04/03/2025 4/3/2024    Override on 7/28/2014: Done    Hemoglobin A1c (Diabetic Prevention Screening) 05/19/2025 5/19/2022    High Dose Statin 04/21/2026 4/21/2025    TETANUS VACCINE 11/21/2026 11/21/2016    DEXA Scan 12/18/2027 12/18/2024    Colorectal Cancer Screening 01/24/2030 1/24/2025    Lipid Panel 04/14/2030 4/14/2025            Advance Care Planning     Date: 04/21/2025    ACP Reviewed/No Changes  Voluntary advance care planning discussion had today with patient. Previously completed HCPOA " and LW in electronic medical record is current, no changes made.    Reviewed documents on file  A total of <5 min was spent on advance care planning, goals of care discussion, emotional support, formulating and communicating prognosis and exploring burden/benefit of various approaches of treatment. This discussion occurred on a fully voluntary basis with the verbal consent of the patient and/or family.               Subjective:       Follow-up  Pertinent negatives include no arthralgias, chest pain, headaches, joint swelling, neck pain, vomiting or weakness.       Patient is a/an 69 y.o.  female     For complete problem list, past medical history, surgical history, social history, etc., see appropriate section in the electronic medical record    History of Present Illness    CHIEF COMPLAINT:  Ms. Hill presents today for follow up.    CURRENT SYMPTOMS:  She reports bloating and water retention for the past week. She also notes hand pain after performing yard work, but denies visible swelling of the extremities.    HEMORRHOIDS:  She had rubber band ligation performed on three hemorrhoids. She reports occasional blood on tissue, though less severe than previously.    CARDIOVASCULAR:  She has chronic hypertension with occasional mild borderline diastolic readings. Recent blood pressure reading was 130/84. She reports anxiety contributes to her hypertension. She has elevated cholesterol associated with atherosclerosis incidentally seen on previous imaging.    MEDICATIONS:  She continues lisinopril hydrochlorothiazide twice daily, rosuvastatin 10 mg daily, and levothyroxine 50 MCG daily.    ENDOCRINE:  She reports chronic hypothyroidism that remains stable on current medication.      ROS:  Gastrointestinal: +bloating, +rectal bleeding  Musculoskeletal: +joint pain, +pain with movement       Review of Systems   Constitutional:  Negative for activity change and unexpected weight change.   HENT:  Negative for hearing loss,  "rhinorrhea and trouble swallowing.    Eyes:  Negative for discharge and visual disturbance.   Respiratory:  Negative for chest tightness and wheezing.    Cardiovascular:  Negative for chest pain and palpitations.   Gastrointestinal:  Negative for blood in stool, constipation, diarrhea and vomiting.   Endocrine: Negative for polydipsia and polyuria.   Genitourinary:  Negative for difficulty urinating, dysuria, hematuria and menstrual problem.   Musculoskeletal:  Negative for arthralgias, joint swelling and neck pain.   Neurological:  Negative for weakness and headaches.   Psychiatric/Behavioral:  Negative for confusion and dysphoric mood.        Objective     Physical Exam  Vitals reviewed.   Constitutional:       General: She is not in acute distress.     Appearance: She is well-developed. She is not ill-appearing.   HENT:      Head: Normocephalic and atraumatic.   Eyes:      General: No scleral icterus.     Conjunctiva/sclera: Conjunctivae normal.   Cardiovascular:      Rate and Rhythm: Normal rate and regular rhythm.      Heart sounds: Normal heart sounds. No murmur heard.  Pulmonary:      Effort: Pulmonary effort is normal. No respiratory distress.      Breath sounds: Normal breath sounds. No wheezing or rales.   Musculoskeletal:      Right lower leg: No edema.      Left lower leg: No edema.   Skin:     General: Skin is dry.      Findings: No rash.   Neurological:      Mental Status: She is alert and oriented to person, place, and time.   Psychiatric:         Behavior: Behavior normal.       Vitals:    04/21/25 1406   BP: 128/88   BP Location: Right arm   Patient Position: Sitting   Pulse: 62   SpO2: 98%   Weight: 75.8 kg (167 lb)   Height: 5' 4" (1.626 m)     Physical Exam                This note was generated with the assistance of ambient listening technology. Verbal consent was obtained by the patient and accompanying visitor(s) for the recording of patient appointment to facilitate this note. I attest to " having reviewed and edited the generated note for accuracy, though some syntax or spelling errors may persist. Please contact the author of this note for any clarification.  Parts of the note were also generated with voice recognition software.  Occasionally this software will misinterpreted words or phrases

## 2025-05-03 ENCOUNTER — HOSPITAL ENCOUNTER (OUTPATIENT)
Dept: RADIOLOGY | Facility: HOSPITAL | Age: 70
Discharge: HOME | End: 2025-05-03
Attending: FAMILY MEDICINE
Payer: MEDICARE

## 2025-05-03 DIAGNOSIS — Z12.31 ENCOUNTER FOR SCREENING MAMMOGRAM FOR MALIGNANT NEOPLASM OF BREAST: ICD-10-CM

## 2025-05-03 PROCEDURE — 77067 SCR MAMMO BI INCL CAD: CPT | Mod: 26,,, | Performed by: RADIOLOGY

## 2025-05-03 PROCEDURE — 77067 SCR MAMMO BI INCL CAD: CPT | Mod: TC,PO

## 2025-05-03 PROCEDURE — 77063 BREAST TOMOSYNTHESIS BI: CPT | Mod: 26,,, | Performed by: RADIOLOGY

## 2025-05-15 ENCOUNTER — LAB VISIT (OUTPATIENT)
Dept: LAB | Facility: HOSPITAL | Age: 70
End: 2025-05-15
Payer: MEDICARE

## 2025-05-15 DIAGNOSIS — E03.9 HYPOTHYROIDISM, UNSPECIFIED TYPE: Chronic | ICD-10-CM

## 2025-05-15 DIAGNOSIS — D50.9 IRON DEFICIENCY ANEMIA, UNSPECIFIED IRON DEFICIENCY ANEMIA TYPE: ICD-10-CM

## 2025-05-15 LAB
ABSOLUTE EOSINOPHIL (OHS): 0.11 K/UL
ABSOLUTE MONOCYTE (OHS): 0.48 K/UL (ref 0.3–1)
ABSOLUTE NEUTROPHIL COUNT (OHS): 1.55 K/UL (ref 1.8–7.7)
ALBUMIN SERPL BCP-MCNC: 3.9 G/DL (ref 3.5–5.2)
ALP SERPL-CCNC: 63 UNIT/L (ref 40–150)
ALT SERPL W/O P-5'-P-CCNC: 20 UNIT/L (ref 10–44)
ANION GAP (OHS): 10 MMOL/L (ref 8–16)
AST SERPL-CCNC: 26 UNIT/L (ref 11–45)
BASOPHILS # BLD AUTO: 0.02 K/UL
BASOPHILS NFR BLD AUTO: 0.6 %
BILIRUB SERPL-MCNC: 0.8 MG/DL (ref 0.1–1)
BUN SERPL-MCNC: 8 MG/DL (ref 8–23)
CALCIUM SERPL-MCNC: 9.2 MG/DL (ref 8.7–10.5)
CHLORIDE SERPL-SCNC: 101 MMOL/L (ref 95–110)
CO2 SERPL-SCNC: 25 MMOL/L (ref 23–29)
CREAT SERPL-MCNC: 0.7 MG/DL (ref 0.5–1.4)
ERYTHROCYTE [DISTWIDTH] IN BLOOD BY AUTOMATED COUNT: 12.6 % (ref 11.5–14.5)
FERRITIN SERPL-MCNC: 53 NG/ML (ref 20–300)
GFR SERPLBLD CREATININE-BSD FMLA CKD-EPI: >60 ML/MIN/1.73/M2
GLUCOSE SERPL-MCNC: 95 MG/DL (ref 70–110)
HCT VFR BLD AUTO: 37.6 % (ref 37–48.5)
HGB BLD-MCNC: 13.2 GM/DL (ref 12–16)
IMM GRANULOCYTES # BLD AUTO: 0.01 K/UL (ref 0–0.04)
IMM GRANULOCYTES NFR BLD AUTO: 0.3 % (ref 0–0.5)
IRON SATN MFR SERPL: 11 % (ref 20–50)
IRON SERPL-MCNC: 48 UG/DL (ref 30–160)
LYMPHOCYTES # BLD AUTO: 1.23 K/UL (ref 1–4.8)
MCH RBC QN AUTO: 30 PG (ref 27–31)
MCHC RBC AUTO-ENTMCNC: 35.1 G/DL (ref 32–36)
MCV RBC AUTO: 86 FL (ref 82–98)
NUCLEATED RBC (/100WBC) (OHS): 0 /100 WBC
PLATELET # BLD AUTO: 178 K/UL (ref 150–450)
PMV BLD AUTO: 10.3 FL (ref 9.2–12.9)
POTASSIUM SERPL-SCNC: 3.7 MMOL/L (ref 3.5–5.1)
PROT SERPL-MCNC: 6.8 GM/DL (ref 6–8.4)
RBC # BLD AUTO: 4.4 M/UL (ref 4–5.4)
RELATIVE EOSINOPHIL (OHS): 3.2 %
RELATIVE LYMPHOCYTE (OHS): 36.2 % (ref 18–48)
RELATIVE MONOCYTE (OHS): 14.1 % (ref 4–15)
RELATIVE NEUTROPHIL (OHS): 45.6 % (ref 38–73)
SODIUM SERPL-SCNC: 136 MMOL/L (ref 136–145)
TIBC SERPL-MCNC: 445 UG/DL (ref 250–450)
TRANSFERRIN SERPL-MCNC: 301 MG/DL (ref 200–375)
TSH SERPL-ACNC: 3.71 UIU/ML (ref 0.4–4)
WBC # BLD AUTO: 3.4 K/UL (ref 3.9–12.7)

## 2025-05-15 PROCEDURE — 82435 ASSAY OF BLOOD CHLORIDE: CPT | Mod: PN

## 2025-05-15 PROCEDURE — 85025 COMPLETE CBC W/AUTO DIFF WBC: CPT | Mod: PN

## 2025-05-15 PROCEDURE — 82728 ASSAY OF FERRITIN: CPT

## 2025-05-15 PROCEDURE — 84466 ASSAY OF TRANSFERRIN: CPT

## 2025-05-15 PROCEDURE — 84443 ASSAY THYROID STIM HORMONE: CPT

## 2025-05-15 PROCEDURE — 36415 COLL VENOUS BLD VENIPUNCTURE: CPT | Mod: PN

## 2025-05-16 ENCOUNTER — RESULTS FOLLOW-UP (OUTPATIENT)
Dept: PRIMARY CARE CLINIC | Facility: CLINIC | Age: 70
End: 2025-05-16

## 2025-05-16 NOTE — PROGRESS NOTES
"PATIENT: Bhavani Hill  MRN: [id number]  DATE: 5/19/2025      Diagnosis:   1. Iron deficiency anemia, unspecified iron deficiency anemia type          Chief Complaint: Follow-up (3mths FU with Labs 5/15/)          Subjective:    Interval History: Ms. Hill is a 69 y.o. female who returns for follow up. She reports feeling well overall. Fatigue resolved.  Notes scant bleeding when wiping. Is following with Dr Quintero for band ligation of 3 internal hemorrhoids. Denies cp, sob, palpitations.     Her most recent labs are from 5/15/25 and they are as follows:  Ferritin was 53 ng/mL  WBCs were 3,400 per cubic mm, hemoglobin 13.2 grams/deciliter, hematocrit 37.6%, MCV 86, and platelets 178 K.  TIBC 445    Prior History:   Per Record:  Mrs Hill is a 68-year-old female referred for iron deficiency anemia.  The most recent labs are from 08/07/2023 and they are as follows:  Ferritin was 14 ng/mL  WBCs were 4,800 per cubic mm, hemoglobin 11.6 grams/deciliter, hematocrit 36.1%, MCV 79, and platelets 223 K.  A CBC on 06/05/2023 showed a white count of 5300 per cubic mm, hemoglobin 10.6 grams/deciliter, hematocrit 34.2%, MCV 82 and platelets 240 K.  On the same day the ferritin was 9 ng/mL, while 3 stool samples that she submitted were negative for occult blood  A CBC on 05/04/2023 had shown a white count of 5400 per cubic mm, hemoglobin 11.6 grams/deciliter, hematocrit 36.1%, MCV 89 and platelets 197 K.  A CBC on May 19, 2022 had shown a white count of 4500 per cubic mm, hemoglobin 12.7 grams/deciliter, hematocrit 39.9%, MCV 85 and platelets 234 K.  She is referred for evaluation.  Other pertinent labs include a ferritin of 10 ng/mL.  On April 3, 2023  Three stool samples she has submitted in mid June were negative for occult blood.     She states that she was prescribed an oral formulation of iron in early June which she did not tolerate well, and she was switched to a different formulation that she can tolerate better."   "     -reports ongoing hemorrhoidal bleeding, + Tried oral iron which caused GI side effects, constipation + hemorrhoids.   -reports she her daughter got  2 weeks ago and she's put off getting her colonoscopy because of wedding planning     Oncology History    No history exists.       Past Medical History:   Past Medical History:   Diagnosis Date    Essential (primary) hypertension     since ~     Family history of breast cancer     Mother at age 58, Sister age 35    H/O bone density study 2015    R hip: 0.20   //   L hip:  0.02   //  spine 2.46    ([facility] [place])     History of cholelithiasis     Hypothyroidism     since     Menopause     Pap smear for cervical cancer screening 2015    Pap/Hpv negative  ([facility] [place])        Past Surgical HIstory:   Past Surgical History:   Procedure Laterality Date     SECTION      x 2    CHOLECYSTECTOMY      COLONOSCOPY, SCREENING, LOW RISK PATIENT N/A 2025    Procedure: COLONOSCOPY, SCREENING, LOW RISK PATIENT;  Surgeon: PHILL Quintero MD;  Location: [facility];  Service: Endoscopy;  Laterality: N/A;    LAPAROTOMY, EXPLORATORY N/A 10/23/2023    Procedure: LAPAROTOMY, EXPLORATORY;  Surgeon: Gilberto Whiteside MD;  Location: [facility];  Service: OB/GYN;  Laterality: N/A;    SALPINGOOPHORECTOMY Bilateral 10/23/2023    Procedure: SALPINGO-OOPHORECTOMY;  Surgeon: Gilberto Whiteside MD;  Location: [facility] [place] operated by [facility];  Service: OB/GYN;  Laterality: Bilateral;    TOTAL ABDOMINAL HYSTERECTOMY N/A 10/23/2023    Procedure: HYSTERECTOMY, TOTAL, ABDOMINAL;  Surgeon: Gilberto Whiteside MD;  Location: [facility];  Service: OB/GYN;  Laterality: N/A;  PROCEED AS INDICATED       Family History:   Family History   Problem Relation Name Age of Onset    Breast cancer Mother Inez Torres 58    Cancer Mother Inez Torres         Breast    Breast cancer Sister Toya     Cancer Sister Bryanna  Muniz Garcia         Breast and ? biliary CA    Colon cancer Neg Hx      Ovarian cancer Neg Hx    "      Social History:  reports that she has never smoked. She has never been exposed to tobacco smoke. She has never used smokeless tobacco. She reports current alcohol use. She reports that she does not use drugs.    Allergies:  Review of patient's allergies indicates:   Allergen Reactions    Trazodone Other (See Comments)     Hyperactivity, personality/mood change    Guaifenesin Other (See Comments)     Eyes and nose water       Medications:  Current Medications[1]    Review of Systems   Constitutional:  Negative for appetite change and unexpected weight change.   HENT:  Negative for mouth sores.    Eyes:  Negative for visual disturbance.   Respiratory:  Negative for cough and shortness of breath.    Cardiovascular:  Negative for chest pain.   Gastrointestinal:  Negative for abdominal pain and diarrhea.   Genitourinary:  Negative for frequency.   Musculoskeletal:  Negative for back pain.   Skin:  Negative for rash.   Neurological:  Negative for headaches.   Hematological:  Negative for adenopathy.   Psychiatric/Behavioral:  The patient is not nervous/anxious.        ECOG Performance Status:   ECOG SCORE             Objective:      Vitals:   Vitals:    05/19/25 1101   BP: (!) 153/87   BP Location: Right arm   Patient Position: Sitting   Pulse: 77   Resp: 19   Temp: 98.2 °F (36.8 °C)   TempSrc: Temporal   SpO2: 95%   Weight: 76.8 kg (169 lb 5 oz)   Height: 5' 4" (1.626 m)       BMI: Body mass index is 29.06 kg/m².    Physical Exam  HENT:      Head: Normocephalic.      Nose: Nose normal.      Mouth/Throat:      Mouth: Mucous membranes are moist.      Pharynx: Oropharynx is clear.   Cardiovascular:      Rate and Rhythm: Normal rate and regular rhythm.      Heart sounds: Normal heart sounds.   Pulmonary:      Effort: Pulmonary effort is normal.      Breath sounds: Normal breath sounds.   Musculoskeletal:         General: Normal range of motion.   Skin:     General: Skin is warm and dry.   Neurological:      Mental " Status: She is alert and oriented to person, place, and time.   Psychiatric:         Mood and Affect: Mood normal.         Behavior: Behavior normal.         Laboratory Data:  No visits with results within 1 Day(s) from this visit.   Latest known visit with results is:   Lab Visit on 05/15/2025   Component Date Value Ref Range Status    Sodium 05/15/2025 136  136 - 145 mmol/L Final    Potassium 05/15/2025 3.7  3.5 - 5.1 mmol/L Final    Chloride 05/15/2025 101  95 - 110 mmol/L Final    CO2 05/15/2025 25  23 - 29 mmol/L Final    Glucose 05/15/2025 95  70 - 110 mg/dL Final    BUN 05/15/2025 8  8 - 23 mg/dL Final    Creatinine 05/15/2025 0.7  0.5 - 1.4 mg/dL Final    Calcium 05/15/2025 9.2  8.7 - 10.5 mg/dL Final    Protein Total 05/15/2025 6.8  6.0 - 8.4 gm/dL Final    Albumin 05/15/2025 3.9  3.5 - 5.2 g/dL Final    Bilirubin Total 05/15/2025 0.8  0.1 - 1.0 mg/dL Final    ALP 05/15/2025 63  40 - 150 unit/L Final    AST 05/15/2025 26  11 - 45 unit/L Final    ALT 05/15/2025 20  10 - 44 unit/L Final    Anion Gap 05/15/2025 10  8 - 16 mmol/L Final    eGFR 05/15/2025 >60  >60 mL/min/1.73/m2 Final    Ferritin 05/15/2025 53.0  20.0 - 300.0 ng/mL Final    Iron Level 05/15/2025 48  30 - 160 ug/dL Final    Transferrin 05/15/2025 301  200 - 375 mg/dL Final    Iron Binding Capacity Total 05/15/2025 445  250 - 450 ug/dL Final    Iron Saturation 05/15/2025 11 (L)  20 - 50 % Final    TSH 05/15/2025 3.705  0.400 - 4.000 uIU/mL Final    WBC 05/15/2025 3.40 (L)  3.90 - 12.70 K/uL Final    RBC 05/15/2025 4.40  4.00 - 5.40 M/uL Final    HGB 05/15/2025 13.2  12.0 - 16.0 gm/dL Final    HCT 05/15/2025 37.6  37.0 - 48.5 % Final    MCV 05/15/2025 86  82 - 98 fL Final    MCH 05/15/2025 30.0  27.0 - 31.0 pg Final    MCHC 05/15/2025 35.1  32.0 - 36.0 g/dL Final    RDW 05/15/2025 12.6  11.5 - 14.5 % Final    Platelet Count 05/15/2025 178  150 - 450 K/uL Final    MPV 05/15/2025 10.3  9.2 - 12.9 fL Final    Nucleated RBC 05/15/2025 0  <=0 /100 WBC  Final    Neut % 05/15/2025 45.6  38 - 73 % Final    Lymph % 05/15/2025 36.2  18 - 48 % Final    Mono % 05/15/2025 14.1  4 - 15 % Final    Eos % 05/15/2025 3.2  <=8 % Final    Basophil % 05/15/2025 0.6  <=1.9 % Final    Imm Grans % 05/15/2025 0.3  0.0 - 0.5 % Final    Neut # 05/15/2025 1.55 (L)  1.8 - 7.7 K/uL Final    Lymph # 05/15/2025 1.23  1 - 4.8 K/uL Final    Mono # 05/15/2025 0.48  0.3 - 1 K/uL Final    Eos # 05/15/2025 0.11  <=0.5 K/uL Final    Baso # 05/15/2025 0.02  <=0.2 K/uL Final    Imm Grans # 05/15/2025 0.01  0.00 - 0.04 K/uL Final      Colonoscopy 1/24/25  Impression:            - Diverticulosis in the entire examined colon.                          - One 5 mm polyp at the ileocecal valve, removed                          with a cold snare. Resected and retrieved.                          - One 4 mm polyp in the sigmoid colon, removed                          with a cold snare. Resected and retrieved.                          - The examined portion of the ileum was normal.                          - Non-bleeding internal hemorrhoids. No specimens                          collected.   Pathology:  1. Ileocecal valve, polypectomy:   - Tubular adenoma   - Negative for high-grade dysplasia or malignancy     2. Colon, sigmoid, polypectomy:   - Tubular adenoma   - Negative for high-grade dysplasia or malignancy     Imaging: DXA BONE DENSITY AXIAL SKELETON 1 OR MORE SITES W/TBS (XPD)     CLINICAL HISTORY:  Asymptomatic menopausal state     TECHNIQUE:  DXA scanning was performed over the left hip and lumbar spine.  Review of the images confirms satisfactory positioning and technique.  Trabecular bone score (TBS)  analysis was also performed over the lumbar spine at L1-L4.     COMPARISON:  December 17, 2021     FINDINGS:  DEXA:     The L1-L4 vertebral bone mineral density is equal to 1.409 g/cm squared with a T score of 3.3.  There has been no significant change relative to the prior study.     The left femoral  neck bone mineral density is equal to 0.961 g/cm squared with a T score of 1.0.  There has been a 5.1% statistically significant change relative to the prior study.     There is a 5.9% risk of a major osteoporotic fracture and a 0.2% risk of hip fracture in the next 10 years (TBS adjusted FRAX score).     Trabecular Bone Score:     The lumbar spine trabecular bone score (TBS L1-L4) is 1.468.  This is consistent with normal microarchitecture.     The lumbar spine T-score adjusted for TBS is 4.3.     The femoral neck T-score adjusted for TBS is 1.5.     The bone resilience index is normal compared to the reference population.     Impression:     No evidence of significant bone density loss     Consider FDA approved medical therapies in postmenopausal women and men aged 50 years and older, based on the following:     *A hip or vertebral (clinical or morphometric) fracture  *T score less than or equal to -2.5 at the femoral neck or spine after appropriate evaluation to exclude secondary causes.  *Low bone mass -- also known as osteopenia (T score between -1.0 and -2.5 at the femoral neck or spine) and a 10 year probability of hip fracture greater than or equal to 3% or a 10 year probability of major osteoporosis-related fracture greater than or equal to 20% based on the [place]-adapted WHO algorithm.  *Clinicians judgment and/or patient preference may indicate treatment for people with 10 year fracture probabilities is above or below these levels.        Electronically signed by:Vincent Rivas MD  Date:                                            12/18/2024   Assessment:       1. Iron deficiency anemia, unspecified iron deficiency anemia type             Plan:      Iron-deficiency anemia  -Colonoscopy 1/24/25 revealed non-bleeding hemorrhoids. EGD pending. Following with Dr Quintero.  -her cytopenias formerly resolved with oral iron but she's had ongoing hemorrhoidal bleeding, she reports she will not take oral iron in the  future could because she had abundant GI side effects  -S/P 2 doses of feraheme to avoid worsening constipation issues   -iron levels now WNL. CBC stable.   -Follow up 3 mos with repeat labs    5/19/285: Ferritin stable, anemia resolved currently. Mild leukopenia. Will follow up with repeat labs in 3 mos. Scheduled for follow up ligation with GI next week.       -moving forward can follow exclusively with APPs for ongoing iron deficiency, will need iron as long as she's having hemorrhoidal bleeding issues, once this is resolved, she can be discharged from hematology clinic.            Med Onc Chart Routing      Follow up with physician    Follow up with [facility] 3 months. With repeat CBC, CMP, Ferritin, Iron Studies at least one day prior.   Infusion scheduling note    Injection scheduling note    Labs    Imaging    Pharmacy appointment    Other referrals                Plan was discussed with the patient at length, and she verbalized understanding. Bhavani was given an opportunity to ask questions that were answered to her satisfaction, and she was advised to call in the interval if any problems or questions arise.    Assessment/Plan reviewed and approved by Dr Perez    20  minutes were spent in coordination of patient's care, record review and counseling.    QUINTIN Brink, FNP-C  Hematology & Oncology         [1]   Current Outpatient Medications   Medication Sig Dispense Refill    ALPRAZolam (XANAX) 0.5 MG tablet TAKE 1 TABLET BY MOUTH ONCE DAILY AS NEEDED FOR ANXIETY 15 tablet 0    ascorbic acid, vitamin C, (VITAMIN C) 1000 MG tablet Take 1,000 mg by mouth once daily.      hydrocortisone (ANUSOL-HC) 2.5 % rectal cream Place rectally 2 (two) times daily. 28 g 0    levothyroxine (SYNTHROID) 50 MCG tablet Take 1 tablet by mouth once daily 90 tablet 1    lisinopriL-hydrochlorothiazide (PRINZIDE,ZESTORETIC) 20-12.5 mg per tablet Take 2 tablets by mouth once daily 180 tablet 1    metoprolol succinate (TOPROL-XL) 25 MG 24  hr tablet Take 1 tablet by mouth once daily 90 tablet 1    rosuvastatin (CRESTOR) 10 MG tablet Take 1 tablet by mouth once daily 90 tablet 1    cyanocobalamin (VITAMIN B-12) 1000 MCG tablet Take 3,000 mcg by mouth every other day.      docusate sodium (COLACE) 100 MG capsule Take 100 mg by mouth once daily. (Patient not taking: Reported on 5/19/2025)       No current facility-administered medications for this visit.

## 2025-05-19 ENCOUNTER — OFFICE VISIT (OUTPATIENT)
Dept: HEMATOLOGY/ONCOLOGY | Facility: CLINIC | Age: 70
End: 2025-05-19
Payer: MEDICARE

## 2025-05-19 VITALS
DIASTOLIC BLOOD PRESSURE: 87 MMHG | RESPIRATION RATE: 19 BRPM | BODY MASS INDEX: 28.91 KG/M2 | SYSTOLIC BLOOD PRESSURE: 153 MMHG | HEART RATE: 77 BPM | WEIGHT: 169.31 LBS | OXYGEN SATURATION: 95 % | HEIGHT: 64 IN | TEMPERATURE: 98 F

## 2025-05-19 DIAGNOSIS — D50.9 IRON DEFICIENCY ANEMIA, UNSPECIFIED IRON DEFICIENCY ANEMIA TYPE: Primary | ICD-10-CM

## 2025-05-19 PROCEDURE — 1157F ADVNC CARE PLAN IN RCRD: CPT | Mod: CPTII,S$GLB,,

## 2025-05-19 PROCEDURE — 99999 PR PBB SHADOW E&M-EST. PATIENT-LVL IV: CPT | Mod: PBBFAC,,,

## 2025-05-19 PROCEDURE — 3079F DIAST BP 80-89 MM HG: CPT | Mod: CPTII,S$GLB,,

## 2025-05-19 PROCEDURE — 3008F BODY MASS INDEX DOCD: CPT | Mod: CPTII,S$GLB,,

## 2025-05-19 PROCEDURE — 1101F PT FALLS ASSESS-DOCD LE1/YR: CPT | Mod: CPTII,S$GLB,,

## 2025-05-19 PROCEDURE — 3077F SYST BP >= 140 MM HG: CPT | Mod: CPTII,S$GLB,,

## 2025-05-19 PROCEDURE — 4010F ACE/ARB THERAPY RXD/TAKEN: CPT | Mod: CPTII,S$GLB,,

## 2025-05-19 PROCEDURE — 1159F MED LIST DOCD IN RCRD: CPT | Mod: CPTII,S$GLB,,

## 2025-05-19 PROCEDURE — 1126F AMNT PAIN NOTED NONE PRSNT: CPT | Mod: CPTII,S$GLB,,

## 2025-05-19 PROCEDURE — 3288F FALL RISK ASSESSMENT DOCD: CPT | Mod: CPTII,S$GLB,,

## 2025-05-19 PROCEDURE — 99213 OFFICE O/P EST LOW 20 MIN: CPT | Mod: S$GLB,,,

## 2025-05-19 PROCEDURE — 1160F RVW MEDS BY RX/DR IN RCRD: CPT | Mod: CPTII,S$GLB,,

## 2025-05-30 ENCOUNTER — OFFICE VISIT (OUTPATIENT)
Dept: SURGERY | Facility: CLINIC | Age: 70
End: 2025-05-30
Payer: MEDICARE

## 2025-05-30 VITALS
DIASTOLIC BLOOD PRESSURE: 94 MMHG | BODY MASS INDEX: 28.27 KG/M2 | OXYGEN SATURATION: 99 % | SYSTOLIC BLOOD PRESSURE: 156 MMHG | RESPIRATION RATE: 16 BRPM | TEMPERATURE: 98 F | HEIGHT: 64 IN | HEART RATE: 68 BPM | WEIGHT: 165.56 LBS

## 2025-05-30 DIAGNOSIS — K64.8 BLEEDING INTERNAL HEMORRHOIDS: ICD-10-CM

## 2025-05-30 DIAGNOSIS — K64.2 PROLAPSED INTERNAL HEMORRHOIDS, GRADE 3: Primary | ICD-10-CM

## 2025-05-30 PROCEDURE — 99999 PR PBB SHADOW E&M-EST. PATIENT-LVL III: CPT | Mod: PBBFAC,,, | Performed by: COLON & RECTAL SURGERY

## 2025-05-30 NOTE — PROGRESS NOTES
HPI:  Bhavani Hill is a 69 y.o. female with history of bleeding and prolapsing hemorrhoids     Blood on TP with most BMs.  History of anemia.    Blood in bowl.  Even went to ED 1 year ago.       BMs daily, formed  Using colace  Prolapsing requires manual reduction     Colonoscopy 1-  Findings:       The perianal and digital rectal examinations were normal.        Scattered small-mouthed diverticula were found in the entire colon.        A 5 mm polyp was found in the ileocecal valve. The polyp was        semi-sessile. The polyp was removed with a cold snare. Resection and        retrieval were complete. Verification of patient identification for        the specimen was done. Estimated blood loss was minimal.        A 4 mm polyp was found in the sigmoid colon. The polyp was        semi-pedunculated. The polyp was removed with a cold snare.        Resection and retrieval were complete. Verification of patient        identification for the specimen was done. Estimated blood loss was        minimal.        The terminal ileum appeared normal.        Non-bleeding internal hemorrhoids were found during retroflexion.        The hemorrhoids were moderate and Grade III (internal hemorrhoids        that prolapse but require manual reduction). No biopsies or other        specimens were collected for this exam.      Collected: 01/24/25 1018   Result status: Final   Resulting lab: [facility]   Value: 1. Ileocecal valve, polypectomy:  - Tubular adenoma  - Negative for high-grade dysplasia or malignancy    2. Colon, sigmoid, polypectomy:  - Tubular adenoma  - Negative for high-grade dysplasia or malignancy   Comment: Interp By Jonatan Gustafson MD, PhD, Signed on 01/27/2025 at 13:14         4-  Interval hx  Improved for about a month but has recurrent blood on the toilet paper and reports prolapse.  One sometimes 2 bowel movements in the morning.  Denies straining.       5-  interval history  Still prolapsing,  intermittent bleeding.             Past Medical History:   Diagnosis Date    Essential (primary) hypertension       since ~     Family history of breast cancer       Mother at age 58, Sister age 35    H/O bone density study 2015     R hip: 0.20   //   L hip:  0.02   //  spine 2.46    [place] [place]     History of cholelithiasis      Hypothyroidism       since     Menopause      Pap smear for cervical cancer screening 2015     Pap/Hpv negative  [place] [place]                    Past Surgical History:   Procedure Laterality Date     SECTION         x 2    CHOLECYSTECTOMY   2010    COLONOSCOPY, SCREENING, LOW RISK PATIENT N/A 2025     Procedure: COLONOSCOPY, SCREENING, LOW RISK PATIENT;  Surgeon: PHILL Quintero MD;  [facility]: [facility];  Service: Endoscopy;  Laterality: N/A;    LAPAROTOMY, EXPLORATORY N/A 10/23/2023     Procedure: LAPAROTOMY, EXPLORATORY;  Surgeon: Gilberto Whiteside MD;  Location: [facility];  Service: OB/GYN;  Laterality: N/A;    SALPINGOOPHORECTOMY Bilateral 10/23/2023     Procedure: SALPINGO-OOPHORECTOMY;  Surgeon: Gilberto Whiteside MD;  Location: [facility] OR;  Service: OB/GYN;  Laterality: Bilateral;    TOTAL ABDOMINAL HYSTERECTOMY N/A 10/23/2023     Procedure: HYSTERECTOMY, TOTAL, ABDOMINAL;  Surgeon: Gilberto Whiteside MD;  Location: [facility];  Service: OB/GYN;  Laterality: N/A;  PROCEED AS INDICATED                   Review of patient's allergies indicates:   Allergen Reactions    Trazodone Other (See Comments)       Hyperactivity, personality/mood change    Guaifenesin Other (See Comments)       Eyes and nose water                     Family History   Problem Relation Name Age of Onset    Breast cancer Mother Inez Torres 58    Cancer Mother Inez Torres           Breast    Breast cancer Sister Toya      Cancer Sister Bryanna  Muniz Garcia           Breast and ? biliary CA    Colon cancer Neg Hx        Ovarian cancer Neg Hx             Social History                Socioeconomic History     Marital status:        Spouse name: Eliel   Occupational History    Occupation: teacher   Tobacco Use    Smoking status: Never    Smokeless tobacco: Never   Substance and Sexual Activity    Alcohol use: Yes       Alcohol/week: 0.0 standard drinks of alcohol       Comment: 1 martini daily    Drug use: Never    Sexual activity: Yes       Birth control/protection: Post-menopausal      Social Drivers of Health              Financial Resource Strain: Low Risk  (2/14/2025)     Overall Financial Resource Strain (CARDIA)      Difficulty of Paying Living Expenses: Not hard at all   Food Insecurity: No Food Insecurity (2/14/2025)     Hunger Vital Sign      Worried About Running Out of Food in the Last Year: Never true      Ran Out of Food in the Last Year: Never true   Transportation Needs: No Transportation Needs (2/14/2025)     PRAPARE - Transportation      Lack of Transportation (Medical): No      Lack of Transportation (Non-Medical): No   Physical Activity: Insufficiently Active (2/14/2025)     Exercise Vital Sign      Days of Exercise per Week: 4 days      Minutes of Exercise per Session: 20 min   Stress: No Stress Concern Present (2/14/2025)     [employer] - Occupational Stress Questionnaire      Feeling of Stress : Only a little   Housing Stability: Low Risk  (2/14/2025)     Housing Stability Vital Sign      Unable to Pay for Housing in the Last Year: No      Number of Times Moved in the Last Year: 0      Homeless in the Last Year: No         ROS:  GENERAL: No fever, chills, fatigability or weight loss.  Integument: No rashes, redness, icterus  CHEST: Denies OLMOS, cyanosis, wheezing, cough and sputum production.  CARDIOVASCULAR: Denies chest pain, PND, orthopnea or reduced exercise tolerance.  GI: Denies abd pain, dysphagia, nausea, vomiting, no hematemesis   : Denies burning on urination, no hematuria, no bacteriuria  MSK: No deformities, swelling, joint pain  "swelling  Neurologic: No HAs, seizures, weakness, paresthesias, gait problems     PE:  General appearance iNAD  BP (!) 156/94   Pulse 68   Temp 97.6 °F (36.4 °C) (Temporal)   Resp 16   Ht 5' 4" (1.626 m)   Wt 75.1 kg (165 lb 9.1 oz)   SpO2 99%   BMI 28.42 kg/m²     Sclera/ Skin anicteric  AT NC EOMI  Neck supple trachea midline   Chest symmetric, nl excursion, no retractions, breathing comfortably  EXT - no CCE  Neuro:  Mood/ affect nl, alert and oriented x 3, moves all ext's, gait nl           Assessment:  Bleeding and prolapsing internal hemorrhoids           Plan:  See procedure note  OV 6 weeks        Repeat rubber band ligation     Hemorrhoid Rubber Band Ligation Procedure Note    Verbal consent obtained for anoscopy and rubber band ligation.    The details of the procedure and the expected outcome discussed..  The risk of persistent symptoms, need for repeat banding, bleeding, infection discussed with pt.      Indications: The patient had a history of bleeding and prolapsing  internal hemorrhoids.    Pre-operative Diagnosis: Internal hemorrhoids with bleeding and prolapse     Post-operative Diagnosis: Internal hemorrhoids with bleeding and prolapse    Surgeon: TRES HENRY     Assistants: MA    Findings at anoscopy:    Left lateral internal hemorrhoid grade 2  Right anterior internal hemorrhoid grade 2  Right posterior internal hemorrhoid grade 2    Procedure Details   Acritical timeout was performed.  Digital rectal exam was performed.  I then placed a lubricated anoscope into the anal canal. Suction band ligation was then performed of the internal hemorrhoids in the  right anterior, right posterior positions.             Complications:  None; patient tolerated the procedure well.           Disposition: Discharge from office to home           Condition: stable    Instruction sheet given to patient  High fiber diet  Drink eight glasses of water per day  Miralax as necessary  OV in 6 weeks        "

## 2025-07-21 ENCOUNTER — OFFICE VISIT (OUTPATIENT)
Dept: PRIMARY CARE CLINIC | Facility: CLINIC | Age: 70
End: 2025-07-21
Payer: MEDICARE

## 2025-07-21 VITALS
WEIGHT: 169.06 LBS | HEIGHT: 64 IN | HEART RATE: 72 BPM | TEMPERATURE: 98 F | DIASTOLIC BLOOD PRESSURE: 92 MMHG | RESPIRATION RATE: 16 BRPM | BODY MASS INDEX: 28.86 KG/M2 | SYSTOLIC BLOOD PRESSURE: 142 MMHG

## 2025-07-21 DIAGNOSIS — I10 ESSENTIAL HYPERTENSION: Primary | Chronic | ICD-10-CM

## 2025-07-21 DIAGNOSIS — E78.00 ELEVATED CHOLESTEROL: Chronic | ICD-10-CM

## 2025-07-21 DIAGNOSIS — E03.9 HYPOTHYROIDISM, UNSPECIFIED TYPE: Chronic | ICD-10-CM

## 2025-07-21 DIAGNOSIS — F40.248 OTHER SITUATIONAL TYPE PHOBIA: ICD-10-CM

## 2025-07-21 PROCEDURE — 99999 PR PBB SHADOW E&M-EST. PATIENT-LVL IV: CPT | Mod: PBBFAC,,, | Performed by: FAMILY MEDICINE

## 2025-07-21 RX ORDER — ALPRAZOLAM 0.5 MG/1
0.5 TABLET ORAL DAILY PRN
Qty: 15 TABLET | Refills: 1 | Status: SHIPPED | OUTPATIENT
Start: 2025-07-21

## 2025-07-21 NOTE — PROGRESS NOTES
Primary Care Provider Appointment   [facility], [place]       Patient ID: Bhavani Hill is a 70 y.o. female.    ASSESSMENT/PLAN by Problem List:    Assessment & Plan    IMPRESSION:  - Assessed HTN, noting recent elevated readings in clinics; white coat HTN possible, but requires home BP readings for confirmation.  - Evaluated current antihypertensive regimen: Lisinopril/HCT 20/12.5 mg daily and metoprolol ER 25 mg daily.  - Reviewed hypothyroidism management; recent TSH normal on current levothyroxine dose.  - Assessed HLD treatment; lipids significantly improved on rosuvastatin 10 mg daily.    ## HYPERTENSION:  - Blood pressure readings were elevated during today's visit (142/92 and 138/94).  - Will continue Lisinopril/HCTZ 20/12.5 mg daily and Metoprolol 25 mg extended release tablet daily.  - To rule out white coat hypertension, patient should begin home BP monitoring with an appropriately sized cuff (medium or adult regular).  - Explained proper measurement technique, including importance of relaxation and correct positioning.  - Consider implementing a digital medicine program for BP monitoring managed by pharmacists who can consult with physician for medication adjustments.  - Discussed benefits of physical activity, emphasizing sustained aerobic exercise and low-intensity cardio for heart health and BP control.  - Bhavani should initiate a regular exercise program, starting with low-intensity walking for about 30 minutes per session, focusing on duration rather than distance.  - Follow-up in 2 weeks to review home BP readings with RN    ## HYPOTHYROIDISM:  - Thyroid function is stable with normal TSH from a few months ago.  - Continue Levothyroxine 50 mcg daily.    ## HYPERLIPIDEMIA:  - Lipid levels have significantly improved and are currently satisfactory.  - Continue Rosuvastatin 10 mg daily.    ## ANXIETY:  - Bhavani experiences anxiety during car trips, especially as a passenger,  due to other drivers' behaviors.  - Continue Alprazolam for as-needed use during travel to manage anxiety.    ## FOLLOW-UP:  - Schedule follow up in 3 months to review lab results.  - Bhavani instructed to contact office to schedule labs prior to that appointment.  - If all results are normal at 3-month follow-up, next appointment will be in 6 months.           ASSOCIATED ORDERS:  1. Essential hypertension  Overview:  since ~ 2000    Orders:  -     Comprehensive Metabolic Panel; Future; Expected date: 07/21/2025    2. Other situational type phobia  -     ALPRAZolam (XANAX) 0.5 MG tablet; Take 1 tablet (0.5 mg total) by mouth daily as needed for Anxiety.  Dispense: 15 tablet; Refill: 1    3. Hypothyroidism, unspecified type  Overview:  since 2008    Orders:  -     TSH; Future; Expected date: 07/21/2025    4. Elevated cholesterol  -     Comprehensive Metabolic Panel; Future; Expected date: 07/21/2025  -     Lipid Panel; Future; Expected date: 07/21/2025           Follow Up:  Three months      Subjective:       Follow-up  Pertinent negatives include no arthralgias, chest pain, headaches, joint swelling, neck pain, vomiting or weakness.       Patient is a/an 70 y.o.  female     For complete problem list, past medical history, surgical history, social history, etc., see appropriate section in the electronic medical record    History of Present Illness    CHIEF COMPLAINT:  Bhavani presents today for follow up of hypertension.    HYPERTENSION:  She reports elevated blood pressure readings at various medical facilities, with recent measurements of 142/92 and 138/94. She continues Lisinopril/HCT 20/12.5 mg daily and metoprolol ER 25 mg daily for management.    ANXIETY:  She experiences significant anxiety as a passenger in cars, primarily triggered by observing other drivers using phones or making unsafe maneuvers. Her anxiety intensifies with frequent travel and aggressive driving by companions. She takes Xanax intermittently  during car trips for management, though not daily as prescribed.    MEDICAL HISTORY:  She has hypothyroidism managed with levothyroxine 50 mcg daily. TSH was within normal range a few months ago. She denies symptoms of thyroid dysfunction. She also has hyperlipidemia, well-controlled on rosuvastatin 10 mg daily.    PREVENTIVE CARE:  Mammogram completed on May 3rd of this year.      ROS:  Psychiatric: +anxiety       Review of Systems   Constitutional:  Negative for activity change and unexpected weight change.   HENT:  Negative for hearing loss, rhinorrhea and trouble swallowing.    Eyes:  Negative for discharge and visual disturbance.   Respiratory:  Negative for chest tightness and wheezing.    Cardiovascular:  Negative for chest pain and palpitations.   Gastrointestinal:  Negative for blood in stool, constipation, diarrhea and vomiting.   Endocrine: Negative for polydipsia and polyuria.   Genitourinary:  Negative for difficulty urinating, dysuria, hematuria and menstrual problem.   Musculoskeletal:  Negative for arthralgias, joint swelling and neck pain.   Neurological:  Negative for weakness and headaches.   Psychiatric/Behavioral:  Negative for confusion and dysphoric mood.        Objective     Physical Exam  Vitals reviewed.   Constitutional:       General: She is not in acute distress.     Appearance: She is well-developed. She is not ill-appearing.   HENT:      Head: Normocephalic and atraumatic.   Eyes:      General: No scleral icterus.  Cardiovascular:      Rate and Rhythm: Normal rate and regular rhythm.      Heart sounds: Normal heart sounds. No murmur heard.  Pulmonary:      Effort: Pulmonary effort is normal. No respiratory distress.      Breath sounds: Normal breath sounds. No wheezing or rales.   Musculoskeletal:      Right lower leg: No edema.      Left lower leg: No edema.   Skin:     General: Skin is dry.   Neurological:      Mental Status: She is alert and oriented to person, place, and time.  "  Psychiatric:         Behavior: Behavior normal.       Vitals:    07/21/25 1313 07/21/25 1334   BP: (!) 138/94 (!) 142/92   BP Location: Left arm    Patient Position: Sitting    Pulse: 72    Resp: 16    Temp: 98.3 °F (36.8 °C)    TempSrc: Oral    Weight: 76.7 kg (169 lb 1.5 oz)    Height: 5' 4" (1.626 m)      Physical Exam                    This note was generated with the assistance of ambient listening technology. Verbal consent was obtained by the patient and accompanying visitor(s) for the recording of patient appointment to facilitate this note. I attest to having reviewed and edited the generated note for accuracy, though some syntax or spelling errors may persist. Please contact the author of this note for any clarification.  Parts of the note were also generated with voice recognition software.  Occasionally this software will misinterpreted words or phrases    "

## 2025-07-23 ENCOUNTER — OFFICE VISIT (OUTPATIENT)
Dept: SURGERY | Facility: CLINIC | Age: 70
End: 2025-07-23
Payer: MEDICARE

## 2025-07-23 VITALS
OXYGEN SATURATION: 98 % | BODY MASS INDEX: 28.68 KG/M2 | DIASTOLIC BLOOD PRESSURE: 95 MMHG | TEMPERATURE: 98 F | RESPIRATION RATE: 20 BRPM | HEIGHT: 64 IN | HEART RATE: 65 BPM | SYSTOLIC BLOOD PRESSURE: 153 MMHG | WEIGHT: 168 LBS

## 2025-07-23 DIAGNOSIS — K64.2 PROLAPSED INTERNAL HEMORRHOIDS, GRADE 3: Primary | ICD-10-CM

## 2025-07-23 PROCEDURE — 3008F BODY MASS INDEX DOCD: CPT | Mod: CPTII,S$GLB,,

## 2025-07-23 PROCEDURE — 1159F MED LIST DOCD IN RCRD: CPT | Mod: CPTII,S$GLB,,

## 2025-07-23 PROCEDURE — 99213 OFFICE O/P EST LOW 20 MIN: CPT | Mod: S$GLB,,,

## 2025-07-23 PROCEDURE — 3077F SYST BP >= 140 MM HG: CPT | Mod: CPTII,S$GLB,,

## 2025-07-23 PROCEDURE — 3080F DIAST BP >= 90 MM HG: CPT | Mod: CPTII,S$GLB,,

## 2025-07-23 PROCEDURE — 99999 PR PBB SHADOW E&M-EST. PATIENT-LVL III: CPT | Mod: PBBFAC,,,

## 2025-07-23 PROCEDURE — 1101F PT FALLS ASSESS-DOCD LE1/YR: CPT | Mod: CPTII,S$GLB,,

## 2025-07-23 PROCEDURE — 1157F ADVNC CARE PLAN IN RCRD: CPT | Mod: CPTII,S$GLB,,

## 2025-07-23 PROCEDURE — 4010F ACE/ARB THERAPY RXD/TAKEN: CPT | Mod: CPTII,S$GLB,,

## 2025-07-23 PROCEDURE — 1126F AMNT PAIN NOTED NONE PRSNT: CPT | Mod: CPTII,S$GLB,,

## 2025-07-23 PROCEDURE — 3288F FALL RISK ASSESSMENT DOCD: CPT | Mod: CPTII,S$GLB,,

## 2025-07-23 NOTE — PROGRESS NOTES
"Subjective    Chief complaint:  Internal hemorrhoids    History of present illness:   Bhavani Hill is a 70 y.o. female with history of bleeding and prolapsing hemorrhoids presents today for follow-up after rubber band ligation of right anterior and right posterior internal hemorrhoids on 05-. Reports bleeding and prolapse have resolved.       Review of Systems   Constitutional:  Negative for activity change, appetite change, chills, fatigue, fever and unexpected weight change.   Respiratory:  Negative for cough and shortness of breath.    Cardiovascular:  Negative for chest pain and leg swelling.   Gastrointestinal:  Negative for abdominal distention, abdominal pain, anal bleeding, blood in stool, constipation, diarrhea, nausea, rectal pain and vomiting.   Genitourinary:  Negative for difficulty urinating, flank pain and hematuria.   Musculoskeletal: Negative.  Negative for gait problem.   Skin: Negative.  Negative for rash.   Neurological: Negative.  Negative for weakness.   Hematological:  Negative for adenopathy. Does not bruise/bleed easily.   Psychiatric/Behavioral: Negative.          Review of patient's allergies indicates:   Allergen Reactions    Trazodone Other (See Comments)     Hyperactivity, personality/mood change    Guaifenesin Other (See Comments)     Eyes and nose water      Current Medications[1]     Past Medical History[2]     Past Surgical History[3]     Social History[4]     Family History   Problem Relation Name Age of Onset    Breast cancer Mother Inez Torres 58    Cancer Mother Inez Torres         Breast    Breast cancer Sister Toya     Cancer Sister Bryanna Beltranay Garcia         Breast and ? biliary CA    Colon cancer Neg Hx      Ovarian cancer Neg Hx             Objective    BP (!) 153/95 (BP Location: Left arm, Patient Position: Sitting)   Pulse 65   Temp 98 °F (36.7 °C) (Temporal)   Resp 20   Ht 5' 4" (1.626 m)   Wt 76.2 kg (167 lb 15.9 oz)   SpO2 98%   BMI 28.84 kg/m² "     Physical Exam  Vitals reviewed.   Constitutional:       General: She is not in acute distress.     Appearance: Normal appearance. She is not ill-appearing.   HENT:      Head: Normocephalic.   Eyes:      General: No scleral icterus.     Pupils: Pupils are equal, round, and reactive to light.   Cardiovascular:      Rate and Rhythm: Normal rate.      Pulses: Normal pulses.   Pulmonary:      Effort: No respiratory distress.   Abdominal:      General: There is no distension.      Palpations: Abdomen is soft.      Tenderness: There is no abdominal tenderness.   Musculoskeletal:         General: No swelling or signs of injury.   Skin:     General: Skin is warm and dry.      Coloration: Skin is not jaundiced.   Neurological:      Mental Status: She is alert and oriented to person, place, and time. Mental status is at baseline.      Motor: No weakness.      Gait: Gait normal.   Psychiatric:         Mood and Affect: Mood normal.         Behavior: Behavior normal.       Assessment & Plan  Prolapsed internal hemorrhoids, grade 3  Bleeding and prolapse resolved with RBL.   Continue high fiber diet.            Follow up as needed if symptoms reoccur.     RUDY Guillaume          [1]   Current Outpatient Medications   Medication Sig Dispense Refill    ALPRAZolam (XANAX) 0.5 MG tablet Take 1 tablet (0.5 mg total) by mouth daily as needed for Anxiety. 15 tablet 1    ascorbic acid, vitamin C, (VITAMIN C) 1000 MG tablet Take 1,000 mg by mouth once daily.      cyanocobalamin (VITAMIN B-12) 1000 MCG tablet Take 3,000 mcg by mouth every other day.      hydrocortisone (ANUSOL-HC) 2.5 % rectal cream Place rectally 2 (two) times daily. 28 g 0    levothyroxine (SYNTHROID) 50 MCG tablet Take 1 tablet by mouth once daily 90 tablet 1    lisinopriL-hydrochlorothiazide (PRINZIDE,ZESTORETIC) 20-12.5 mg per tablet Take 2 tablets by mouth once daily 180 tablet 1    metoprolol succinate (TOPROL-XL) 25 MG 24 hr tablet Take 1 tablet by mouth  once daily 90 tablet 1    rosuvastatin (CRESTOR) 10 MG tablet Take 1 tablet by mouth once daily 90 tablet 1     No current facility-administered medications for this visit.   [2]   Past Medical History:  Diagnosis Date    Essential (primary) hypertension     since ~ 2000    Family history of breast cancer     Mother at age 58, Sister age 35    H/O bone density study 2015    R hip: 0.20   //   L hip:  0.02   //  spine 2.46    ([facility])     History of cholelithiasis     Hypothyroidism     since     Menopause     Pap smear for cervical cancer screening 2015    Pap/Hpv negative  ([facility])    [3]   Past Surgical History:  Procedure Laterality Date     SECTION      x 2    CHOLECYSTECTOMY      COLONOSCOPY, SCREENING, LOW RISK PATIENT N/A 2025    Procedure: COLONOSCOPY, SCREENING, LOW RISK PATIENT;  Surgeon: PHILL Quintero MD;  Location: [facility];  Service: Endoscopy;  Laterality: N/A;    LAPAROTOMY, EXPLORATORY N/A 10/23/2023    Procedure: LAPAROTOMY, EXPLORATORY;  Surgeon: Gilberto Whiteside MD;  Location: [facility] OR;  Service: OB/GYN;  Laterality: N/A;    SALPINGOOPHORECTOMY Bilateral 10/23/2023    Procedure: SALPINGO-OOPHORECTOMY;  Surgeon: Gilberto Whiteside MD;  Location: [facility] OR;  Service: OB/GYN;  Laterality: Bilateral;    TOTAL ABDOMINAL HYSTERECTOMY N/A 10/23/2023    Procedure: HYSTERECTOMY, TOTAL, ABDOMINAL;  Surgeon: Gilberto Whiteside MD;  Location: [facility] OR;  Service: OB/GYN;  Laterality: N/A;  PROCEED AS INDICATED   [4]   Social History  Tobacco Use    Smoking status: Never     Passive exposure: Never    Smokeless tobacco: Never   Substance Use Topics    Alcohol use: Yes     Alcohol/week: 0.0 standard drinks of alcohol     Comment: 1 martini daily    Drug use: Never

## 2025-08-05 ENCOUNTER — CLINICAL SUPPORT (OUTPATIENT)
Dept: PRIMARY CARE CLINIC | Facility: CLINIC | Age: 70
End: 2025-08-05
Payer: MEDICARE

## 2025-08-05 VITALS
DIASTOLIC BLOOD PRESSURE: 100 MMHG | OXYGEN SATURATION: 98 % | WEIGHT: 168.31 LBS | SYSTOLIC BLOOD PRESSURE: 176 MMHG | RESPIRATION RATE: 17 BRPM | HEIGHT: 64 IN | HEART RATE: 60 BPM | BODY MASS INDEX: 28.73 KG/M2

## 2025-08-05 DIAGNOSIS — I10 ESSENTIAL HYPERTENSION: Primary | ICD-10-CM

## 2025-08-05 PROCEDURE — 99999 PR PBB SHADOW E&M-EST. PATIENT-LVL IV: CPT | Mod: PBBFAC,,,

## 2025-08-07 DIAGNOSIS — I70.0 AORTIC ATHEROSCLEROSIS: ICD-10-CM

## 2025-08-07 RX ORDER — ROSUVASTATIN CALCIUM 10 MG/1
10 TABLET, COATED ORAL
Qty: 100 TABLET | Refills: 3 | Status: SHIPPED | OUTPATIENT
Start: 2025-08-07

## 2025-08-14 ENCOUNTER — LAB VISIT (OUTPATIENT)
Dept: LAB | Facility: HOSPITAL | Age: 70
End: 2025-08-14
Payer: MEDICARE

## 2025-08-14 DIAGNOSIS — D50.9 IRON DEFICIENCY ANEMIA, UNSPECIFIED IRON DEFICIENCY ANEMIA TYPE: ICD-10-CM

## 2025-08-14 LAB
ABSOLUTE EOSINOPHIL (OHS): 0.12 K/UL
ABSOLUTE MONOCYTE (OHS): 0.43 K/UL (ref 0.3–1)
ABSOLUTE NEUTROPHIL COUNT (OHS): 1.92 K/UL (ref 1.8–7.7)
ALBUMIN SERPL BCP-MCNC: 3.8 G/DL (ref 3.5–5.2)
ALP SERPL-CCNC: 79 UNIT/L (ref 40–150)
ALT SERPL W/O P-5'-P-CCNC: 15 UNIT/L (ref 10–44)
ANION GAP (OHS): 10 MMOL/L (ref 8–16)
AST SERPL-CCNC: 23 UNIT/L (ref 11–45)
BASOPHILS # BLD AUTO: 0.03 K/UL
BASOPHILS NFR BLD AUTO: 0.7 %
BILIRUB SERPL-MCNC: 1.5 MG/DL (ref 0.1–1)
BUN SERPL-MCNC: 10 MG/DL (ref 8–23)
CALCIUM SERPL-MCNC: 9.7 MG/DL (ref 8.7–10.5)
CHLORIDE SERPL-SCNC: 102 MMOL/L (ref 95–110)
CO2 SERPL-SCNC: 27 MMOL/L (ref 23–29)
CREAT SERPL-MCNC: 0.8 MG/DL (ref 0.5–1.4)
ERYTHROCYTE [DISTWIDTH] IN BLOOD BY AUTOMATED COUNT: 12.7 % (ref 11.5–14.5)
FERRITIN SERPL-MCNC: 32 NG/ML (ref 20–300)
GFR SERPLBLD CREATININE-BSD FMLA CKD-EPI: >60 ML/MIN/1.73/M2
GLUCOSE SERPL-MCNC: 104 MG/DL (ref 70–110)
HCT VFR BLD AUTO: 37 % (ref 37–48.5)
HGB BLD-MCNC: 12.9 GM/DL (ref 12–16)
IMM GRANULOCYTES # BLD AUTO: 0.01 K/UL (ref 0–0.04)
IMM GRANULOCYTES NFR BLD AUTO: 0.2 % (ref 0–0.5)
IRON SATN MFR SERPL: 22 % (ref 20–50)
IRON SERPL-MCNC: 91 UG/DL (ref 30–160)
LYMPHOCYTES # BLD AUTO: 1.68 K/UL (ref 1–4.8)
MCH RBC QN AUTO: 29.8 PG (ref 27–31)
MCHC RBC AUTO-ENTMCNC: 34.9 G/DL (ref 32–36)
MCV RBC AUTO: 86 FL (ref 82–98)
NUCLEATED RBC (/100WBC) (OHS): 0 /100 WBC
PLATELET # BLD AUTO: 192 K/UL (ref 150–450)
PMV BLD AUTO: 10.7 FL (ref 9.2–12.9)
POTASSIUM SERPL-SCNC: 4 MMOL/L (ref 3.5–5.1)
PROT SERPL-MCNC: 7 GM/DL (ref 6–8.4)
RBC # BLD AUTO: 4.33 M/UL (ref 4–5.4)
RELATIVE EOSINOPHIL (OHS): 2.9 %
RELATIVE LYMPHOCYTE (OHS): 40.1 % (ref 18–48)
RELATIVE MONOCYTE (OHS): 10.3 % (ref 4–15)
RELATIVE NEUTROPHIL (OHS): 45.8 % (ref 38–73)
SODIUM SERPL-SCNC: 139 MMOL/L (ref 136–145)
TIBC SERPL-MCNC: 416 UG/DL (ref 250–450)
TRANSFERRIN SERPL-MCNC: 281 MG/DL (ref 200–375)
WBC # BLD AUTO: 4.19 K/UL (ref 3.9–12.7)

## 2025-08-14 PROCEDURE — 82040 ASSAY OF SERUM ALBUMIN: CPT | Mod: PN

## 2025-08-14 PROCEDURE — 84466 ASSAY OF TRANSFERRIN: CPT

## 2025-08-14 PROCEDURE — 82728 ASSAY OF FERRITIN: CPT

## 2025-08-14 PROCEDURE — 36415 COLL VENOUS BLD VENIPUNCTURE: CPT | Mod: PN

## 2025-08-14 PROCEDURE — 85025 COMPLETE CBC W/AUTO DIFF WBC: CPT | Mod: PN

## 2025-08-19 ENCOUNTER — OFFICE VISIT (OUTPATIENT)
Dept: HEMATOLOGY/ONCOLOGY | Facility: CLINIC | Age: 70
End: 2025-08-19
Payer: MEDICARE

## 2025-08-19 VITALS
HEIGHT: 64 IN | BODY MASS INDEX: 28.98 KG/M2 | TEMPERATURE: 98 F | SYSTOLIC BLOOD PRESSURE: 152 MMHG | WEIGHT: 169.75 LBS | DIASTOLIC BLOOD PRESSURE: 88 MMHG | RESPIRATION RATE: 19 BRPM | HEART RATE: 71 BPM | OXYGEN SATURATION: 97 %

## 2025-08-19 DIAGNOSIS — D50.9 IRON DEFICIENCY ANEMIA, UNSPECIFIED IRON DEFICIENCY ANEMIA TYPE: Primary | ICD-10-CM

## 2025-08-19 PROCEDURE — 3079F DIAST BP 80-89 MM HG: CPT | Mod: CPTII,S$GLB,,

## 2025-08-19 PROCEDURE — 3008F BODY MASS INDEX DOCD: CPT | Mod: CPTII,S$GLB,,

## 2025-08-19 PROCEDURE — 1126F AMNT PAIN NOTED NONE PRSNT: CPT | Mod: CPTII,S$GLB,,

## 2025-08-19 PROCEDURE — 1160F RVW MEDS BY RX/DR IN RCRD: CPT | Mod: CPTII,S$GLB,,

## 2025-08-19 PROCEDURE — 1157F ADVNC CARE PLAN IN RCRD: CPT | Mod: CPTII,S$GLB,,

## 2025-08-19 PROCEDURE — 1101F PT FALLS ASSESS-DOCD LE1/YR: CPT | Mod: CPTII,S$GLB,,

## 2025-08-19 PROCEDURE — 3077F SYST BP >= 140 MM HG: CPT | Mod: CPTII,S$GLB,,

## 2025-08-19 PROCEDURE — 4010F ACE/ARB THERAPY RXD/TAKEN: CPT | Mod: CPTII,S$GLB,,

## 2025-08-19 PROCEDURE — 3288F FALL RISK ASSESSMENT DOCD: CPT | Mod: CPTII,S$GLB,,

## 2025-08-19 PROCEDURE — 1159F MED LIST DOCD IN RCRD: CPT | Mod: CPTII,S$GLB,,

## 2025-08-19 PROCEDURE — 99214 OFFICE O/P EST MOD 30 MIN: CPT | Mod: S$GLB,,,

## 2025-08-19 PROCEDURE — 99999 PR PBB SHADOW E&M-EST. PATIENT-LVL IV: CPT | Mod: PBBFAC,,,

## (undated) DEVICE — NDL SPINAL 20GX3.5 HUB

## (undated) DEVICE — DRAPE INCISE IOBAN 2 23X17IN

## (undated) DEVICE — KIT WING PAD POSITIONING

## (undated) DEVICE — DRAPE MEDI-SLUSH XL 44X66IN

## (undated) DEVICE — DRAPE STERI INSTRUMENT 1018

## (undated) DEVICE — SOL POVIDONE PREP IODINE 4 OZ

## (undated) DEVICE — SUT SILK 3-0 SH 18IN BLACK

## (undated) DEVICE — TOWEL OR DISP STRL BLUE 4/PK

## (undated) DEVICE — SUT 0 54IN COATED VICRYL U

## (undated) DEVICE — MARKER SKIN STND TIP BLUE BARR

## (undated) DEVICE — SYR 30CC LUER LOCK

## (undated) DEVICE — JELLY SURGILUBE 5GR

## (undated) DEVICE — PAD PREP CUFFED NS 24X48IN

## (undated) DEVICE — SUT MONOCRYL 4-0 PS-1 UND

## (undated) DEVICE — BOWL STERILE LARGE 32OZ

## (undated) DEVICE — SUT VICRYL 2-0 36 CT-1

## (undated) DEVICE — GLOVE SENSICARE PI GRN 6.5

## (undated) DEVICE — SUT VICRYL+ 1 CT1 18IN

## (undated) DEVICE — Device

## (undated) DEVICE — NDL ECLIPSE SAFETY 23G 1.5IN

## (undated) DEVICE — SUT VICRYL CTD 2-0 GI 27 SH

## (undated) DEVICE — GAUZE ADHESIVE LONG STERILE PK

## (undated) DEVICE — SUT VICRYL PLUS ANTIBACT

## (undated) DEVICE — SOL IRRI STRL WATER 1000ML

## (undated) DEVICE — GOWN NONREINF SET-IN SLV XL

## (undated) DEVICE — SEE MEDLINE ITEM 156902

## (undated) DEVICE — TRAY CATH FOL SIL URIMTR 16FR

## (undated) DEVICE — DRAPE LAPSCP CHOLE 122X102X78

## (undated) DEVICE — TRAY DRY SKIN SCRUB PREP

## (undated) DEVICE — TIP YANKAUERS BULB NO VENT

## (undated) DEVICE — SPONGE LAP 18X18 PREWASHED

## (undated) DEVICE — CONTAINER SPEC OR STRL 4.5OZ

## (undated) DEVICE — ELECTRODE REM PLYHSV RETURN 9

## (undated) DEVICE — SUT CTD VICRYL 0 UND BR SUT

## (undated) DEVICE — SPONGE KITTNER 1/4X 5/8 L STRL

## (undated) DEVICE — SOL POVIDONE SCRUB IODINE 4 OZ

## (undated) DEVICE — APPLICATOR CHLORAPREP ORN 26ML

## (undated) DEVICE — ELECTRODE BLADE TEFLON 6

## (undated) DEVICE — SYR ONLY LUER LOCK 20CC

## (undated) DEVICE — LEGGING CLEAR POLY 2/PACK